# Patient Record
Sex: MALE | Race: WHITE | Employment: OTHER | URBAN - NONMETROPOLITAN AREA
[De-identification: names, ages, dates, MRNs, and addresses within clinical notes are randomized per-mention and may not be internally consistent; named-entity substitution may affect disease eponyms.]

---

## 2019-01-08 ENCOUNTER — HOSPITAL ENCOUNTER (OUTPATIENT)
Dept: GENERAL RADIOLOGY | Age: 64
Discharge: HOME | End: 2019-01-08
Payer: MEDICAID

## 2019-01-08 ENCOUNTER — HOSPITAL ENCOUNTER (OUTPATIENT)
Dept: MRI IMAGING | Age: 64
Discharge: HOME | End: 2019-01-08
Payer: MEDICAID

## 2019-01-08 DIAGNOSIS — M54.6 PAIN IN THORACIC SPINE: ICD-10-CM

## 2019-01-08 DIAGNOSIS — M50.321 DEGENERATION OF INTERVERTEBRAL DISC AT C4-C5 LEVEL: ICD-10-CM

## 2019-01-08 DIAGNOSIS — M48.02 CERVICAL SPINAL STENOSIS: ICD-10-CM

## 2019-01-08 DIAGNOSIS — M50.30 DEGENERATION OF CERVICAL DISC WITHOUT MYELOPATHY: ICD-10-CM

## 2019-01-08 DIAGNOSIS — M47.812 ARTHROPATHY OF CERVICAL FACET JOINT: ICD-10-CM

## 2019-01-08 PROCEDURE — 72141 MRI NECK SPINE W/O DYE: CPT

## 2019-01-08 PROCEDURE — 72040 X-RAY EXAM NECK SPINE 2-3 VW: CPT

## 2019-01-08 PROCEDURE — 72072 X-RAY EXAM THORAC SPINE 3VWS: CPT

## 2020-06-16 ENCOUNTER — TRANSCRIBE ORDERS (OUTPATIENT)
Dept: ADMINISTRATIVE | Facility: HOSPITAL | Age: 65
End: 2020-06-16

## 2020-06-16 DIAGNOSIS — Z01.818 PREOP TESTING: Primary | ICD-10-CM

## 2020-06-20 ENCOUNTER — LAB (OUTPATIENT)
Dept: LAB | Facility: HOSPITAL | Age: 65
End: 2020-06-20

## 2020-06-20 PROCEDURE — U0003 INFECTIOUS AGENT DETECTION BY NUCLEIC ACID (DNA OR RNA); SEVERE ACUTE RESPIRATORY SYNDROME CORONAVIRUS 2 (SARS-COV-2) (CORONAVIRUS DISEASE [COVID-19]), AMPLIFIED PROBE TECHNIQUE, MAKING USE OF HIGH THROUGHPUT TECHNOLOGIES AS DESCRIBED BY CMS-2020-01-R: HCPCS | Performed by: PAIN MEDICINE

## 2020-06-21 LAB
COVID LABCORP PRIORITY: NORMAL
SARS-COV-2 RNA RESP QL NAA+PROBE: NOT DETECTED

## 2020-09-28 ENCOUNTER — TRANSCRIBE ORDERS (OUTPATIENT)
Dept: ADMINISTRATIVE | Facility: HOSPITAL | Age: 65
End: 2020-09-28

## 2020-09-28 DIAGNOSIS — Z01.818 PREOP TESTING: Primary | ICD-10-CM

## 2020-10-05 ENCOUNTER — LAB (OUTPATIENT)
Dept: LAB | Facility: HOSPITAL | Age: 65
End: 2020-10-05

## 2020-10-05 ENCOUNTER — HOSPITAL ENCOUNTER (OUTPATIENT)
Dept: GENERAL RADIOLOGY | Age: 65
Discharge: HOME | End: 2020-10-05
Payer: MEDICAID

## 2020-10-05 PROCEDURE — C9803 HOPD COVID-19 SPEC COLLECT: HCPCS | Performed by: PAIN MEDICINE

## 2020-10-05 PROCEDURE — U0003 INFECTIOUS AGENT DETECTION BY NUCLEIC ACID (DNA OR RNA); SEVERE ACUTE RESPIRATORY SYNDROME CORONAVIRUS 2 (SARS-COV-2) (CORONAVIRUS DISEASE [COVID-19]), AMPLIFIED PROBE TECHNIQUE, MAKING USE OF HIGH THROUGHPUT TECHNOLOGIES AS DESCRIBED BY CMS-2020-01-R: HCPCS | Performed by: PAIN MEDICINE

## 2020-10-05 PROCEDURE — 72100 X-RAY EXAM L-S SPINE 2/3 VWS: CPT

## 2020-10-06 LAB
COVID LABCORP PRIORITY: NORMAL
SARS-COV-2 RNA RESP QL NAA+PROBE: NOT DETECTED

## 2021-01-06 ENCOUNTER — TRANSCRIBE ORDERS (OUTPATIENT)
Dept: LAB | Facility: HOSPITAL | Age: 66
End: 2021-01-06

## 2021-01-06 DIAGNOSIS — Z01.818 PREOP TESTING: Primary | ICD-10-CM

## 2021-01-07 ENCOUNTER — LAB (OUTPATIENT)
Dept: LAB | Facility: HOSPITAL | Age: 66
End: 2021-01-07

## 2021-01-07 PROCEDURE — C9803 HOPD COVID-19 SPEC COLLECT: HCPCS | Performed by: PAIN MEDICINE

## 2021-01-07 PROCEDURE — U0004 COV-19 TEST NON-CDC HGH THRU: HCPCS | Performed by: PAIN MEDICINE

## 2021-01-08 LAB — SARS-COV-2 ORF1AB RESP QL NAA+PROBE: NOT DETECTED

## 2021-02-03 ENCOUNTER — TRANSCRIBE ORDERS (OUTPATIENT)
Dept: LAB | Facility: HOSPITAL | Age: 66
End: 2021-02-03

## 2021-02-03 DIAGNOSIS — Z11.52 ENCOUNTER FOR SCREENING FOR COVID-19: Primary | ICD-10-CM

## 2021-02-04 ENCOUNTER — LAB (OUTPATIENT)
Dept: LAB | Facility: HOSPITAL | Age: 66
End: 2021-02-04

## 2021-02-04 LAB — SARS-COV-2 ORF1AB RESP QL NAA+PROBE: NOT DETECTED

## 2021-02-04 PROCEDURE — U0004 COV-19 TEST NON-CDC HGH THRU: HCPCS | Performed by: NURSE PRACTITIONER

## 2021-02-04 PROCEDURE — C9803 HOPD COVID-19 SPEC COLLECT: HCPCS | Performed by: NURSE PRACTITIONER

## 2021-04-09 ENCOUNTER — TRANSCRIBE ORDERS (OUTPATIENT)
Dept: ADMINISTRATIVE | Facility: HOSPITAL | Age: 66
End: 2021-04-09

## 2021-04-09 DIAGNOSIS — Z01.818 PREOP TESTING: Primary | ICD-10-CM

## 2021-04-12 ENCOUNTER — LAB (OUTPATIENT)
Dept: LAB | Facility: HOSPITAL | Age: 66
End: 2021-04-12

## 2021-04-12 LAB — SARS-COV-2 ORF1AB RESP QL NAA+PROBE: NOT DETECTED

## 2021-04-12 PROCEDURE — U0004 COV-19 TEST NON-CDC HGH THRU: HCPCS | Performed by: PAIN MEDICINE

## 2021-04-12 PROCEDURE — C9803 HOPD COVID-19 SPEC COLLECT: HCPCS | Performed by: PAIN MEDICINE

## 2021-04-14 ENCOUNTER — TRANSCRIBE ORDERS (OUTPATIENT)
Dept: ADMINISTRATIVE | Facility: HOSPITAL | Age: 66
End: 2021-04-14

## 2021-04-14 DIAGNOSIS — M47.12 CERVICAL SPONDYLOSIS WITH MYELOPATHY: Primary | ICD-10-CM

## 2021-04-23 ENCOUNTER — HOSPITAL ENCOUNTER (OUTPATIENT)
Dept: MRI IMAGING | Facility: HOSPITAL | Age: 66
Discharge: HOME | End: 2021-04-23
Admitting: PHYSICAL MEDICINE & REHABILITATION

## 2021-04-23 DIAGNOSIS — M47.12 CERVICAL SPONDYLOSIS WITH MYELOPATHY: ICD-10-CM

## 2021-04-23 PROCEDURE — 72141 MRI NECK SPINE W/O DYE: CPT

## 2021-05-12 ENCOUNTER — TRANSCRIBE ORDERS (OUTPATIENT)
Dept: LAB | Facility: HOSPITAL | Age: 66
End: 2021-05-12

## 2021-05-12 DIAGNOSIS — Z01.818 PRE-OP TESTING: Primary | ICD-10-CM

## 2021-05-14 ENCOUNTER — LAB (OUTPATIENT)
Dept: LAB | Facility: HOSPITAL | Age: 66
End: 2021-05-14

## 2021-05-14 LAB — SARS-COV-2 ORF1AB RESP QL NAA+PROBE: NOT DETECTED

## 2021-05-14 PROCEDURE — U0004 COV-19 TEST NON-CDC HGH THRU: HCPCS | Performed by: PAIN MEDICINE

## 2021-05-14 PROCEDURE — C9803 HOPD COVID-19 SPEC COLLECT: HCPCS | Performed by: PAIN MEDICINE

## 2021-07-19 ENCOUNTER — LAB (OUTPATIENT)
Dept: LAB | Facility: HOSPITAL | Age: 66
End: 2021-07-19

## 2021-07-19 ENCOUNTER — TRANSCRIBE ORDERS (OUTPATIENT)
Dept: LAB | Facility: HOSPITAL | Age: 66
End: 2021-07-19

## 2021-07-19 DIAGNOSIS — Z01.818 PREOPERATIVE TESTING: Primary | ICD-10-CM

## 2021-07-19 LAB — SARS-COV-2 ORF1AB RESP QL NAA+PROBE: NOT DETECTED

## 2021-07-19 PROCEDURE — C9803 HOPD COVID-19 SPEC COLLECT: HCPCS | Performed by: PAIN MEDICINE

## 2021-07-19 PROCEDURE — U0004 COV-19 TEST NON-CDC HGH THRU: HCPCS | Performed by: PAIN MEDICINE

## 2021-08-18 ENCOUNTER — TRANSCRIBE ORDERS (OUTPATIENT)
Dept: LAB | Facility: HOSPITAL | Age: 66
End: 2021-08-18

## 2021-08-18 DIAGNOSIS — Z01.818 PREOPERATIVE TESTING: Primary | ICD-10-CM

## 2021-08-20 ENCOUNTER — LAB (OUTPATIENT)
Dept: LAB | Facility: HOSPITAL | Age: 66
End: 2021-08-20

## 2021-08-20 LAB — SARS-COV-2 ORF1AB RESP QL NAA+PROBE: NOT DETECTED

## 2021-08-20 PROCEDURE — U0004 COV-19 TEST NON-CDC HGH THRU: HCPCS | Performed by: PAIN MEDICINE

## 2021-08-20 PROCEDURE — C9803 HOPD COVID-19 SPEC COLLECT: HCPCS | Performed by: PAIN MEDICINE

## 2021-10-22 ENCOUNTER — TRANSCRIBE ORDERS (OUTPATIENT)
Dept: LAB | Facility: HOSPITAL | Age: 66
End: 2021-10-22

## 2021-10-22 DIAGNOSIS — Z01.818 PREOP TESTING: Primary | ICD-10-CM

## 2021-10-23 ENCOUNTER — LAB (OUTPATIENT)
Dept: LAB | Facility: HOSPITAL | Age: 66
End: 2021-10-23

## 2021-10-23 LAB — SARS-COV-2 ORF1AB RESP QL NAA+PROBE: NOT DETECTED

## 2021-10-23 PROCEDURE — U0004 COV-19 TEST NON-CDC HGH THRU: HCPCS | Performed by: PAIN MEDICINE

## 2021-10-23 PROCEDURE — C9803 HOPD COVID-19 SPEC COLLECT: HCPCS | Performed by: PAIN MEDICINE

## 2021-11-09 ENCOUNTER — HOSPITAL ENCOUNTER (OUTPATIENT)
Dept: GENERAL RADIOLOGY | Age: 66
Discharge: HOME | End: 2021-11-09
Payer: MEDICAID

## 2021-11-09 ENCOUNTER — HOSPITAL ENCOUNTER (OUTPATIENT)
Dept: MRI IMAGING | Age: 66
Discharge: HOME | End: 2021-11-09
Payer: MEDICAID

## 2021-11-09 ENCOUNTER — APPOINTMENT (OUTPATIENT)
Dept: CT IMAGING | Age: 66
End: 2021-11-09
Payer: MEDICAID

## 2021-11-09 ENCOUNTER — HOSPITAL ENCOUNTER (EMERGENCY)
Age: 66
Discharge: ANOTHER HOSPITAL | End: 2021-11-09
Attending: EMERGENCY MEDICINE
Payer: MEDICAID

## 2021-11-09 ENCOUNTER — HOSPITAL ENCOUNTER (INPATIENT)
Facility: HOSPITAL | Age: 66
LOS: 2 days | Discharge: HOME | End: 2021-11-11
Attending: SURGERY | Admitting: SURGERY

## 2021-11-09 ENCOUNTER — OFFICE VISIT (OUTPATIENT)
Dept: VASCULAR SURGERY | Facility: CLINIC | Age: 66
End: 2021-11-09

## 2021-11-09 VITALS
HEART RATE: 69 BPM | BODY MASS INDEX: 37.44 KG/M2 | OXYGEN SATURATION: 92 % | WEIGHT: 247 LBS | DIASTOLIC BLOOD PRESSURE: 78 MMHG | HEIGHT: 68 IN | SYSTOLIC BLOOD PRESSURE: 120 MMHG

## 2021-11-09 VITALS
OXYGEN SATURATION: 97 % | WEIGHT: 247 LBS | HEART RATE: 74 BPM | HEIGHT: 78 IN | DIASTOLIC BLOOD PRESSURE: 90 MMHG | RESPIRATION RATE: 20 BRPM | TEMPERATURE: 98.6 F | SYSTOLIC BLOOD PRESSURE: 124 MMHG | BODY MASS INDEX: 28.58 KG/M2

## 2021-11-09 DIAGNOSIS — M54.50 CHRONIC LOW BACK PAIN WITHOUT SCIATICA, UNSPECIFIED BACK PAIN LATERALITY: ICD-10-CM

## 2021-11-09 DIAGNOSIS — Z72.0 TOBACCO USE: ICD-10-CM

## 2021-11-09 DIAGNOSIS — I71.40 ABDOMINAL AORTIC ANEURYSM (AAA) GREATER THAN 5.5 CM IN DIAMETER IN MALE (HCC): Primary | ICD-10-CM

## 2021-11-09 DIAGNOSIS — Z72.0 TOBACCO ABUSE: ICD-10-CM

## 2021-11-09 DIAGNOSIS — E78.5 HYPERLIPIDEMIA, UNSPECIFIED HYPERLIPIDEMIA TYPE: ICD-10-CM

## 2021-11-09 DIAGNOSIS — I10 PRIMARY HYPERTENSION: ICD-10-CM

## 2021-11-09 DIAGNOSIS — I71.40 ABDOMINAL AORTIC ANEURYSM (AAA) WITHOUT RUPTURE: Primary | ICD-10-CM

## 2021-11-09 DIAGNOSIS — M51.36 OTHER INTERVERTEBRAL DISC DEGENERATION, LUMBAR REGION: ICD-10-CM

## 2021-11-09 DIAGNOSIS — I71.40 ABDOMINAL AORTIC ANEURYSM (AAA) WITHOUT RUPTURE (HCC): Primary | ICD-10-CM

## 2021-11-09 DIAGNOSIS — I10 ESSENTIAL HYPERTENSION: ICD-10-CM

## 2021-11-09 DIAGNOSIS — G89.29 CHRONIC LOW BACK PAIN WITHOUT SCIATICA, UNSPECIFIED BACK PAIN LATERALITY: ICD-10-CM

## 2021-11-09 DIAGNOSIS — K50.90 CROHN'S DISEASE WITHOUT COMPLICATION, UNSPECIFIED GASTROINTESTINAL TRACT LOCATION (HCC): ICD-10-CM

## 2021-11-09 LAB
ABO GROUP BLD: NORMAL
ABO/RH: NORMAL
ALBUMIN SERPL-MCNC: 4 G/DL (ref 3.5–5.2)
ALP BLD-CCNC: 82 U/L (ref 40–130)
ALT SERPL-CCNC: 19 U/L (ref 5–41)
ANION GAP SERPL CALCULATED.3IONS-SCNC: 12 MMOL/L (ref 7–19)
ANTIBODY SCREEN: NORMAL
APTT PPP: 27.7 SECONDS (ref 24.1–35)
AST SERPL-CCNC: 14 U/L (ref 5–40)
BASOPHILS ABSOLUTE: 0.1 K/UL (ref 0–0.2)
BASOPHILS RELATIVE PERCENT: 0.9 % (ref 0–1)
BILIRUB SERPL-MCNC: 0.4 MG/DL (ref 0.2–1.2)
BLD GP AB SCN SERPL QL: NEGATIVE
BUN BLDV-MCNC: 16 MG/DL (ref 8–23)
CALCIUM SERPL-MCNC: 9.4 MG/DL (ref 8.8–10.2)
CHLORIDE BLD-SCNC: 106 MMOL/L (ref 98–111)
CO2: 22 MMOL/L (ref 22–29)
CREAT SERPL-MCNC: 0.8 MG/DL (ref 0.5–1.2)
EOSINOPHILS ABSOLUTE: 0.1 K/UL (ref 0–0.6)
EOSINOPHILS RELATIVE PERCENT: 1.4 % (ref 0–5)
GFR AFRICAN AMERICAN: >59
GFR NON-AFRICAN AMERICAN: >60
GLUCOSE BLD-MCNC: 133 MG/DL (ref 74–109)
HCT VFR BLD CALC: 45.5 % (ref 42–52)
HEMOGLOBIN: 14.7 G/DL (ref 14–18)
IMMATURE GRANULOCYTES #: 0.1 K/UL
INR BLD: 1 (ref 0.88–1.18)
LIPASE: 19 U/L (ref 13–60)
LYMPHOCYTES ABSOLUTE: 2.4 K/UL (ref 1.1–4.5)
LYMPHOCYTES RELATIVE PERCENT: 26.7 % (ref 20–40)
MCH RBC QN AUTO: 32 PG (ref 27–31)
MCHC RBC AUTO-ENTMCNC: 32.3 G/DL (ref 33–37)
MCV RBC AUTO: 99.1 FL (ref 80–94)
MONOCYTES ABSOLUTE: 1.1 K/UL (ref 0–0.9)
MONOCYTES RELATIVE PERCENT: 12.4 % (ref 0–10)
NEUTROPHILS ABSOLUTE: 5.3 K/UL (ref 1.5–7.5)
NEUTROPHILS RELATIVE PERCENT: 58 % (ref 50–65)
PDW BLD-RTO: 13.2 % (ref 11.5–14.5)
PLATELET # BLD: 211 K/UL (ref 130–400)
PMV BLD AUTO: 9.5 FL (ref 9.4–12.4)
POTASSIUM SERPL-SCNC: 4.1 MMOL/L (ref 3.5–5)
PROTHROMBIN TIME: 13.4 SEC (ref 12–14.6)
RBC # BLD: 4.59 M/UL (ref 4.7–6.1)
RH BLD: POSITIVE
SARS-COV-2 RNA PNL SPEC NAA+PROBE: NOT DETECTED
SARS-COV-2, NAAT: NOT DETECTED
SODIUM BLD-SCNC: 140 MMOL/L (ref 136–145)
T&S EXPIRATION DATE: NORMAL
TOTAL PROTEIN: 7 G/DL (ref 6.6–8.7)
WBC # BLD: 9.1 K/UL (ref 4.8–10.8)

## 2021-11-09 PROCEDURE — 86850 RBC ANTIBODY SCREEN: CPT

## 2021-11-09 PROCEDURE — 83690 ASSAY OF LIPASE: CPT

## 2021-11-09 PROCEDURE — 96374 THER/PROPH/DIAG INJ IV PUSH: CPT

## 2021-11-09 PROCEDURE — 87635 SARS-COV-2 COVID-19 AMP PRB: CPT | Performed by: SURGERY

## 2021-11-09 PROCEDURE — 87635 SARS-COV-2 COVID-19 AMP PRB: CPT

## 2021-11-09 PROCEDURE — 85730 THROMBOPLASTIN TIME PARTIAL: CPT | Performed by: SURGERY

## 2021-11-09 PROCEDURE — 85025 COMPLETE CBC W/AUTO DIFF WBC: CPT

## 2021-11-09 PROCEDURE — 6360000002 HC RX W HCPCS: Performed by: EMERGENCY MEDICINE

## 2021-11-09 PROCEDURE — 86901 BLOOD TYPING SEROLOGIC RH(D): CPT | Performed by: NURSE PRACTITIONER

## 2021-11-09 PROCEDURE — 74174 CTA ABD&PLVS W/CONTRAST: CPT

## 2021-11-09 PROCEDURE — 99284 EMERGENCY DEPT VISIT MOD MDM: CPT

## 2021-11-09 PROCEDURE — 86850 RBC ANTIBODY SCREEN: CPT | Performed by: NURSE PRACTITIONER

## 2021-11-09 PROCEDURE — 72100 X-RAY EXAM L-S SPINE 2/3 VWS: CPT

## 2021-11-09 PROCEDURE — 99204 OFFICE O/P NEW MOD 45 MIN: CPT | Performed by: SURGERY

## 2021-11-09 PROCEDURE — 85610 PROTHROMBIN TIME: CPT

## 2021-11-09 PROCEDURE — 93005 ELECTROCARDIOGRAM TRACING: CPT | Performed by: EMERGENCY MEDICINE

## 2021-11-09 PROCEDURE — 2500000003 HC RX 250 WO HCPCS: Performed by: EMERGENCY MEDICINE

## 2021-11-09 PROCEDURE — 6360000004 HC RX CONTRAST MEDICATION: Performed by: EMERGENCY MEDICINE

## 2021-11-09 PROCEDURE — 96375 TX/PRO/DX INJ NEW DRUG ADDON: CPT

## 2021-11-09 PROCEDURE — 86901 BLOOD TYPING SEROLOGIC RH(D): CPT

## 2021-11-09 PROCEDURE — 99285 EMERGENCY DEPT VISIT HI MDM: CPT

## 2021-11-09 PROCEDURE — 80053 COMPREHEN METABOLIC PANEL: CPT

## 2021-11-09 PROCEDURE — 72148 MRI LUMBAR SPINE W/O DYE: CPT

## 2021-11-09 PROCEDURE — 86900 BLOOD TYPING SEROLOGIC ABO: CPT | Performed by: NURSE PRACTITIONER

## 2021-11-09 PROCEDURE — 71275 CT ANGIOGRAPHY CHEST: CPT

## 2021-11-09 PROCEDURE — 86900 BLOOD TYPING SEROLOGIC ABO: CPT

## 2021-11-09 PROCEDURE — 36415 COLL VENOUS BLD VENIPUNCTURE: CPT

## 2021-11-09 RX ORDER — ATORVASTATIN CALCIUM 20 MG/1
20 TABLET, FILM COATED ORAL NIGHTLY
COMMUNITY

## 2021-11-09 RX ORDER — ONDANSETRON 2 MG/ML
4 INJECTION INTRAMUSCULAR; INTRAVENOUS ONCE
Status: COMPLETED | OUTPATIENT
Start: 2021-11-09 | End: 2021-11-09

## 2021-11-09 RX ORDER — ATORVASTATIN CALCIUM 20 MG/1
TABLET, FILM COATED ORAL
COMMUNITY

## 2021-11-09 RX ORDER — TRAZODONE HYDROCHLORIDE 50 MG/1
50 TABLET ORAL NIGHTLY
Status: DISCONTINUED | OUTPATIENT
Start: 2021-11-09 | End: 2021-11-10 | Stop reason: HOSPADM

## 2021-11-09 RX ORDER — MESALAMINE 0.38 G/1
0.38 CAPSULE, EXTENDED RELEASE ORAL EVERY MORNING
Status: DISCONTINUED | OUTPATIENT
Start: 2021-11-10 | End: 2021-11-10 | Stop reason: HOSPADM

## 2021-11-09 RX ORDER — LABETALOL HYDROCHLORIDE 5 MG/ML
10 INJECTION, SOLUTION INTRAVENOUS ONCE
Status: COMPLETED | OUTPATIENT
Start: 2021-11-09 | End: 2021-11-09

## 2021-11-09 RX ORDER — ACETAMINOPHEN 325 MG/1
650 TABLET ORAL EVERY 4 HOURS PRN
Status: DISCONTINUED | OUTPATIENT
Start: 2021-11-09 | End: 2021-11-10 | Stop reason: HOSPADM

## 2021-11-09 RX ORDER — ONDANSETRON 4 MG/1
4 TABLET, FILM COATED ORAL EVERY 6 HOURS PRN
Status: DISCONTINUED | OUTPATIENT
Start: 2021-11-09 | End: 2021-11-10 | Stop reason: HOSPADM

## 2021-11-09 RX ORDER — LISINOPRIL 20 MG/1
40 TABLET ORAL NIGHTLY
Status: DISCONTINUED | OUTPATIENT
Start: 2021-11-09 | End: 2021-11-10 | Stop reason: HOSPADM

## 2021-11-09 RX ORDER — ALLOPURINOL 300 MG/1
300 TABLET ORAL NIGHTLY
COMMUNITY

## 2021-11-09 RX ORDER — FAMOTIDINE 40 MG/1
80 TABLET, FILM COATED ORAL NIGHTLY
COMMUNITY
End: 2021-12-09

## 2021-11-09 RX ORDER — METOPROLOL SUCCINATE 100 MG/1
1 TABLET, EXTENDED RELEASE ORAL DAILY
COMMUNITY
Start: 2021-11-02

## 2021-11-09 RX ORDER — METOPROLOL SUCCINATE 100 MG/1
100 TABLET, EXTENDED RELEASE ORAL NIGHTLY
Status: DISCONTINUED | OUTPATIENT
Start: 2021-11-09 | End: 2021-11-10 | Stop reason: HOSPADM

## 2021-11-09 RX ORDER — ALLOPURINOL 300 MG/1
TABLET ORAL
COMMUNITY

## 2021-11-09 RX ORDER — FAMOTIDINE 20 MG/1
80 TABLET, FILM COATED ORAL NIGHTLY
Status: DISCONTINUED | OUTPATIENT
Start: 2021-11-09 | End: 2021-11-10 | Stop reason: HOSPADM

## 2021-11-09 RX ORDER — ALLOPURINOL 300 MG/1
300 TABLET ORAL NIGHTLY
Status: DISCONTINUED | OUTPATIENT
Start: 2021-11-09 | End: 2021-11-10 | Stop reason: HOSPADM

## 2021-11-09 RX ORDER — ATORVASTATIN CALCIUM 10 MG/1
20 TABLET, FILM COATED ORAL NIGHTLY
Status: DISCONTINUED | OUTPATIENT
Start: 2021-11-09 | End: 2021-11-10 | Stop reason: HOSPADM

## 2021-11-09 RX ORDER — LISINOPRIL 40 MG/1
TABLET ORAL
COMMUNITY

## 2021-11-09 RX ORDER — PANTOPRAZOLE SODIUM 40 MG/1
40 TABLET, DELAYED RELEASE ORAL EVERY MORNING
Status: DISCONTINUED | OUTPATIENT
Start: 2021-11-10 | End: 2021-11-10 | Stop reason: HOSPADM

## 2021-11-09 RX ORDER — MESALAMINE 0.38 G/1
1.5 CAPSULE, EXTENDED RELEASE ORAL DAILY
COMMUNITY

## 2021-11-09 RX ORDER — ONDANSETRON 2 MG/ML
4 INJECTION INTRAMUSCULAR; INTRAVENOUS EVERY 6 HOURS PRN
Status: DISCONTINUED | OUTPATIENT
Start: 2021-11-09 | End: 2021-11-10 | Stop reason: HOSPADM

## 2021-11-09 RX ORDER — OMEPRAZOLE 40 MG/1
CAPSULE, DELAYED RELEASE ORAL
COMMUNITY

## 2021-11-09 RX ORDER — HYDROCODONE BITARTRATE AND ACETAMINOPHEN 7.5; 325 MG/1; MG/1
1 TABLET ORAL 3 TIMES DAILY PRN
COMMUNITY
Start: 2021-10-15 | End: 2021-12-09

## 2021-11-09 RX ORDER — TRAZODONE HYDROCHLORIDE 50 MG/1
TABLET ORAL
COMMUNITY

## 2021-11-09 RX ORDER — FENTANYL CITRATE 50 UG/ML
50 INJECTION, SOLUTION INTRAMUSCULAR; INTRAVENOUS ONCE
Status: COMPLETED | OUTPATIENT
Start: 2021-11-09 | End: 2021-11-09

## 2021-11-09 RX ORDER — MESALAMINE 0.38 G/1
1.5 CAPSULE, EXTENDED RELEASE ORAL DAILY
Status: ON HOLD | COMMUNITY
End: 2021-11-09

## 2021-11-09 RX ORDER — TRAZODONE HYDROCHLORIDE 50 MG/1
50 TABLET ORAL NIGHTLY
COMMUNITY

## 2021-11-09 RX ORDER — MESALAMINE 0.38 G/1
1.5 CAPSULE, EXTENDED RELEASE ORAL EVERY MORNING
COMMUNITY
End: 2023-02-02

## 2021-11-09 RX ORDER — BUDESONIDE AND FORMOTEROL FUMARATE DIHYDRATE 160; 4.5 UG/1; UG/1
2 AEROSOL RESPIRATORY (INHALATION)
COMMUNITY
Start: 2021-11-02

## 2021-11-09 RX ORDER — METOPROLOL SUCCINATE 100 MG/1
TABLET, EXTENDED RELEASE ORAL
COMMUNITY
Start: 2021-11-02

## 2021-11-09 RX ORDER — OMEPRAZOLE 40 MG/1
40 CAPSULE, DELAYED RELEASE ORAL EVERY MORNING
COMMUNITY

## 2021-11-09 RX ORDER — NICOTINE 21 MG/24HR
1 PATCH, TRANSDERMAL 24 HOURS TRANSDERMAL
Status: DISCONTINUED | OUTPATIENT
Start: 2021-11-09 | End: 2021-11-10 | Stop reason: HOSPADM

## 2021-11-09 RX ORDER — HYDROCODONE BITARTRATE AND ACETAMINOPHEN 7.5; 325 MG/1; MG/1
TABLET ORAL
COMMUNITY
Start: 2021-10-15

## 2021-11-09 RX ORDER — LISINOPRIL 40 MG/1
40 TABLET ORAL DAILY
COMMUNITY

## 2021-11-09 RX ORDER — PANTOPRAZOLE SODIUM 40 MG/1
40 TABLET, DELAYED RELEASE ORAL DAILY
Status: ON HOLD | COMMUNITY
Start: 2021-08-31 | End: 2021-11-09

## 2021-11-09 RX ORDER — SODIUM CHLORIDE 0.9 % (FLUSH) 0.9 %
10 SYRINGE (ML) INJECTION AS NEEDED
Status: DISCONTINUED | OUTPATIENT
Start: 2021-11-09 | End: 2021-11-10 | Stop reason: HOSPADM

## 2021-11-09 RX ORDER — SODIUM CHLORIDE 0.9 % (FLUSH) 0.9 %
10 SYRINGE (ML) INJECTION EVERY 12 HOURS SCHEDULED
Status: DISCONTINUED | OUTPATIENT
Start: 2021-11-09 | End: 2021-11-10 | Stop reason: HOSPADM

## 2021-11-09 RX ORDER — HYDROCODONE BITARTRATE AND ACETAMINOPHEN 7.5; 325 MG/1; MG/1
1 TABLET ORAL EVERY 6 HOURS PRN
Status: DISCONTINUED | OUTPATIENT
Start: 2021-11-09 | End: 2021-11-10 | Stop reason: HOSPADM

## 2021-11-09 RX ORDER — SODIUM CHLORIDE 9 MG/ML
50 INJECTION, SOLUTION INTRAVENOUS CONTINUOUS
Status: DISCONTINUED | OUTPATIENT
Start: 2021-11-09 | End: 2021-11-10 | Stop reason: HOSPADM

## 2021-11-09 RX ADMIN — LISINOPRIL 40 MG: 20 TABLET ORAL at 20:35

## 2021-11-09 RX ADMIN — Medication 10 MG: at 10:00

## 2021-11-09 RX ADMIN — FENTANYL CITRATE 50 MCG: 0.05 INJECTION, SOLUTION INTRAMUSCULAR; INTRAVENOUS at 11:03

## 2021-11-09 RX ADMIN — ATORVASTATIN CALCIUM 20 MG: 10 TABLET, FILM COATED ORAL at 20:35

## 2021-11-09 RX ADMIN — SODIUM CHLORIDE 50 ML/HR: 9 INJECTION, SOLUTION INTRAVENOUS at 15:00

## 2021-11-09 RX ADMIN — FAMOTIDINE 80 MG: 20 TABLET, FILM COATED ORAL at 20:35

## 2021-11-09 RX ADMIN — ALLOPURINOL 300 MG: 300 TABLET ORAL at 20:35

## 2021-11-09 RX ADMIN — ONDANSETRON 4 MG: 2 INJECTION INTRAMUSCULAR; INTRAVENOUS at 11:03

## 2021-11-09 RX ADMIN — SODIUM CHLORIDE, PRESERVATIVE FREE 10 ML: 5 INJECTION INTRAVENOUS at 20:22

## 2021-11-09 RX ADMIN — SODIUM CHLORIDE, PRESERVATIVE FREE 10 ML: 5 INJECTION INTRAVENOUS at 15:00

## 2021-11-09 RX ADMIN — HYDROCODONE BITARTRATE AND ACETAMINOPHEN 1 TABLET: 7.5; 325 TABLET ORAL at 18:38

## 2021-11-09 RX ADMIN — METOPROLOL SUCCINATE 100 MG: 100 TABLET, EXTENDED RELEASE ORAL at 20:35

## 2021-11-09 RX ADMIN — NICOTINE 1 PATCH: 21 PATCH, EXTENDED RELEASE TRANSDERMAL at 15:00

## 2021-11-09 RX ADMIN — IOPAMIDOL 90 ML: 755 INJECTION, SOLUTION INTRAVENOUS at 10:03

## 2021-11-09 RX ADMIN — TRAZODONE HYDROCHLORIDE 50 MG: 50 TABLET ORAL at 20:35

## 2021-11-09 ASSESSMENT — ENCOUNTER SYMPTOMS
BACK PAIN: 1
SHORTNESS OF BREATH: 0
BLOOD IN STOOL: 1
COUGH: 0
ABDOMINAL PAIN: 1

## 2021-11-09 ASSESSMENT — PAIN SCALES - GENERAL: PAINLEVEL_OUTOF10: 5

## 2021-11-09 NOTE — ED NOTES
Naya called back @ [phone]     Vascular Surg.  Will call back      Jason Shay  11/09/21 Zenon Quintero  11/09/21 5562

## 2021-11-09 NOTE — ED PROVIDER NOTES
[facility]  eMERGENCY dEPARTMENT eNCOUnter      Pt Name: Antonio Martinez  MRN: [id number]  [place] 1955  Date of evaluation: 11/9/2021  Provider: Jessica Bella MD    CHIEF COMPLAINT       Chief Complaint   Patient presents with    Back Pain     Sent here from MRI, was getting MRI of back. Possible AAA on MRI, sent here for further evaluation          HISTORY OF PRESENT ILLNESS   (Location/Symptom, Timing/Onset,Context/Setting, Quality, Duration, Modifying Factors, Severity)  Note limiting factors. Antonio Martinez is a 61 y.o. male who presents to the emergency department with back pain. The patient has chronic back pain worsening over the last weeks to months. He also has Crohn's disease he states he has diarrhea at times. And bloody stools now. The patient was sent for an outpatient MRI by Dr. Charleen Lima of pain management as he is had chronic back issues. He was sent over from the MRI machine to the ER after they found a large AAA. The patient arrived he was awake alert he stated that he really could not tell me if his pain was any worse or better today. He has a history of hypertension he does smoke cigarettes. Patient has no fevers or cough. He said no syncope. He has no weakness in his lower extremities. The patient only has pain in his belly when I push on it. Otherwise he states he has been at his usual baseline. The history is provided by the patient, medical records and a relative. NursingNotes were reviewed. REVIEW OF SYSTEMS    (2-9 systems for level 4, 10 or more for level 5)     Review of Systems   Constitutional: Negative for fever. Respiratory: Negative for cough and shortness of breath. Cardiovascular: Negative for chest pain. Gastrointestinal: Positive for abdominal pain and blood in stool. Musculoskeletal: Positive for back pain. Skin: Negative for wound. Neurological: Negative for seizures and syncope.    Psychiatric/Behavioral: Negative for confusion. A complete review of systems was performed and is negative except as noted above in the HPI. PAST MEDICAL HISTORY     Past Medical History:   Diagnosis Date    Arthritis     Chest pain     COPD (chronic obstructive pulmonary disease) ([address]     Dizziness     Gout     Hyperlipidemia     Hypertension          SURGICAL HISTORY       Past Surgical History:   Procedure Laterality Date    APPENDECTOMY      DOPPLER ECHOCARDIOGRAPHY  12/18/10, [facility]    2-D Echo   [facility]    TESTICLE REMOVAL      WRIST SURGERY           CURRENT MEDICATIONS       Discharge Medication List as of 11/9/2021 11:30 AM      CONTINUE these medications which have NOT CHANGED    Details   mesalamine (APRISO) 0.375 g extended release capsule Take 1.5 g by mouth dailyHistorical Med      omeprazole (PRILOSEC) 40 MG delayed release capsule omeprazole 40 mg cpdrHistorical Med      allopurinol (ZYLOPRIM) 300 MG tablet allopurinol 300 mg tabsHistorical Med      atorvastatin (LIPITOR) 20 MG tablet atorvastatin calcium 20 mg tabsHistorical Med      lisinopril (PRINIVIL;ZESTRIL) 40 MG tablet lisinopril 40 mg tabsHistorical Med      metoprolol succinate (TOPROL XL) 100 MG extended release tablet TAKE 1 TABLET BY MOUTH ONCE DAILYHistorical Med      traZODone (DESYREL) 50 MG tablet trazodone 50 mg tablet   TAKE (1) TABLET BY MOUTH EVERY DAY AT BEDTIMEHistorical Med      ipratropium (ATROVENT HFA) 17 MCG/ACT inhaler Inhale 2 puffs into the lungs every 6 hours. aspirin 325 MG EC tablet Take 325 mg by mouth 2 times daily. Historical Med      HYDROcodone-acetaminophen (NORCO) 7.5-325 MG per tablet TAKE 1 TABLET BY MOUTH THREE TIMES DAILY AS NEEDED FOR PAINHistorical Med             ALLERGIES     Midrin [apap-isometheptene-dichloral] and Phenylephrine    FAMILY HISTORY       Family History   Problem Relation Age of Onset    Heart Disease Father     High Blood Pressure Father     High Cholesterol Father    Maura Hunter High Blood Pressure Sister     High Cholesterol Sister           SOCIAL HISTORY       Social History     Socioeconomic History    Marital status:      Spouse name: None    Number of children: None    Years of education: None    Highest education level: None   Occupational History    None   Tobacco Use    Smoking status: Current Every Day Smoker     Packs/day: 1.00    Smokeless tobacco: Never Used   Substance and Sexual Activity    Alcohol use: No    Drug use: None    Sexual activity: None   Other Topics Concern    None   Social History Narrative    None     Social Determinants of Health     Financial Resource Strain:     Difficulty of Paying Living Expenses: Not on file   Food Insecurity:     Worried About Running Out of Food in the Last Year: Not on file    [place] of Food in the Last Year: Not on file   Transportation Needs:     Lack of Transportation (Medical): Not on file    Lack of Transportation (Non-Medical):  Not on file   Physical Activity:     Days of Exercise per Week: Not on file    Minutes of Exercise per Session: Not on file   Stress:     Feeling of Stress : Not on file   Social Connections:     Frequency of Communication with Friends and Family: Not on file    Frequency of Social Gatherings with Friends and Family: Not on file    Attends Quaker Services: Not on file    Active Member of [address] [place] [address] or Organizations: Not on file    Attends Club or Organization Meetings: Not on file    Marital Status: Not on file   Intimate Partner Violence:     Fear of Current or Ex-Partner: Not on file    Emotionally Abused: Not on file    Physically Abused: Not on file    Sexually Abused: Not on file   Housing Stability:     Unable to Pay for Housing in the Last Year: Not on file    Number of [place] in the Last Year: Not on file    Unstable Housing in the Last Year: Not on file       SCREENINGS    [place] Coma Scale  Eye Opening: Spontaneous  Best Verbal Response: Oriented  Best Motor Response: Obeys commands  [place] Coma Scale Score: 15        PHYSICAL EXAM    (up to 7 for level 4, 8 or more for level 5)     ED Triage Vitals [11/09/21 0945]   BP Temp Temp Source Pulse Resp SpO2 Height Weight   (!) 157/101 98.6 °F (37 °C) Oral 75 25 96 % 6' 8\" (2.032 m) 247 lb (112 kg)       Physical Exam  Vitals and nursing note reviewed. Constitutional:       General: He is not in acute distress. Appearance: Normal appearance. He is not ill-appearing, toxic-appearing or diaphoretic. HENT:      Head: Normocephalic and atraumatic. Eyes:      Extraocular Movements: Extraocular movements intact. Pupils: Pupils are equal, round, and reactive to light. Cardiovascular:      Rate and Rhythm: Normal rate and regular rhythm. Pulmonary:      Effort: Pulmonary effort is normal.      Breath sounds: Normal breath sounds. Abdominal:      General: Abdomen is flat. Palpations: Abdomen is soft. Comments: He does have pain on palpation when I palpate otherwise there is no pain he has no complaints of abdominal pain. Musculoskeletal:         General: No tenderness. Normal range of motion. Cervical back: Normal range of motion and neck supple. Comments: Strong pedal pulses normal intact sensation movement of the feet ankles. Skin:     General: Skin is warm and dry. Capillary Refill: Capillary refill takes less than 2 seconds. Neurological:      General: No focal deficit present. Mental Status: He is alert and oriented to person, place, and time. GCS: GCS eye subscore is 4. GCS verbal subscore is 5. GCS motor subscore is 6. Sensory: No sensory deficit. Motor: No weakness.       Gait: Gait normal.   Psychiatric:         Mood and Affect: Mood normal.         Behavior: Behavior normal.         DIAGNOSTIC RESULTS     EKG: All EKG's are interpreted by the Emergency Department Physician who either signs or Co-signs this chart in the absence of a cardiologist.    EKG sinus 74 no obvious acute ischemic changes. QTc 460 ms. RADIOLOGY:   Non-plain film images such as CT, Ultrasound and MRI are read by the radiologist. Plainradiographic images are visualized and preliminarily interpreted by the emergency physician with the below findings:        Interpretation per the Radiologist below, if available at the time of this note:    CTA CHEST [address]   Final Result   1. No evidence of thoracic aorta aneurysm or dissection. 2.  Irregular nodule in the left lower lobe, may reflect scarring. Follow-up CT chest may BE obtained in 3 months to assess stability. .   Signed by Dr Emma Acevedo   Final Result   Impression:   1. Large infrarenal abdominal aortic aneurysm extending to just above   the level the bifurcation. Aneurysm is partially thrombosed. No   evidence of dissection or active hemorrhage. 2. Atherosclerotic disease. 3. Hepatic steatosis. 4. Large hiatal hernia. Signed by Dr Jhon Harris            ED BEDSIDE ULTRASOUND:   Performed by ED Physician - none    LABS:  Labs Reviewed   COMPREHENSIVE METABOLIC PANEL - Abnormal; Notable for the following components:       Result Value    Glucose 133 (*)     All other components within normal limits   CBC WITH AUTO DIFFERENTIAL - Abnormal; Notable for the following components:    RBC 4.59 (*)     MCV 99.1 (*)     MCH 32.0 (*)     MCHC 32.3 (*)     Monocytes % 12.4 (*)     Monocytes Absolute 1.10 (*)     All other components within normal limits   COVID-19, RAPID   LIPASE   PROTIME-INR   TYPE AND SCREEN       All other labs were within normal range or not returned as of this dictation.     EMERGENCY DEPARTMENT COURSE and DIFFERENTIALDIAGNOSIS/MDM:   Vitals:    Vitals:    11/09/21 0945 11/09/21 1034 11/09/21 1045   BP: (!) 157/101 128/87 (!) 124/90   Pulse: 75  74   Resp: 25  20   Temp: 98.6 °F (37 °C)  98.6 °F (37 °C)   TempSrc: Oral     SpO2: 96%  97% Weight: 247 lb (112 kg)     Height: 6' 8\" (2.032 m)         MDM  Number of Diagnoses or Management Options  Abdominal aortic aneurysm (AAA) without rupture [facility]): new and requires workup  Chronic low back pain without sciatica, unspecified back pain laterality  Crohn's disease without complication, unspecified gastrointestinal tract location [facility])  Essential hypertension  Tobacco abuse  Diagnosis management comments: Patient arrives with AAA seen incidentally on a lumbar MRI. I immediately sent him for CTA chest abdomen pelvis. He was found to have a nondissecting nonruptured AAA that is rather large 7 x 7 with 9 cm in length. Does not affect the renals or the iliacs. The patient's creatinine is normal.  His labs are otherwise reassuring. Patient does smoke. He does have hypertension on arrival he was given pain medication and 1 dose of labetalol. This improved his blood pressure. We do not have vascular surgery on call. I consulted through [facility] transfer center to transfer the patient to them given they have vascular surgery. Case discussed with Dr Ba Jenkins. Patient is awake and in no distress. I discussed that he has belly pain as well as back pain. The patient is chronic issues. We discussed transfer. Dr. Cory Warren stated that they could see the patient immediately in clinic this is probably the fastest way to have him evaluated he was otherwise stable. The patient was good with the plan. He was discharged with CD to go be seen in clinic as the timing was to be determined on surgery and he did not have any acute complaints this was all incidental finding today on MRI. Unclear if it was all related to his back pain in his belly pain versus he also has Crohn's as well as chronic back issues for years. There were no other CT scans found in our system or [facility] he is never had a CT scan of his belly he tells me even though he has Crohn's.     Patient was discharged to [facility] vascular clinic seen immediately by their attending surgeon Dr. Esme Dugan. Calling the patient later this afternoon has been admitted to the hospital for surgery tomorrow. Again after discussion with their vascular surgeon it was recommended that he be discharged to be seen in clinic at that time immediately given the whole scenario as above. Amount and/or Complexity of Data Reviewed  Clinical lab tests: ordered and reviewed  Tests in the radiology section of CPT®: ordered and reviewed  Obtain history from someone other than the patient: yes  Discuss the patient with other providers: yes  Independent visualization of images, tracings, or specimens: yes    Risk of Complications, Morbidity, and/or Mortality  Presenting problems: high    Patient Progress  Patient progress: stable        CONSULTS:  None    PROCEDURES:  Unless otherwise notedbelow, none     Procedures    FINAL IMPRESSION     1. Abdominal aortic aneurysm (AAA) without rupture [address]    2. Crohn's disease without complication, unspecified gastrointestinal tract location ([facility]    3. Chronic low back pain without sciatica, unspecified back pain laterality    4. Essential hypertension    5.  Tobacco abuse          DISPOSITION/PLAN   DISPOSITION Decision To Discharge 11/09/2021 11:26:21 AM      PATIENT REFERRED TO:  Miguel Blevins DO  [address] [facility] [address] [place] [address]    Go to   Sia Diaz MD  [address] [place] [id number] [phone]    Schedule an appointment as soon as possible for a visit in 1 week  follow up with your doctor for complete review of you CTs for any incidental findings and or BP recheck      DISCHARGE MEDICATIONS:  Discharge Medication List as of 11/9/2021 11:30 AM             (Please note that portions of this note were completed with a voice recognition program.  Efforts were made to edit the dictations butoccasionally words are mis-transcribed.)    Juancarlos Schmidt MD (electronically signed)  AttendingEmergency Physician         Marito Gonzalez MD  11/09/21 5904

## 2021-11-09 NOTE — ED NOTES
Bed: 01-A  Expected date:   Expected time:   Means of arrival:   Comments:  WILIAN Lance RN  11/09/21 9454

## 2021-11-10 ENCOUNTER — PATIENT ROUNDING (BHMG ONLY) (OUTPATIENT)
Dept: VASCULAR SURGERY | Facility: CLINIC | Age: 66
End: 2021-11-10

## 2021-11-10 ENCOUNTER — TELEPHONE (OUTPATIENT)
Dept: VASCULAR SURGERY | Facility: CLINIC | Age: 66
End: 2021-11-10

## 2021-11-10 ENCOUNTER — ANESTHESIA (OUTPATIENT)
Dept: PERIOP | Facility: HOSPITAL | Age: 66
End: 2021-11-10

## 2021-11-10 ENCOUNTER — APPOINTMENT (OUTPATIENT)
Dept: INTERVENTIONAL RADIOLOGY/VASCULAR | Facility: HOSPITAL | Age: 66
End: 2021-11-10

## 2021-11-10 ENCOUNTER — ANESTHESIA EVENT (OUTPATIENT)
Dept: PERIOP | Facility: HOSPITAL | Age: 66
End: 2021-11-10

## 2021-11-10 LAB
ANION GAP SERPL CALCULATED.3IONS-SCNC: 12 MMOL/L (ref 5–15)
BASOPHILS # BLD AUTO: 0.07 10*3/MM3 (ref 0–0.2)
BASOPHILS NFR BLD AUTO: 1 % (ref 0–1.5)
BUN SERPL-MCNC: 13 MG/DL (ref 8–23)
BUN/CREAT SERPL: 16.5 (ref 7–25)
CALCIUM SPEC-SCNC: 8.6 MG/DL (ref 8.6–10.5)
CHLORIDE SERPL-SCNC: 107 MMOL/L (ref 98–107)
CO2 SERPL-SCNC: 24 MMOL/L (ref 22–29)
CREAT SERPL-MCNC: 0.79 MG/DL (ref 0.76–1.27)
DEPRECATED RDW RBC AUTO: 47.4 FL (ref 37–54)
EKG P AXIS: 61 DEGREES
EKG P-R INTERVAL: 180 MS
EKG Q-T INTERVAL: 406 MS
EKG QRS DURATION: 78 MS
EKG QTC CALCULATION (BAZETT): 430 MS
EKG T AXIS: 53 DEGREES
EOSINOPHIL # BLD AUTO: 0.21 10*3/MM3 (ref 0–0.4)
EOSINOPHIL NFR BLD AUTO: 2.9 % (ref 0.3–6.2)
ERYTHROCYTE [DISTWIDTH] IN BLOOD BY AUTOMATED COUNT: 13.2 % (ref 12.3–15.4)
GFR SERPL CREATININE-BSD FRML MDRD: 99 ML/MIN/1.73
GLUCOSE SERPL-MCNC: 100 MG/DL (ref 65–99)
HCT VFR BLD AUTO: 39.2 % (ref 37.5–51)
HGB BLD-MCNC: 13.2 G/DL (ref 13–17.7)
IMM GRANULOCYTES # BLD AUTO: 0.03 10*3/MM3 (ref 0–0.05)
IMM GRANULOCYTES NFR BLD AUTO: 0.4 % (ref 0–0.5)
LYMPHOCYTES # BLD AUTO: 2.82 10*3/MM3 (ref 0.7–3.1)
LYMPHOCYTES NFR BLD AUTO: 39.5 % (ref 19.6–45.3)
MCH RBC QN AUTO: 33.1 PG (ref 26.6–33)
MCHC RBC AUTO-ENTMCNC: 33.7 G/DL (ref 31.5–35.7)
MCV RBC AUTO: 98.2 FL (ref 79–97)
MONOCYTES # BLD AUTO: 0.88 10*3/MM3 (ref 0.1–0.9)
MONOCYTES NFR BLD AUTO: 12.3 % (ref 5–12)
NEUTROPHILS NFR BLD AUTO: 3.13 10*3/MM3 (ref 1.7–7)
NEUTROPHILS NFR BLD AUTO: 43.9 % (ref 42.7–76)
NRBC BLD AUTO-RTO: 0 /100 WBC (ref 0–0.2)
PLATELET # BLD AUTO: 191 10*3/MM3 (ref 140–450)
PMV BLD AUTO: 10 FL (ref 6–12)
POTASSIUM SERPL-SCNC: 4.1 MMOL/L (ref 3.5–5.2)
RBC # BLD AUTO: 3.99 10*6/MM3 (ref 4.14–5.8)
SODIUM SERPL-SCNC: 143 MMOL/L (ref 136–145)
WBC # BLD AUTO: 7.14 10*3/MM3 (ref 3.4–10.8)

## 2021-11-10 PROCEDURE — 25010000002 HEPARIN (PORCINE) PER 1000 UNITS: Performed by: NURSE ANESTHETIST, CERTIFIED REGISTERED

## 2021-11-10 PROCEDURE — C1769 GUIDE WIRE: HCPCS | Performed by: SURGERY

## 2021-11-10 PROCEDURE — C1889 IMPLANT/INSERT DEVICE, NOC: HCPCS | Performed by: SURGERY

## 2021-11-10 PROCEDURE — 25010000002 PROPOFOL 10 MG/ML EMULSION: Performed by: NURSE ANESTHETIST, CERTIFIED REGISTERED

## 2021-11-10 PROCEDURE — 25010000002 FENTANYL CITRATE (PF) 100 MCG/2ML SOLUTION: Performed by: NURSE ANESTHETIST, CERTIFIED REGISTERED

## 2021-11-10 PROCEDURE — 94640 AIRWAY INHALATION TREATMENT: CPT

## 2021-11-10 PROCEDURE — C1894 INTRO/SHEATH, NON-LASER: HCPCS | Performed by: SURGERY

## 2021-11-10 PROCEDURE — 25010000002 IOPAMIDOL 61 % SOLUTION: Performed by: SURGERY

## 2021-11-10 PROCEDURE — 85025 COMPLETE CBC W/AUTO DIFF WBC: CPT | Performed by: SURGERY

## 2021-11-10 PROCEDURE — 04V03DZ RESTRICTION OF ABDOMINAL AORTA WITH INTRALUMINAL DEVICE, PERCUTANEOUS APPROACH: ICD-10-PCS | Performed by: SURGERY

## 2021-11-10 PROCEDURE — 34812 OPN FEM ART EXPOS: CPT | Performed by: SURGERY

## 2021-11-10 PROCEDURE — C1725 CATH, TRANSLUMIN NON-LASER: HCPCS | Performed by: SURGERY

## 2021-11-10 PROCEDURE — 25010000002 ALBUMIN HUMAN 5% PER 50 ML: Performed by: SURGERY

## 2021-11-10 PROCEDURE — 25010000002 CEFAZOLIN PER 500 MG: Performed by: NURSE PRACTITIONER

## 2021-11-10 PROCEDURE — 25010000002 ONDANSETRON PER 1 MG: Performed by: ANESTHESIOLOGY

## 2021-11-10 PROCEDURE — B41C1ZZ FLUOROSCOPY OF PELVIC ARTERIES USING LOW OSMOLAR CONTRAST: ICD-10-PCS | Performed by: SURGERY

## 2021-11-10 PROCEDURE — 34705 EVAC RPR A-BIILIAC NDGFT: CPT | Performed by: SURGERY

## 2021-11-10 PROCEDURE — 76000 FLUOROSCOPY <1 HR PHYS/QHP: CPT

## 2021-11-10 PROCEDURE — B4101ZZ FLUOROSCOPY OF ABDOMINAL AORTA USING LOW OSMOLAR CONTRAST: ICD-10-PCS | Performed by: SURGERY

## 2021-11-10 PROCEDURE — 80048 BASIC METABOLIC PNL TOTAL CA: CPT | Performed by: SURGERY

## 2021-11-10 PROCEDURE — C1760 CLOSURE DEV, VASC: HCPCS | Performed by: SURGERY

## 2021-11-10 PROCEDURE — 25010000002 CEFAZOLIN PER 500 MG: Performed by: SURGERY

## 2021-11-10 PROCEDURE — 93010 ELECTROCARDIOGRAM REPORT: CPT | Performed by: INTERNAL MEDICINE

## 2021-11-10 PROCEDURE — 34713 PERQ ACCESS & CLSR FEM ART: CPT | Performed by: SURGERY

## 2021-11-10 PROCEDURE — 25010000002 HEPARIN (PORCINE) PER 1000 UNITS: Performed by: SURGERY

## 2021-11-10 PROCEDURE — C1887 CATHETER, GUIDING: HCPCS | Performed by: SURGERY

## 2021-11-10 PROCEDURE — 99223 1ST HOSP IP/OBS HIGH 75: CPT | Performed by: SURGERY

## 2021-11-10 PROCEDURE — P9041 ALBUMIN (HUMAN),5%, 50ML: HCPCS | Performed by: SURGERY

## 2021-11-10 PROCEDURE — 94799 UNLISTED PULMONARY SVC/PX: CPT

## 2021-11-10 DEVICE — IMPLANTABLE DEVICE: Type: IMPLANTABLE DEVICE | Site: AORTA | Status: FUNCTIONAL

## 2021-11-10 DEVICE — STENTGR AAA ENDURANT2 BIFUR 25X14X103MM: Type: IMPLANTABLE DEVICE | Site: AORTA | Status: FUNCTIONAL

## 2021-11-10 DEVICE — STENTGR AAA ENDURANT2 LW 14F 16X16X124MM: Type: IMPLANTABLE DEVICE | Site: AORTA | Status: FUNCTIONAL

## 2021-11-10 RX ORDER — METOPROLOL SUCCINATE 100 MG/1
100 TABLET, EXTENDED RELEASE ORAL DAILY
Status: DISCONTINUED | OUTPATIENT
Start: 2021-11-10 | End: 2021-11-11 | Stop reason: HOSPADM

## 2021-11-10 RX ORDER — ALBUTEROL SULFATE 2.5 MG/3ML
2.5 SOLUTION RESPIRATORY (INHALATION) EVERY 4 HOURS PRN
Status: DISCONTINUED | OUTPATIENT
Start: 2021-11-10 | End: 2021-11-11 | Stop reason: HOSPADM

## 2021-11-10 RX ORDER — OXYCODONE AND ACETAMINOPHEN 10; 325 MG/1; MG/1
1 TABLET ORAL ONCE AS NEEDED
Status: COMPLETED | OUTPATIENT
Start: 2021-11-10 | End: 2021-11-10

## 2021-11-10 RX ORDER — BUPIVACAINE HYDROCHLORIDE 5 MG/ML
INJECTION, SOLUTION PERINEURAL AS NEEDED
Status: DISCONTINUED | OUTPATIENT
Start: 2021-11-10 | End: 2021-11-10 | Stop reason: HOSPADM

## 2021-11-10 RX ORDER — HYDROCODONE BITARTRATE AND ACETAMINOPHEN 7.5; 325 MG/1; MG/1
1 TABLET ORAL 3 TIMES DAILY PRN
Status: DISCONTINUED | OUTPATIENT
Start: 2021-11-10 | End: 2021-11-11 | Stop reason: HOSPADM

## 2021-11-10 RX ORDER — ACETAMINOPHEN 325 MG/1
650 TABLET ORAL EVERY 4 HOURS PRN
Status: DISCONTINUED | OUTPATIENT
Start: 2021-11-10 | End: 2021-11-11 | Stop reason: HOSPADM

## 2021-11-10 RX ORDER — FENTANYL CITRATE 50 UG/ML
INJECTION, SOLUTION INTRAMUSCULAR; INTRAVENOUS AS NEEDED
Status: DISCONTINUED | OUTPATIENT
Start: 2021-11-10 | End: 2021-11-10 | Stop reason: SURG

## 2021-11-10 RX ORDER — FLUMAZENIL 0.1 MG/ML
0.2 INJECTION INTRAVENOUS AS NEEDED
Status: DISCONTINUED | OUTPATIENT
Start: 2021-11-10 | End: 2021-11-10 | Stop reason: HOSPADM

## 2021-11-10 RX ORDER — NALOXONE HCL 0.4 MG/ML
0.04 VIAL (ML) INJECTION AS NEEDED
Status: DISCONTINUED | OUTPATIENT
Start: 2021-11-10 | End: 2021-11-10 | Stop reason: HOSPADM

## 2021-11-10 RX ORDER — HYDROMORPHONE HYDROCHLORIDE 1 MG/ML
0.2 INJECTION, SOLUTION INTRAMUSCULAR; INTRAVENOUS; SUBCUTANEOUS
Status: DISCONTINUED | OUTPATIENT
Start: 2021-11-10 | End: 2021-11-10 | Stop reason: HOSPADM

## 2021-11-10 RX ORDER — ONDANSETRON 2 MG/ML
4 INJECTION INTRAMUSCULAR; INTRAVENOUS EVERY 6 HOURS PRN
Status: DISCONTINUED | OUTPATIENT
Start: 2021-11-10 | End: 2021-11-11 | Stop reason: HOSPADM

## 2021-11-10 RX ORDER — SODIUM CHLORIDE 0.9 % (FLUSH) 0.9 %
3-10 SYRINGE (ML) INJECTION AS NEEDED
Status: DISCONTINUED | OUTPATIENT
Start: 2021-11-10 | End: 2021-11-10 | Stop reason: HOSPADM

## 2021-11-10 RX ORDER — LIDOCAINE HYDROCHLORIDE 20 MG/ML
INJECTION, SOLUTION EPIDURAL; INFILTRATION; INTRACAUDAL; PERINEURAL AS NEEDED
Status: DISCONTINUED | OUTPATIENT
Start: 2021-11-10 | End: 2021-11-10 | Stop reason: SURG

## 2021-11-10 RX ORDER — IBUPROFEN 600 MG/1
600 TABLET ORAL ONCE AS NEEDED
Status: DISCONTINUED | OUTPATIENT
Start: 2021-11-10 | End: 2021-11-10 | Stop reason: HOSPADM

## 2021-11-10 RX ORDER — ONDANSETRON 4 MG/1
4 TABLET, FILM COATED ORAL EVERY 6 HOURS PRN
Status: DISCONTINUED | OUTPATIENT
Start: 2021-11-10 | End: 2021-11-11 | Stop reason: HOSPADM

## 2021-11-10 RX ORDER — BUDESONIDE AND FORMOTEROL FUMARATE DIHYDRATE 160; 4.5 UG/1; UG/1
2 AEROSOL RESPIRATORY (INHALATION)
Status: DISCONTINUED | OUTPATIENT
Start: 2021-11-10 | End: 2021-11-11 | Stop reason: HOSPADM

## 2021-11-10 RX ORDER — SODIUM CHLORIDE 9 MG/ML
75 INJECTION, SOLUTION INTRAVENOUS CONTINUOUS
Status: DISCONTINUED | OUTPATIENT
Start: 2021-11-10 | End: 2021-11-11 | Stop reason: HOSPADM

## 2021-11-10 RX ORDER — MESALAMINE 0.38 G/1
0.38 CAPSULE, EXTENDED RELEASE ORAL DAILY
Status: DISCONTINUED | OUTPATIENT
Start: 2021-11-11 | End: 2021-11-11 | Stop reason: HOSPADM

## 2021-11-10 RX ORDER — SODIUM CHLORIDE 0.9 % (FLUSH) 0.9 %
3 SYRINGE (ML) INJECTION EVERY 12 HOURS SCHEDULED
Status: DISCONTINUED | OUTPATIENT
Start: 2021-11-10 | End: 2021-11-10 | Stop reason: HOSPADM

## 2021-11-10 RX ORDER — ASPIRIN 81 MG/1
81 TABLET ORAL DAILY
Status: DISCONTINUED | OUTPATIENT
Start: 2021-11-10 | End: 2021-11-11 | Stop reason: HOSPADM

## 2021-11-10 RX ORDER — LIDOCAINE HYDROCHLORIDE 40 MG/ML
SOLUTION TOPICAL AS NEEDED
Status: DISCONTINUED | OUTPATIENT
Start: 2021-11-10 | End: 2021-11-10 | Stop reason: SURG

## 2021-11-10 RX ORDER — LABETALOL HYDROCHLORIDE 5 MG/ML
5 INJECTION, SOLUTION INTRAVENOUS
Status: DISCONTINUED | OUTPATIENT
Start: 2021-11-10 | End: 2021-11-10 | Stop reason: HOSPADM

## 2021-11-10 RX ORDER — HEPARIN SODIUM 1000 [USP'U]/ML
INJECTION, SOLUTION INTRAVENOUS; SUBCUTANEOUS AS NEEDED
Status: DISCONTINUED | OUTPATIENT
Start: 2021-11-10 | End: 2021-11-10 | Stop reason: SURG

## 2021-11-10 RX ORDER — LIDOCAINE HYDROCHLORIDE 10 MG/ML
0.5 INJECTION, SOLUTION EPIDURAL; INFILTRATION; INTRACAUDAL; PERINEURAL ONCE AS NEEDED
Status: DISCONTINUED | OUTPATIENT
Start: 2021-11-10 | End: 2021-11-10 | Stop reason: HOSPADM

## 2021-11-10 RX ORDER — SODIUM CHLORIDE 0.9 % (FLUSH) 0.9 %
10 SYRINGE (ML) INJECTION EVERY 12 HOURS SCHEDULED
Status: DISCONTINUED | OUTPATIENT
Start: 2021-11-10 | End: 2021-11-11 | Stop reason: HOSPADM

## 2021-11-10 RX ORDER — NALOXONE HCL 0.4 MG/ML
0.4 VIAL (ML) INJECTION
Status: DISCONTINUED | OUTPATIENT
Start: 2021-11-10 | End: 2021-11-11 | Stop reason: HOSPADM

## 2021-11-10 RX ORDER — LISINOPRIL 20 MG/1
40 TABLET ORAL DAILY
Status: DISCONTINUED | OUTPATIENT
Start: 2021-11-10 | End: 2021-11-11 | Stop reason: HOSPADM

## 2021-11-10 RX ORDER — PANTOPRAZOLE SODIUM 40 MG/1
40 TABLET, DELAYED RELEASE ORAL
Status: DISCONTINUED | OUTPATIENT
Start: 2021-11-11 | End: 2021-11-11 | Stop reason: HOSPADM

## 2021-11-10 RX ORDER — FAMOTIDINE 20 MG/1
40 TABLET, FILM COATED ORAL NIGHTLY
Status: DISCONTINUED | OUTPATIENT
Start: 2021-11-10 | End: 2021-11-11 | Stop reason: HOSPADM

## 2021-11-10 RX ORDER — TRAZODONE HYDROCHLORIDE 50 MG/1
50 TABLET ORAL NIGHTLY
Status: DISCONTINUED | OUTPATIENT
Start: 2021-11-10 | End: 2021-11-11 | Stop reason: HOSPADM

## 2021-11-10 RX ORDER — BUPIVACAINE HCL/0.9 % NACL/PF 0.1 %
2 PLASTIC BAG, INJECTION (ML) EPIDURAL EVERY 8 HOURS SCHEDULED
Status: COMPLETED | OUTPATIENT
Start: 2021-11-10 | End: 2021-11-11

## 2021-11-10 RX ORDER — ATORVASTATIN CALCIUM 10 MG/1
20 TABLET, FILM COATED ORAL NIGHTLY
Status: DISCONTINUED | OUTPATIENT
Start: 2021-11-10 | End: 2021-11-11 | Stop reason: HOSPADM

## 2021-11-10 RX ORDER — SODIUM CHLORIDE 0.9 % (FLUSH) 0.9 %
10 SYRINGE (ML) INJECTION AS NEEDED
Status: DISCONTINUED | OUTPATIENT
Start: 2021-11-10 | End: 2021-11-11 | Stop reason: HOSPADM

## 2021-11-10 RX ORDER — PROPOFOL 10 MG/ML
VIAL (ML) INTRAVENOUS AS NEEDED
Status: DISCONTINUED | OUTPATIENT
Start: 2021-11-10 | End: 2021-11-10 | Stop reason: SURG

## 2021-11-10 RX ORDER — ALBUMIN, HUMAN INJ 5% 5 %
12.5 SOLUTION INTRAVENOUS ONCE
Status: COMPLETED | OUTPATIENT
Start: 2021-11-10 | End: 2021-11-10

## 2021-11-10 RX ORDER — ACETAMINOPHEN 500 MG
1000 TABLET ORAL ONCE
Status: COMPLETED | OUTPATIENT
Start: 2021-11-10 | End: 2021-11-10

## 2021-11-10 RX ORDER — FENTANYL CITRATE 50 UG/ML
25 INJECTION, SOLUTION INTRAMUSCULAR; INTRAVENOUS
Status: DISCONTINUED | OUTPATIENT
Start: 2021-11-10 | End: 2021-11-10 | Stop reason: HOSPADM

## 2021-11-10 RX ORDER — SODIUM CHLORIDE, SODIUM LACTATE, POTASSIUM CHLORIDE, CALCIUM CHLORIDE 600; 310; 30; 20 MG/100ML; MG/100ML; MG/100ML; MG/100ML
100 INJECTION, SOLUTION INTRAVENOUS CONTINUOUS
Status: DISCONTINUED | OUTPATIENT
Start: 2021-11-10 | End: 2021-11-10 | Stop reason: HOSPADM

## 2021-11-10 RX ORDER — ALLOPURINOL 300 MG/1
300 TABLET ORAL NIGHTLY
Status: DISCONTINUED | OUTPATIENT
Start: 2021-11-10 | End: 2021-11-11 | Stop reason: HOSPADM

## 2021-11-10 RX ORDER — SODIUM CHLORIDE, SODIUM LACTATE, POTASSIUM CHLORIDE, CALCIUM CHLORIDE 600; 310; 30; 20 MG/100ML; MG/100ML; MG/100ML; MG/100ML
30 INJECTION, SOLUTION INTRAVENOUS CONTINUOUS
Status: DISCONTINUED | OUTPATIENT
Start: 2021-11-10 | End: 2021-11-10 | Stop reason: HOSPADM

## 2021-11-10 RX ORDER — ROCURONIUM BROMIDE 10 MG/ML
INJECTION, SOLUTION INTRAVENOUS AS NEEDED
Status: DISCONTINUED | OUTPATIENT
Start: 2021-11-10 | End: 2021-11-10 | Stop reason: SURG

## 2021-11-10 RX ORDER — BUPIVACAINE HCL/0.9 % NACL/PF 0.1 %
2 PLASTIC BAG, INJECTION (ML) EPIDURAL ONCE
Status: COMPLETED | OUTPATIENT
Start: 2021-11-10 | End: 2021-11-10

## 2021-11-10 RX ORDER — ONDANSETRON 2 MG/ML
4 INJECTION INTRAMUSCULAR; INTRAVENOUS AS NEEDED
Status: DISCONTINUED | OUTPATIENT
Start: 2021-11-10 | End: 2021-11-10 | Stop reason: HOSPADM

## 2021-11-10 RX ADMIN — ALLOPURINOL 300 MG: 300 TABLET ORAL at 20:17

## 2021-11-10 RX ADMIN — ROCURONIUM BROMIDE 50 MG: 50 INJECTION INTRAVENOUS at 13:13

## 2021-11-10 RX ADMIN — SODIUM CHLORIDE, POTASSIUM CHLORIDE, SODIUM LACTATE AND CALCIUM CHLORIDE 30 ML/HR: 600; 310; 30; 20 INJECTION, SOLUTION INTRAVENOUS at 14:49

## 2021-11-10 RX ADMIN — OXYCODONE AND ACETAMINOPHEN 1 TABLET: 325; 10 TABLET ORAL at 15:20

## 2021-11-10 RX ADMIN — ATORVASTATIN CALCIUM 20 MG: 10 TABLET, FILM COATED ORAL at 20:17

## 2021-11-10 RX ADMIN — HYDROCODONE BITARTRATE AND ACETAMINOPHEN 1 TABLET: 7.5; 325 TABLET ORAL at 06:14

## 2021-11-10 RX ADMIN — SODIUM CHLORIDE, PRESERVATIVE FREE 10 ML: 5 INJECTION INTRAVENOUS at 20:17

## 2021-11-10 RX ADMIN — HEPARIN SODIUM 1000 UNITS: 1000 INJECTION, SOLUTION INTRAVENOUS; SUBCUTANEOUS at 13:37

## 2021-11-10 RX ADMIN — LISINOPRIL 40 MG: 20 TABLET ORAL at 17:39

## 2021-11-10 RX ADMIN — SODIUM CHLORIDE, POTASSIUM CHLORIDE, SODIUM LACTATE AND CALCIUM CHLORIDE 100 ML/HR: 600; 310; 30; 20 INJECTION, SOLUTION INTRAVENOUS at 11:12

## 2021-11-10 RX ADMIN — PROPOFOL 150 MG: 10 INJECTION, EMULSION INTRAVENOUS at 13:13

## 2021-11-10 RX ADMIN — ALBUMIN HUMAN 12.5 G: 0.05 INJECTION, SOLUTION INTRAVENOUS at 14:40

## 2021-11-10 RX ADMIN — CEFAZOLIN 2 G: 10 INJECTION, POWDER, FOR SOLUTION INTRAVENOUS at 21:35

## 2021-11-10 RX ADMIN — HYDROCODONE BITARTRATE AND ACETAMINOPHEN 1 TABLET: 7.5; 325 TABLET ORAL at 20:16

## 2021-11-10 RX ADMIN — Medication 2 G: at 13:18

## 2021-11-10 RX ADMIN — BUDESONIDE AND FORMOTEROL FUMARATE DIHYDRATE 2 PUFF: 160; 4.5 AEROSOL RESPIRATORY (INHALATION) at 20:51

## 2021-11-10 RX ADMIN — TRAZODONE HYDROCHLORIDE 50 MG: 50 TABLET ORAL at 20:17

## 2021-11-10 RX ADMIN — METOPROLOL SUCCINATE 100 MG: 100 TABLET, EXTENDED RELEASE ORAL at 17:39

## 2021-11-10 RX ADMIN — FAMOTIDINE 40 MG: 20 TABLET, FILM COATED ORAL at 20:17

## 2021-11-10 RX ADMIN — LIDOCAINE HYDROCHLORIDE 1 EACH: 40 SOLUTION TOPICAL at 13:13

## 2021-11-10 RX ADMIN — NICOTINE 1 PATCH: 21 PATCH, EXTENDED RELEASE TRANSDERMAL at 09:16

## 2021-11-10 RX ADMIN — SODIUM CHLORIDE, POTASSIUM CHLORIDE, SODIUM LACTATE AND CALCIUM CHLORIDE 30 ML/HR: 600; 310; 30; 20 INJECTION, SOLUTION INTRAVENOUS at 11:05

## 2021-11-10 RX ADMIN — LIDOCAINE HYDROCHLORIDE 50 MG: 20 INJECTION, SOLUTION EPIDURAL; INFILTRATION; INTRACAUDAL; PERINEURAL at 13:13

## 2021-11-10 RX ADMIN — SODIUM CHLORIDE 75 ML/HR: 9 INJECTION, SOLUTION INTRAVENOUS at 17:40

## 2021-11-10 RX ADMIN — HEPARIN SODIUM 4000 UNITS: 1000 INJECTION, SOLUTION INTRAVENOUS; SUBCUTANEOUS at 13:41

## 2021-11-10 RX ADMIN — ASPIRIN 81 MG: 81 TABLET, COATED ORAL at 17:39

## 2021-11-10 RX ADMIN — FENTANYL CITRATE 100 MCG: 50 INJECTION, SOLUTION INTRAMUSCULAR; INTRAVENOUS at 13:13

## 2021-11-10 RX ADMIN — ACETAMINOPHEN 1000 MG: 500 TABLET, FILM COATED ORAL at 11:15

## 2021-11-10 RX ADMIN — ONDANSETRON 4 MG: 2 INJECTION INTRAMUSCULAR; INTRAVENOUS at 15:20

## 2021-11-11 VITALS
DIASTOLIC BLOOD PRESSURE: 78 MMHG | WEIGHT: 247.75 LBS | HEIGHT: 68 IN | HEART RATE: 82 BPM | TEMPERATURE: 98.3 F | SYSTOLIC BLOOD PRESSURE: 147 MMHG | BODY MASS INDEX: 37.55 KG/M2 | OXYGEN SATURATION: 93 % | RESPIRATION RATE: 16 BRPM

## 2021-11-11 PROCEDURE — 94799 UNLISTED PULMONARY SVC/PX: CPT

## 2021-11-11 PROCEDURE — 25010000002 CEFAZOLIN PER 500 MG: Performed by: SURGERY

## 2021-11-11 PROCEDURE — 99024 POSTOP FOLLOW-UP VISIT: CPT | Performed by: NURSE PRACTITIONER

## 2021-11-11 PROCEDURE — 99239 HOSP IP/OBS DSCHRG MGMT >30: CPT | Performed by: NURSE PRACTITIONER

## 2021-11-11 PROCEDURE — 25010000002 ONDANSETRON PER 1 MG: Performed by: SURGERY

## 2021-11-11 PROCEDURE — 25010000002 MORPHINE PER 10 MG: Performed by: SURGERY

## 2021-11-11 RX ORDER — ONDANSETRON 4 MG/1
4 TABLET, FILM COATED ORAL EVERY 6 HOURS PRN
Qty: 30 TABLET | Refills: 0 | Status: SHIPPED | OUTPATIENT
Start: 2021-11-11

## 2021-11-11 RX ORDER — ASPIRIN 81 MG/1
81 TABLET ORAL DAILY
Start: 2021-11-11 | End: 2021-12-13

## 2021-11-11 RX ADMIN — ONDANSETRON 4 MG: 2 INJECTION INTRAMUSCULAR; INTRAVENOUS at 00:26

## 2021-11-11 RX ADMIN — METOPROLOL SUCCINATE 100 MG: 100 TABLET, EXTENDED RELEASE ORAL at 09:29

## 2021-11-11 RX ADMIN — MESALAMINE 0.38 G: 375 CAPSULE, EXTENDED RELEASE ORAL at 09:30

## 2021-11-11 RX ADMIN — SODIUM CHLORIDE 75 ML/HR: 9 INJECTION, SOLUTION INTRAVENOUS at 06:13

## 2021-11-11 RX ADMIN — CEFAZOLIN 2 G: 10 INJECTION, POWDER, FOR SOLUTION INTRAVENOUS at 06:12

## 2021-11-11 RX ADMIN — HYDROCODONE BITARTRATE AND ACETAMINOPHEN 1 TABLET: 7.5; 325 TABLET ORAL at 09:24

## 2021-11-11 RX ADMIN — LISINOPRIL 40 MG: 20 TABLET ORAL at 09:29

## 2021-11-11 RX ADMIN — ASPIRIN 81 MG: 81 TABLET, COATED ORAL at 09:29

## 2021-11-11 RX ADMIN — BUDESONIDE AND FORMOTEROL FUMARATE DIHYDRATE 2 PUFF: 160; 4.5 AEROSOL RESPIRATORY (INHALATION) at 06:58

## 2021-11-11 RX ADMIN — MORPHINE SULFATE 4 MG: 4 INJECTION, SOLUTION INTRAMUSCULAR; INTRAVENOUS at 00:26

## 2021-11-19 ENCOUNTER — TELEPHONE (OUTPATIENT)
Dept: VASCULAR SURGERY | Facility: CLINIC | Age: 66
End: 2021-11-19

## 2021-11-22 ENCOUNTER — HOSPITAL ENCOUNTER (EMERGENCY)
Facility: HOSPITAL | Age: 66
End: 2021-11-22

## 2021-11-22 ENCOUNTER — TRANSCRIBE ORDERS (OUTPATIENT)
Dept: LAB | Facility: HOSPITAL | Age: 66
End: 2021-11-22

## 2021-11-22 ENCOUNTER — OFFICE VISIT (OUTPATIENT)
Dept: VASCULAR SURGERY | Facility: CLINIC | Age: 66
End: 2021-11-22

## 2021-11-22 VITALS
OXYGEN SATURATION: 99 % | HEART RATE: 89 BPM | BODY MASS INDEX: 30.71 KG/M2 | SYSTOLIC BLOOD PRESSURE: 138 MMHG | HEIGHT: 75 IN | WEIGHT: 247 LBS | DIASTOLIC BLOOD PRESSURE: 88 MMHG

## 2021-11-22 DIAGNOSIS — I71.40 ABDOMINAL AORTIC ANEURYSM (AAA) WITHOUT RUPTURE (HCC): Primary | ICD-10-CM

## 2021-11-22 DIAGNOSIS — Z01.818 PREOP TESTING: Primary | ICD-10-CM

## 2021-11-22 DIAGNOSIS — Z72.0 TOBACCO USE: ICD-10-CM

## 2021-11-22 DIAGNOSIS — I10 PRIMARY HYPERTENSION: ICD-10-CM

## 2021-11-22 DIAGNOSIS — E78.5 HYPERLIPIDEMIA, UNSPECIFIED HYPERLIPIDEMIA TYPE: ICD-10-CM

## 2021-11-22 PROCEDURE — 99024 POSTOP FOLLOW-UP VISIT: CPT | Performed by: NURSE PRACTITIONER

## 2021-12-08 ENCOUNTER — TELEPHONE (OUTPATIENT)
Dept: VASCULAR SURGERY | Facility: CLINIC | Age: 66
End: 2021-12-08

## 2021-12-09 ENCOUNTER — OFFICE VISIT (OUTPATIENT)
Dept: VASCULAR SURGERY | Facility: CLINIC | Age: 66
End: 2021-12-09

## 2021-12-09 ENCOUNTER — TELEPHONE (OUTPATIENT)
Dept: VASCULAR SURGERY | Facility: CLINIC | Age: 66
End: 2021-12-09

## 2021-12-09 ENCOUNTER — HOSPITAL ENCOUNTER (OUTPATIENT)
Dept: CT IMAGING | Facility: HOSPITAL | Age: 66
Discharge: HOME | End: 2021-12-09
Admitting: NURSE PRACTITIONER

## 2021-12-09 VITALS
SYSTOLIC BLOOD PRESSURE: 150 MMHG | RESPIRATION RATE: 18 BRPM | BODY MASS INDEX: 29.22 KG/M2 | WEIGHT: 235 LBS | OXYGEN SATURATION: 96 % | HEART RATE: 84 BPM | HEIGHT: 75 IN | DIASTOLIC BLOOD PRESSURE: 100 MMHG

## 2021-12-09 DIAGNOSIS — Z01.818 PREOP TESTING: ICD-10-CM

## 2021-12-09 DIAGNOSIS — Z51.81 ENCOUNTER FOR MONITORING ANTIPLATELET THERAPY: ICD-10-CM

## 2021-12-09 DIAGNOSIS — T82.858A STENOSIS OF OTHER VASCULAR PROSTHETIC DEVICES, IMPLANTS AND GRAFTS, INITIAL ENCOUNTER (HCC): Primary | ICD-10-CM

## 2021-12-09 DIAGNOSIS — Z98.890 S/P AAA (ABDOMINAL AORTIC ANEURYSM) REPAIR: ICD-10-CM

## 2021-12-09 DIAGNOSIS — Z79.02 ENCOUNTER FOR MONITORING ANTIPLATELET THERAPY: ICD-10-CM

## 2021-12-09 DIAGNOSIS — Z86.79 S/P AAA (ABDOMINAL AORTIC ANEURYSM) REPAIR: ICD-10-CM

## 2021-12-09 DIAGNOSIS — I71.40 ABDOMINAL AORTIC ANEURYSM (AAA) WITHOUT RUPTURE (HCC): ICD-10-CM

## 2021-12-09 PROBLEM — J20.9 ACUTE BRONCHITIS: Status: ACTIVE | Noted: 2021-12-09

## 2021-12-09 PROBLEM — M19.90 ARTHRITIS: Status: ACTIVE | Noted: 2021-12-09

## 2021-12-09 PROBLEM — R07.9 CHEST PAIN: Status: ACTIVE | Noted: 2021-12-09

## 2021-12-09 PROBLEM — J44.9 COPD (CHRONIC OBSTRUCTIVE PULMONARY DISEASE): Status: ACTIVE | Noted: 2021-12-09

## 2021-12-09 PROBLEM — M10.9 GOUT: Status: ACTIVE | Noted: 2021-12-09

## 2021-12-09 PROBLEM — K22.70 BARRETT'S ESOPHAGUS: Status: ACTIVE | Noted: 2021-04-29

## 2021-12-09 PROBLEM — R42 DIZZINESS: Status: ACTIVE | Noted: 2021-12-09

## 2021-12-09 PROBLEM — IMO0002 FOREIGN BODY: Status: ACTIVE | Noted: 2021-12-09

## 2021-12-09 PROBLEM — E11.9 TYPE 2 DIABETES MELLITUS: Status: ACTIVE | Noted: 2021-12-09

## 2021-12-09 PROCEDURE — 0 IOPAMIDOL PER 1 ML: Performed by: NURSE PRACTITIONER

## 2021-12-09 PROCEDURE — 99024 POSTOP FOLLOW-UP VISIT: CPT | Performed by: SURGERY

## 2021-12-09 PROCEDURE — 74174 CTA ABD&PLVS W/CONTRAST: CPT

## 2021-12-09 PROCEDURE — 82565 ASSAY OF CREATININE: CPT

## 2021-12-09 RX ORDER — CLOPIDOGREL BISULFATE 75 MG/1
75 TABLET ORAL DAILY
Qty: 30 TABLET | Refills: 5 | Status: SHIPPED | OUTPATIENT
Start: 2021-12-09 | End: 2022-01-08

## 2021-12-09 RX ORDER — ALBUTEROL SULFATE 2.5 MG/3ML
2.5 SOLUTION RESPIRATORY (INHALATION) EVERY 4 HOURS PRN
COMMUNITY

## 2021-12-09 RX ADMIN — IOPAMIDOL 100 ML: 755 INJECTION, SOLUTION INTRAVENOUS at 07:54

## 2021-12-10 LAB — CREAT BLDA-MCNC: 0.9 MG/DL (ref 0.6–1.3)

## 2021-12-13 ENCOUNTER — LAB (OUTPATIENT)
Dept: LAB | Facility: HOSPITAL | Age: 66
End: 2021-12-13

## 2021-12-13 ENCOUNTER — PRE-ADMISSION TESTING (OUTPATIENT)
Dept: PREADMISSION TESTING | Facility: HOSPITAL | Age: 66
End: 2021-12-13

## 2021-12-13 VITALS
BODY MASS INDEX: 29.14 KG/M2 | WEIGHT: 234.35 LBS | HEART RATE: 82 BPM | DIASTOLIC BLOOD PRESSURE: 74 MMHG | OXYGEN SATURATION: 98 % | HEIGHT: 75 IN | RESPIRATION RATE: 18 BRPM | SYSTOLIC BLOOD PRESSURE: 124 MMHG

## 2021-12-13 DIAGNOSIS — T82.858A STENOSIS OF OTHER VASCULAR PROSTHETIC DEVICES, IMPLANTS AND GRAFTS, INITIAL ENCOUNTER (HCC): ICD-10-CM

## 2021-12-13 DIAGNOSIS — Z79.02 ENCOUNTER FOR MONITORING ANTIPLATELET THERAPY: ICD-10-CM

## 2021-12-13 DIAGNOSIS — Z98.890 S/P AAA (ABDOMINAL AORTIC ANEURYSM) REPAIR: ICD-10-CM

## 2021-12-13 DIAGNOSIS — Z86.79 S/P AAA (ABDOMINAL AORTIC ANEURYSM) REPAIR: ICD-10-CM

## 2021-12-13 DIAGNOSIS — Z01.818 PREOP TESTING: ICD-10-CM

## 2021-12-13 DIAGNOSIS — Z51.81 ENCOUNTER FOR MONITORING ANTIPLATELET THERAPY: ICD-10-CM

## 2021-12-13 LAB
ANION GAP SERPL CALCULATED.3IONS-SCNC: 11 MMOL/L (ref 5–15)
APTT PPP: 29.9 SECONDS (ref 24.1–35)
BASOPHILS # BLD AUTO: 0.04 10*3/MM3 (ref 0–0.2)
BASOPHILS NFR BLD AUTO: 0.6 % (ref 0–1.5)
BUN SERPL-MCNC: 13 MG/DL (ref 8–23)
BUN/CREAT SERPL: 15.5 (ref 7–25)
CALCIUM SPEC-SCNC: 9.7 MG/DL (ref 8.6–10.5)
CHLORIDE SERPL-SCNC: 103 MMOL/L (ref 98–107)
CO2 SERPL-SCNC: 24 MMOL/L (ref 22–29)
CREAT SERPL-MCNC: 0.84 MG/DL (ref 0.76–1.27)
DEPRECATED RDW RBC AUTO: 44.8 FL (ref 37–54)
EOSINOPHIL # BLD AUTO: 0.2 10*3/MM3 (ref 0–0.4)
EOSINOPHIL NFR BLD AUTO: 3.2 % (ref 0.3–6.2)
ERYTHROCYTE [DISTWIDTH] IN BLOOD BY AUTOMATED COUNT: 13.1 % (ref 12.3–15.4)
GFR SERPL CREATININE-BSD FRML MDRD: 92 ML/MIN/1.73
GLUCOSE SERPL-MCNC: 117 MG/DL (ref 65–99)
HCT VFR BLD AUTO: 40.8 % (ref 37.5–51)
HGB BLD-MCNC: 13.3 G/DL (ref 13–17.7)
IMM GRANULOCYTES # BLD AUTO: 0.03 10*3/MM3 (ref 0–0.05)
IMM GRANULOCYTES NFR BLD AUTO: 0.5 % (ref 0–0.5)
INR PPP: 1.01 (ref 0.91–1.09)
LYMPHOCYTES # BLD AUTO: 1.56 10*3/MM3 (ref 0.7–3.1)
LYMPHOCYTES NFR BLD AUTO: 25.3 % (ref 19.6–45.3)
MCH RBC QN AUTO: 30.6 PG (ref 26.6–33)
MCHC RBC AUTO-ENTMCNC: 32.6 G/DL (ref 31.5–35.7)
MCV RBC AUTO: 94 FL (ref 79–97)
MONOCYTES # BLD AUTO: 0.89 10*3/MM3 (ref 0.1–0.9)
MONOCYTES NFR BLD AUTO: 14.4 % (ref 5–12)
NEUTROPHILS NFR BLD AUTO: 3.44 10*3/MM3 (ref 1.7–7)
NEUTROPHILS NFR BLD AUTO: 56 % (ref 42.7–76)
NRBC BLD AUTO-RTO: 0 /100 WBC (ref 0–0.2)
PLATELET # BLD AUTO: 253 10*3/MM3 (ref 140–450)
PMV BLD AUTO: 9.5 FL (ref 6–12)
POTASSIUM SERPL-SCNC: 4.2 MMOL/L (ref 3.5–5.2)
PROTHROMBIN TIME: 12.9 SECONDS (ref 11.9–14.6)
RBC # BLD AUTO: 4.34 10*6/MM3 (ref 4.14–5.8)
SARS-COV-2 ORF1AB RESP QL NAA+PROBE: NOT DETECTED
SODIUM SERPL-SCNC: 138 MMOL/L (ref 136–145)
WBC NRBC COR # BLD: 6.16 10*3/MM3 (ref 3.4–10.8)

## 2021-12-13 PROCEDURE — 36415 COLL VENOUS BLD VENIPUNCTURE: CPT

## 2021-12-13 PROCEDURE — 85025 COMPLETE CBC W/AUTO DIFF WBC: CPT

## 2021-12-13 PROCEDURE — 85610 PROTHROMBIN TIME: CPT

## 2021-12-13 PROCEDURE — C9803 HOPD COVID-19 SPEC COLLECT: HCPCS | Performed by: PAIN MEDICINE

## 2021-12-13 PROCEDURE — 80048 BASIC METABOLIC PNL TOTAL CA: CPT

## 2021-12-13 PROCEDURE — U0004 COV-19 TEST NON-CDC HGH THRU: HCPCS | Performed by: PAIN MEDICINE

## 2021-12-13 PROCEDURE — 93010 ELECTROCARDIOGRAM REPORT: CPT | Performed by: INTERNAL MEDICINE

## 2021-12-13 PROCEDURE — 85730 THROMBOPLASTIN TIME PARTIAL: CPT

## 2021-12-13 PROCEDURE — 93005 ELECTROCARDIOGRAM TRACING: CPT

## 2021-12-13 RX ORDER — FAMOTIDINE 40 MG/1
40 TABLET, FILM COATED ORAL NIGHTLY
COMMUNITY

## 2021-12-13 RX ORDER — HYDROCODONE BITARTRATE AND ACETAMINOPHEN 7.5; 325 MG/1; MG/1
1 TABLET ORAL EVERY 8 HOURS PRN
COMMUNITY

## 2021-12-14 ENCOUNTER — TELEPHONE (OUTPATIENT)
Dept: VASCULAR SURGERY | Facility: CLINIC | Age: 66
End: 2021-12-14

## 2021-12-14 LAB
QT INTERVAL: 374 MS
QTC INTERVAL: 431 MS

## 2021-12-15 ENCOUNTER — ANESTHESIA EVENT (OUTPATIENT)
Dept: PERIOP | Facility: HOSPITAL | Age: 66
End: 2021-12-15

## 2021-12-15 ENCOUNTER — ANESTHESIA (OUTPATIENT)
Dept: PERIOP | Facility: HOSPITAL | Age: 66
End: 2021-12-15

## 2021-12-15 ENCOUNTER — APPOINTMENT (OUTPATIENT)
Dept: INTERVENTIONAL RADIOLOGY/VASCULAR | Facility: HOSPITAL | Age: 66
End: 2021-12-15

## 2021-12-15 ENCOUNTER — HOSPITAL ENCOUNTER (OUTPATIENT)
Facility: HOSPITAL | Age: 66
Setting detail: HOSPITAL OUTPATIENT SURGERY
Discharge: HOME | End: 2021-12-15
Attending: SURGERY | Admitting: SURGERY

## 2021-12-15 VITALS
OXYGEN SATURATION: 96 % | SYSTOLIC BLOOD PRESSURE: 129 MMHG | RESPIRATION RATE: 18 BRPM | DIASTOLIC BLOOD PRESSURE: 84 MMHG | HEART RATE: 75 BPM | TEMPERATURE: 97.9 F

## 2021-12-15 DIAGNOSIS — Z86.79 S/P AAA (ABDOMINAL AORTIC ANEURYSM) REPAIR: ICD-10-CM

## 2021-12-15 DIAGNOSIS — Z01.818 PREOP TESTING: ICD-10-CM

## 2021-12-15 DIAGNOSIS — Z98.890 S/P AAA (ABDOMINAL AORTIC ANEURYSM) REPAIR: ICD-10-CM

## 2021-12-15 DIAGNOSIS — T82.858A STENOSIS OF OTHER VASCULAR PROSTHETIC DEVICES, IMPLANTS AND GRAFTS, INITIAL ENCOUNTER (HCC): ICD-10-CM

## 2021-12-15 LAB
ABO GROUP BLD: NORMAL
BLD GP AB SCN SERPL QL: NEGATIVE
GLUCOSE BLDC GLUCOMTR-MCNC: 116 MG/DL (ref 70–130)
GLUCOSE BLDC GLUCOMTR-MCNC: 130 MG/DL (ref 70–130)
RH BLD: POSITIVE
T&S EXPIRATION DATE: NORMAL

## 2021-12-15 PROCEDURE — 82962 GLUCOSE BLOOD TEST: CPT

## 2021-12-15 PROCEDURE — 25010000002 PROPOFOL 10 MG/ML EMULSION: Performed by: NURSE ANESTHETIST, CERTIFIED REGISTERED

## 2021-12-15 PROCEDURE — 25010000002 CEFAZOLIN PER 500 MG

## 2021-12-15 PROCEDURE — C1769 GUIDE WIRE: HCPCS | Performed by: SURGERY

## 2021-12-15 PROCEDURE — 75630 X-RAY AORTA LEG ARTERIES: CPT

## 2021-12-15 PROCEDURE — C1894 INTRO/SHEATH, NON-LASER: HCPCS | Performed by: SURGERY

## 2021-12-15 PROCEDURE — C1887 CATHETER, GUIDING: HCPCS | Performed by: SURGERY

## 2021-12-15 PROCEDURE — 25010000002 MIDAZOLAM PER 1 MG: Performed by: ANESTHESIOLOGY

## 2021-12-15 PROCEDURE — 86900 BLOOD TYPING SEROLOGIC ABO: CPT | Performed by: SURGERY

## 2021-12-15 PROCEDURE — 25010000002 IOPAMIDOL 61 % SOLUTION: Performed by: SURGERY

## 2021-12-15 PROCEDURE — 86850 RBC ANTIBODY SCREEN: CPT | Performed by: SURGERY

## 2021-12-15 PROCEDURE — 25010000002 PROPOFOL 1000 MG/100ML EMULSION: Performed by: NURSE ANESTHETIST, CERTIFIED REGISTERED

## 2021-12-15 PROCEDURE — C1725 CATH, TRANSLUMIN NON-LASER: HCPCS | Performed by: SURGERY

## 2021-12-15 PROCEDURE — 25010000002 HEPARIN (PORCINE) PER 1000 UNITS: Performed by: SURGERY

## 2021-12-15 PROCEDURE — 86901 BLOOD TYPING SEROLOGIC RH(D): CPT | Performed by: SURGERY

## 2021-12-15 PROCEDURE — 75630 X-RAY AORTA LEG ARTERIES: CPT | Performed by: SURGERY

## 2021-12-15 PROCEDURE — 37220 PR REVASCULARIZATION ILIAC ARTERY ANGIOP 1ST VSL: CPT | Performed by: SURGERY

## 2021-12-15 PROCEDURE — 75625 CONTRAST EXAM ABDOMINL AORTA: CPT

## 2021-12-15 PROCEDURE — 76000 FLUOROSCOPY <1 HR PHYS/QHP: CPT

## 2021-12-15 PROCEDURE — 25010000002 FENTANYL CITRATE (PF) 100 MCG/2ML SOLUTION: Performed by: NURSE ANESTHETIST, CERTIFIED REGISTERED

## 2021-12-15 PROCEDURE — C1760 CLOSURE DEV, VASC: HCPCS | Performed by: SURGERY

## 2021-12-15 PROCEDURE — 25010000002 HEPARIN (PORCINE) PER 1000 UNITS: Performed by: NURSE ANESTHETIST, CERTIFIED REGISTERED

## 2021-12-15 RX ORDER — LIDOCAINE HYDROCHLORIDE 10 MG/ML
0.5 INJECTION, SOLUTION EPIDURAL; INFILTRATION; INTRACAUDAL; PERINEURAL ONCE AS NEEDED
Status: DISCONTINUED | OUTPATIENT
Start: 2021-12-15 | End: 2021-12-15 | Stop reason: HOSPADM

## 2021-12-15 RX ORDER — SODIUM CHLORIDE 0.9 % (FLUSH) 0.9 %
3-10 SYRINGE (ML) INJECTION AS NEEDED
Status: DISCONTINUED | OUTPATIENT
Start: 2021-12-15 | End: 2021-12-15 | Stop reason: HOSPADM

## 2021-12-15 RX ORDER — PROPOFOL 10 MG/ML
VIAL (ML) INTRAVENOUS AS NEEDED
Status: DISCONTINUED | OUTPATIENT
Start: 2021-12-15 | End: 2021-12-15 | Stop reason: SURG

## 2021-12-15 RX ORDER — OXYCODONE AND ACETAMINOPHEN 10; 325 MG/1; MG/1
1 TABLET ORAL ONCE AS NEEDED
Status: DISCONTINUED | OUTPATIENT
Start: 2021-12-15 | End: 2021-12-15 | Stop reason: HOSPADM

## 2021-12-15 RX ORDER — BUPIVACAINE HCL/0.9 % NACL/PF 0.1 %
2 PLASTIC BAG, INJECTION (ML) EPIDURAL ONCE
Status: COMPLETED | OUTPATIENT
Start: 2021-12-15 | End: 2021-12-15

## 2021-12-15 RX ORDER — ONDANSETRON 2 MG/ML
4 INJECTION INTRAMUSCULAR; INTRAVENOUS ONCE AS NEEDED
Status: DISCONTINUED | OUTPATIENT
Start: 2021-12-15 | End: 2021-12-15 | Stop reason: HOSPADM

## 2021-12-15 RX ORDER — HYDROCODONE BITARTRATE AND ACETAMINOPHEN 5; 325 MG/1; MG/1
1 TABLET ORAL ONCE AS NEEDED
Status: DISCONTINUED | OUTPATIENT
Start: 2021-12-15 | End: 2021-12-15 | Stop reason: HOSPADM

## 2021-12-15 RX ORDER — NALOXONE HCL 0.4 MG/ML
0.4 VIAL (ML) INJECTION AS NEEDED
Status: DISCONTINUED | OUTPATIENT
Start: 2021-12-15 | End: 2021-12-15 | Stop reason: HOSPADM

## 2021-12-15 RX ORDER — SODIUM CHLORIDE 0.9 % (FLUSH) 0.9 %
3 SYRINGE (ML) INJECTION EVERY 12 HOURS SCHEDULED
Status: DISCONTINUED | OUTPATIENT
Start: 2021-12-15 | End: 2021-12-15 | Stop reason: HOSPADM

## 2021-12-15 RX ORDER — SODIUM CHLORIDE 0.9 % (FLUSH) 0.9 %
3 SYRINGE (ML) INJECTION AS NEEDED
Status: DISCONTINUED | OUTPATIENT
Start: 2021-12-15 | End: 2021-12-15 | Stop reason: HOSPADM

## 2021-12-15 RX ORDER — OXYCODONE AND ACETAMINOPHEN 7.5; 325 MG/1; MG/1
2 TABLET ORAL EVERY 4 HOURS PRN
Status: DISCONTINUED | OUTPATIENT
Start: 2021-12-15 | End: 2021-12-15 | Stop reason: HOSPADM

## 2021-12-15 RX ORDER — BUPIVACAINE HYDROCHLORIDE 5 MG/ML
INJECTION, SOLUTION EPIDURAL; INTRACAUDAL AS NEEDED
Status: DISCONTINUED | OUTPATIENT
Start: 2021-12-15 | End: 2021-12-15 | Stop reason: HOSPADM

## 2021-12-15 RX ORDER — FLUMAZENIL 0.1 MG/ML
0.2 INJECTION INTRAVENOUS AS NEEDED
Status: DISCONTINUED | OUTPATIENT
Start: 2021-12-15 | End: 2021-12-15 | Stop reason: HOSPADM

## 2021-12-15 RX ORDER — SODIUM CHLORIDE, SODIUM LACTATE, POTASSIUM CHLORIDE, CALCIUM CHLORIDE 600; 310; 30; 20 MG/100ML; MG/100ML; MG/100ML; MG/100ML
1000 INJECTION, SOLUTION INTRAVENOUS CONTINUOUS
Status: DISCONTINUED | OUTPATIENT
Start: 2021-12-15 | End: 2021-12-15 | Stop reason: HOSPADM

## 2021-12-15 RX ORDER — FENTANYL CITRATE 50 UG/ML
25 INJECTION, SOLUTION INTRAMUSCULAR; INTRAVENOUS
Status: DISCONTINUED | OUTPATIENT
Start: 2021-12-15 | End: 2021-12-15 | Stop reason: HOSPADM

## 2021-12-15 RX ORDER — SODIUM CHLORIDE, SODIUM LACTATE, POTASSIUM CHLORIDE, CALCIUM CHLORIDE 600; 310; 30; 20 MG/100ML; MG/100ML; MG/100ML; MG/100ML
100 INJECTION, SOLUTION INTRAVENOUS CONTINUOUS
Status: DISCONTINUED | OUTPATIENT
Start: 2021-12-15 | End: 2021-12-15 | Stop reason: HOSPADM

## 2021-12-15 RX ORDER — LIDOCAINE HYDROCHLORIDE 20 MG/ML
INJECTION, SOLUTION EPIDURAL; INFILTRATION; INTRACAUDAL; PERINEURAL AS NEEDED
Status: DISCONTINUED | OUTPATIENT
Start: 2021-12-15 | End: 2021-12-15 | Stop reason: SURG

## 2021-12-15 RX ORDER — FENTANYL CITRATE 50 UG/ML
INJECTION, SOLUTION INTRAMUSCULAR; INTRAVENOUS AS NEEDED
Status: DISCONTINUED | OUTPATIENT
Start: 2021-12-15 | End: 2021-12-15 | Stop reason: SURG

## 2021-12-15 RX ORDER — HEPARIN SODIUM 1000 [USP'U]/ML
INJECTION, SOLUTION INTRAVENOUS; SUBCUTANEOUS AS NEEDED
Status: DISCONTINUED | OUTPATIENT
Start: 2021-12-15 | End: 2021-12-15 | Stop reason: SURG

## 2021-12-15 RX ORDER — LABETALOL HYDROCHLORIDE 5 MG/ML
5 INJECTION, SOLUTION INTRAVENOUS
Status: DISCONTINUED | OUTPATIENT
Start: 2021-12-15 | End: 2021-12-15 | Stop reason: HOSPADM

## 2021-12-15 RX ORDER — ACETAMINOPHEN 500 MG
1000 TABLET ORAL ONCE
Status: COMPLETED | OUTPATIENT
Start: 2021-12-15 | End: 2021-12-15

## 2021-12-15 RX ORDER — PROPOFOL 10 MG/ML
INJECTION, EMULSION INTRAVENOUS CONTINUOUS PRN
Status: DISCONTINUED | OUTPATIENT
Start: 2021-12-15 | End: 2021-12-15 | Stop reason: SURG

## 2021-12-15 RX ORDER — MIDAZOLAM HYDROCHLORIDE 1 MG/ML
1 INJECTION INTRAMUSCULAR; INTRAVENOUS
Status: COMPLETED | OUTPATIENT
Start: 2021-12-15 | End: 2021-12-15

## 2021-12-15 RX ADMIN — FENTANYL CITRATE 25 MCG: 50 INJECTION, SOLUTION INTRAMUSCULAR; INTRAVENOUS at 10:07

## 2021-12-15 RX ADMIN — ACETAMINOPHEN 1000 MG: 500 TABLET, FILM COATED ORAL at 09:21

## 2021-12-15 RX ADMIN — FENTANYL CITRATE 50 MCG: 50 INJECTION, SOLUTION INTRAMUSCULAR; INTRAVENOUS at 09:45

## 2021-12-15 RX ADMIN — PROPOFOL 150 MCG/KG/MIN: 10 INJECTION, EMULSION INTRAVENOUS at 09:46

## 2021-12-15 RX ADMIN — SODIUM CHLORIDE, POTASSIUM CHLORIDE, SODIUM LACTATE AND CALCIUM CHLORIDE 1000 ML: 600; 310; 30; 20 INJECTION, SOLUTION INTRAVENOUS at 08:34

## 2021-12-15 RX ADMIN — Medication 2 G: at 09:50

## 2021-12-15 RX ADMIN — LIDOCAINE HYDROCHLORIDE 80 MG: 20 INJECTION, SOLUTION EPIDURAL; INFILTRATION; INTRACAUDAL; PERINEURAL at 09:45

## 2021-12-15 RX ADMIN — HEPARIN SODIUM 3000 UNITS: 1000 INJECTION, SOLUTION INTRAVENOUS; SUBCUTANEOUS at 09:59

## 2021-12-15 RX ADMIN — HEPARIN SODIUM 1000 UNITS: 1000 INJECTION, SOLUTION INTRAVENOUS; SUBCUTANEOUS at 09:57

## 2021-12-15 RX ADMIN — MIDAZOLAM 1 MG: 1 INJECTION INTRAMUSCULAR; INTRAVENOUS at 09:23

## 2021-12-15 RX ADMIN — FENTANYL CITRATE 25 MCG: 50 INJECTION, SOLUTION INTRAMUSCULAR; INTRAVENOUS at 10:11

## 2021-12-15 RX ADMIN — MIDAZOLAM 1 MG: 1 INJECTION INTRAMUSCULAR; INTRAVENOUS at 09:33

## 2021-12-15 RX ADMIN — PROPOFOL 50 MG: 10 INJECTION, EMULSION INTRAVENOUS at 09:45

## 2022-01-06 ENCOUNTER — TELEPHONE (OUTPATIENT)
Dept: VASCULAR SURGERY | Facility: CLINIC | Age: 67
End: 2022-01-06

## 2022-01-07 ENCOUNTER — OFFICE VISIT (OUTPATIENT)
Dept: VASCULAR SURGERY | Facility: CLINIC | Age: 67
End: 2022-01-07

## 2022-01-07 VITALS
DIASTOLIC BLOOD PRESSURE: 78 MMHG | HEIGHT: 75 IN | WEIGHT: 240 LBS | BODY MASS INDEX: 29.84 KG/M2 | SYSTOLIC BLOOD PRESSURE: 124 MMHG

## 2022-01-07 DIAGNOSIS — Z86.79 S/P AAA (ABDOMINAL AORTIC ANEURYSM) REPAIR: ICD-10-CM

## 2022-01-07 DIAGNOSIS — I71.40 ABDOMINAL AORTIC ANEURYSM (AAA) WITHOUT RUPTURE: Primary | ICD-10-CM

## 2022-01-07 DIAGNOSIS — Z98.890 S/P AAA (ABDOMINAL AORTIC ANEURYSM) REPAIR: ICD-10-CM

## 2022-01-07 DIAGNOSIS — E78.5 HYPERLIPIDEMIA, UNSPECIFIED HYPERLIPIDEMIA TYPE: ICD-10-CM

## 2022-01-07 DIAGNOSIS — I10 PRIMARY HYPERTENSION: ICD-10-CM

## 2022-01-07 DIAGNOSIS — Z72.0 TOBACCO USE: ICD-10-CM

## 2022-01-07 PROCEDURE — 99024 POSTOP FOLLOW-UP VISIT: CPT | Performed by: NURSE PRACTITIONER

## 2022-01-26 ENCOUNTER — TRANSCRIBE ORDERS (OUTPATIENT)
Dept: LAB | Facility: HOSPITAL | Age: 67
End: 2022-01-26

## 2022-01-26 DIAGNOSIS — Z01.818 PREOP TESTING: Primary | ICD-10-CM

## 2022-01-28 ENCOUNTER — LAB (OUTPATIENT)
Dept: LAB | Facility: HOSPITAL | Age: 67
End: 2022-01-28

## 2022-01-28 LAB — SARS-COV-2 ORF1AB RESP QL NAA+PROBE: NOT DETECTED

## 2022-01-28 PROCEDURE — C9803 HOPD COVID-19 SPEC COLLECT: HCPCS | Performed by: PAIN MEDICINE

## 2022-01-28 PROCEDURE — U0004 COV-19 TEST NON-CDC HGH THRU: HCPCS | Performed by: PAIN MEDICINE

## 2022-02-22 ENCOUNTER — TRANSCRIBE ORDERS (OUTPATIENT)
Dept: LAB | Facility: HOSPITAL | Age: 67
End: 2022-02-22

## 2022-02-22 DIAGNOSIS — Z01.818 PREOP TESTING: Primary | ICD-10-CM

## 2022-02-25 ENCOUNTER — LAB (OUTPATIENT)
Dept: LAB | Facility: HOSPITAL | Age: 67
End: 2022-02-25

## 2022-02-25 LAB — SARS-COV-2 ORF1AB RESP QL NAA+PROBE: NOT DETECTED

## 2022-02-25 PROCEDURE — C9803 HOPD COVID-19 SPEC COLLECT: HCPCS | Performed by: PAIN MEDICINE

## 2022-02-25 PROCEDURE — U0004 COV-19 TEST NON-CDC HGH THRU: HCPCS | Performed by: PAIN MEDICINE

## 2022-03-14 RX ORDER — CLOPIDOGREL BISULFATE 75 MG/1
75 TABLET ORAL DAILY
Qty: 90 TABLET | Refills: 3 | Status: SHIPPED | OUTPATIENT
Start: 2022-03-14 | End: 2022-06-12

## 2022-03-29 ENCOUNTER — DOCUMENTATION (OUTPATIENT)
Dept: CT IMAGING | Facility: HOSPITAL | Age: 67
End: 2022-03-29

## 2022-07-11 ENCOUNTER — TELEPHONE (OUTPATIENT)
Dept: VASCULAR SURGERY | Facility: CLINIC | Age: 67
End: 2022-07-11

## 2022-07-12 ENCOUNTER — HOSPITAL ENCOUNTER (OUTPATIENT)
Dept: CT IMAGING | Facility: HOSPITAL | Age: 67
Discharge: HOME | End: 2022-07-12
Admitting: NURSE PRACTITIONER

## 2022-07-12 ENCOUNTER — OFFICE VISIT (OUTPATIENT)
Dept: VASCULAR SURGERY | Facility: CLINIC | Age: 67
End: 2022-07-12

## 2022-07-12 VITALS
HEIGHT: 78 IN | HEART RATE: 74 BPM | SYSTOLIC BLOOD PRESSURE: 104 MMHG | BODY MASS INDEX: 26.68 KG/M2 | WEIGHT: 230.6 LBS | OXYGEN SATURATION: 96 % | DIASTOLIC BLOOD PRESSURE: 68 MMHG

## 2022-07-12 DIAGNOSIS — Z98.890 S/P AAA (ABDOMINAL AORTIC ANEURYSM) REPAIR: ICD-10-CM

## 2022-07-12 DIAGNOSIS — I65.23 BILATERAL CAROTID ARTERY STENOSIS: ICD-10-CM

## 2022-07-12 DIAGNOSIS — I10 PRIMARY HYPERTENSION: ICD-10-CM

## 2022-07-12 DIAGNOSIS — Z86.79 S/P AAA (ABDOMINAL AORTIC ANEURYSM) REPAIR: ICD-10-CM

## 2022-07-12 DIAGNOSIS — I71.40 ABDOMINAL AORTIC ANEURYSM (AAA) WITHOUT RUPTURE: Primary | ICD-10-CM

## 2022-07-12 DIAGNOSIS — I71.40 ABDOMINAL AORTIC ANEURYSM (AAA) WITHOUT RUPTURE: ICD-10-CM

## 2022-07-12 DIAGNOSIS — Z72.0 TOBACCO USE: ICD-10-CM

## 2022-07-12 LAB — CREAT BLDA-MCNC: 1 MG/DL (ref 0.6–1.3)

## 2022-07-12 PROCEDURE — 74174 CTA ABD&PLVS W/CONTRAST: CPT

## 2022-07-12 PROCEDURE — 99214 OFFICE O/P EST MOD 30 MIN: CPT | Performed by: SURGERY

## 2022-07-12 PROCEDURE — 0 IOPAMIDOL PER 1 ML: Performed by: NURSE PRACTITIONER

## 2022-07-12 PROCEDURE — 82565 ASSAY OF CREATININE: CPT

## 2022-07-12 RX ORDER — CLOPIDOGREL BISULFATE 75 MG/1
75 TABLET ORAL DAILY
COMMUNITY
End: 2023-02-28 | Stop reason: ALTCHOICE

## 2022-07-12 RX ADMIN — IOPAMIDOL 100 ML: 755 INJECTION, SOLUTION INTRAVENOUS at 10:08

## 2023-01-16 ENCOUNTER — TELEPHONE (OUTPATIENT)
Dept: VASCULAR SURGERY | Facility: CLINIC | Age: 68
End: 2023-01-16
Payer: MEDICARE

## 2023-01-17 ENCOUNTER — APPOINTMENT (OUTPATIENT)
Dept: CT IMAGING | Facility: HOSPITAL | Age: 68
End: 2023-01-17
Payer: MEDICARE

## 2023-01-17 ENCOUNTER — HOSPITAL ENCOUNTER (OUTPATIENT)
Dept: ULTRASOUND IMAGING | Facility: HOSPITAL | Age: 68
Discharge: HOME | End: 2023-01-17
Admitting: SURGERY
Payer: MEDICARE

## 2023-01-17 ENCOUNTER — OFFICE VISIT (OUTPATIENT)
Dept: VASCULAR SURGERY | Facility: CLINIC | Age: 68
End: 2023-01-17
Payer: MEDICARE

## 2023-01-17 VITALS
HEART RATE: 65 BPM | HEIGHT: 78 IN | DIASTOLIC BLOOD PRESSURE: 78 MMHG | OXYGEN SATURATION: 97 % | SYSTOLIC BLOOD PRESSURE: 124 MMHG | BODY MASS INDEX: 28 KG/M2 | WEIGHT: 242 LBS

## 2023-01-17 DIAGNOSIS — I71.40 ABDOMINAL AORTIC ANEURYSM (AAA) GREATER THAN 5.5 CM IN DIAMETER IN MALE: ICD-10-CM

## 2023-01-17 DIAGNOSIS — I65.23 BILATERAL CAROTID ARTERY STENOSIS: ICD-10-CM

## 2023-01-17 DIAGNOSIS — I10 PRIMARY HYPERTENSION: ICD-10-CM

## 2023-01-17 DIAGNOSIS — I65.23 BILATERAL CAROTID ARTERY STENOSIS: Primary | ICD-10-CM

## 2023-01-17 DIAGNOSIS — E78.5 HYPERLIPIDEMIA, UNSPECIFIED HYPERLIPIDEMIA TYPE: ICD-10-CM

## 2023-01-17 PROBLEM — Z01.818 PREOP TESTING: Status: RESOLVED | Noted: 2021-12-09 | Resolved: 2023-01-17

## 2023-01-17 PROCEDURE — 93880 EXTRACRANIAL BILAT STUDY: CPT

## 2023-01-17 PROCEDURE — 93880 EXTRACRANIAL BILAT STUDY: CPT | Performed by: SURGERY

## 2023-01-17 PROCEDURE — 99214 OFFICE O/P EST MOD 30 MIN: CPT | Performed by: NURSE PRACTITIONER

## 2023-01-25 ENCOUNTER — TELEPHONE (OUTPATIENT)
Dept: VASCULAR SURGERY | Facility: CLINIC | Age: 68
End: 2023-01-25
Payer: MEDICARE

## 2023-01-26 ENCOUNTER — OFFICE VISIT (OUTPATIENT)
Dept: VASCULAR SURGERY | Facility: CLINIC | Age: 68
End: 2023-01-26
Payer: MEDICARE

## 2023-01-26 ENCOUNTER — HOSPITAL ENCOUNTER (OUTPATIENT)
Dept: CT IMAGING | Facility: HOSPITAL | Age: 68
Discharge: HOME | End: 2023-01-26
Admitting: SURGERY
Payer: MEDICARE

## 2023-01-26 VITALS
SYSTOLIC BLOOD PRESSURE: 122 MMHG | HEART RATE: 66 BPM | WEIGHT: 240 LBS | HEIGHT: 78 IN | BODY MASS INDEX: 27.77 KG/M2 | OXYGEN SATURATION: 97 % | DIASTOLIC BLOOD PRESSURE: 84 MMHG

## 2023-01-26 DIAGNOSIS — I71.40 ABDOMINAL AORTIC ANEURYSM (AAA) GREATER THAN 5.5 CM IN DIAMETER IN MALE: Primary | ICD-10-CM

## 2023-01-26 DIAGNOSIS — I71.40 ABDOMINAL AORTIC ANEURYSM (AAA) WITHOUT RUPTURE: ICD-10-CM

## 2023-01-26 DIAGNOSIS — I10 PRIMARY HYPERTENSION: ICD-10-CM

## 2023-01-26 DIAGNOSIS — I65.23 BILATERAL CAROTID ARTERY STENOSIS: ICD-10-CM

## 2023-01-26 DIAGNOSIS — Z72.0 TOBACCO USE: ICD-10-CM

## 2023-01-26 DIAGNOSIS — E78.5 HYPERLIPIDEMIA, UNSPECIFIED HYPERLIPIDEMIA TYPE: ICD-10-CM

## 2023-01-26 LAB — CREAT BLDA-MCNC: 1.1 MG/DL (ref 0.6–1.3)

## 2023-01-26 PROCEDURE — 99214 OFFICE O/P EST MOD 30 MIN: CPT | Performed by: NURSE PRACTITIONER

## 2023-01-26 PROCEDURE — 82565 ASSAY OF CREATININE: CPT

## 2023-01-26 PROCEDURE — 0 IOPAMIDOL PER 1 ML: Performed by: SURGERY

## 2023-01-26 PROCEDURE — 74174 CTA ABD&PLVS W/CONTRAST: CPT

## 2023-01-26 RX ADMIN — IOPAMIDOL 100 ML: 755 INJECTION, SOLUTION INTRAVENOUS at 11:29

## 2023-02-02 ENCOUNTER — OFFICE VISIT (OUTPATIENT)
Dept: CARDIOLOGY | Facility: CLINIC | Age: 68
End: 2023-02-02
Payer: MEDICARE

## 2023-02-02 VITALS
BODY MASS INDEX: 28.46 KG/M2 | DIASTOLIC BLOOD PRESSURE: 82 MMHG | HEART RATE: 67 BPM | WEIGHT: 246 LBS | HEIGHT: 78 IN | OXYGEN SATURATION: 99 % | SYSTOLIC BLOOD PRESSURE: 132 MMHG

## 2023-02-02 DIAGNOSIS — Z98.890 S/P AAA (ABDOMINAL AORTIC ANEURYSM) REPAIR: ICD-10-CM

## 2023-02-02 DIAGNOSIS — Z86.79 S/P AAA (ABDOMINAL AORTIC ANEURYSM) REPAIR: ICD-10-CM

## 2023-02-02 DIAGNOSIS — Z72.0 TOBACCO USE: ICD-10-CM

## 2023-02-02 DIAGNOSIS — I49.9 CARDIAC ARRHYTHMIA, UNSPECIFIED CARDIAC ARRHYTHMIA TYPE: Primary | ICD-10-CM

## 2023-02-02 DIAGNOSIS — E78.5 HYPERLIPIDEMIA, UNSPECIFIED HYPERLIPIDEMIA TYPE: ICD-10-CM

## 2023-02-02 DIAGNOSIS — R06.09 DOE (DYSPNEA ON EXERTION): ICD-10-CM

## 2023-02-02 DIAGNOSIS — I10 PRIMARY HYPERTENSION: ICD-10-CM

## 2023-02-02 PROCEDURE — 99204 OFFICE O/P NEW MOD 45 MIN: CPT | Performed by: INTERNAL MEDICINE

## 2023-02-02 PROCEDURE — 93000 ELECTROCARDIOGRAM COMPLETE: CPT | Performed by: INTERNAL MEDICINE

## 2023-02-21 ENCOUNTER — HOSPITAL ENCOUNTER (OUTPATIENT)
Dept: CARDIOLOGY | Facility: HOSPITAL | Age: 68
Discharge: HOME | End: 2023-02-21
Payer: MEDICARE

## 2023-02-21 VITALS
WEIGHT: 246.91 LBS | HEART RATE: 64 BPM | HEIGHT: 78 IN | DIASTOLIC BLOOD PRESSURE: 81 MMHG | BODY MASS INDEX: 28.57 KG/M2 | SYSTOLIC BLOOD PRESSURE: 141 MMHG

## 2023-02-21 DIAGNOSIS — R06.09 DOE (DYSPNEA ON EXERTION): ICD-10-CM

## 2023-02-21 PROCEDURE — 78452 HT MUSCLE IMAGE SPECT MULT: CPT | Performed by: INTERNAL MEDICINE

## 2023-02-21 PROCEDURE — A9502 TC99M TETROFOSMIN: HCPCS | Performed by: INTERNAL MEDICINE

## 2023-02-21 PROCEDURE — 78452 HT MUSCLE IMAGE SPECT MULT: CPT

## 2023-02-21 PROCEDURE — 93018 CV STRESS TEST I&R ONLY: CPT | Performed by: INTERNAL MEDICINE

## 2023-02-21 PROCEDURE — 93017 CV STRESS TEST TRACING ONLY: CPT

## 2023-02-21 PROCEDURE — 0 TECHNETIUM TETROFOSMIN KIT: Performed by: INTERNAL MEDICINE

## 2023-02-21 PROCEDURE — 25010000002 REGADENOSON 0.4 MG/5ML SOLUTION: Performed by: INTERNAL MEDICINE

## 2023-02-21 RX ADMIN — TETROFOSMIN 1 DOSE: 1.38 INJECTION, POWDER, LYOPHILIZED, FOR SOLUTION INTRAVENOUS at 10:30

## 2023-02-21 RX ADMIN — REGADENOSON 0.4 MG: 0.08 INJECTION, SOLUTION INTRAVENOUS at 10:27

## 2023-02-21 RX ADMIN — TETROFOSMIN 1 DOSE: 1.38 INJECTION, POWDER, LYOPHILIZED, FOR SOLUTION INTRAVENOUS at 09:33

## 2023-02-22 LAB
BH CV NUCLEAR PRIOR STUDY: 3
BH CV REST NUCLEAR ISOTOPE DOSE: 11.1 MCI
BH CV STRESS BP STAGE 1: NORMAL
BH CV STRESS COMMENTS STAGE 1: NORMAL
BH CV STRESS DOSE REGADENOSON STAGE 1: 0.4
BH CV STRESS DURATION MIN STAGE 1: 0
BH CV STRESS DURATION SEC STAGE 1: 10
BH CV STRESS HR STAGE 1: 82
BH CV STRESS NUCLEAR ISOTOPE DOSE: 34.2 MCI
BH CV STRESS PROTOCOL 1: NORMAL
BH CV STRESS RECOVERY BP: NORMAL MMHG
BH CV STRESS RECOVERY HR: 61 BPM
BH CV STRESS STAGE 1: 1
MAXIMAL PREDICTED HEART RATE: 155 BPM
PERCENT MAX PREDICTED HR: 52.9 %
STRESS BASELINE BP: NORMAL MMHG
STRESS BASELINE HR: 66 BPM
STRESS PERCENT HR: 62 %
STRESS POST EXERCISE DUR MIN: 0 MIN
STRESS POST EXERCISE DUR SEC: 10 SEC
STRESS POST PEAK BP: NORMAL MMHG
STRESS POST PEAK HR: 82 BPM
STRESS TARGET HR: 132 BPM

## 2023-02-24 ENCOUNTER — HOSPITAL ENCOUNTER (OUTPATIENT)
Dept: CARDIOLOGY | Facility: HOSPITAL | Age: 68
Discharge: HOME | End: 2023-02-24
Admitting: INTERNAL MEDICINE
Payer: MEDICARE

## 2023-02-24 VITALS
BODY MASS INDEX: 28.46 KG/M2 | HEIGHT: 78 IN | SYSTOLIC BLOOD PRESSURE: 157 MMHG | WEIGHT: 246 LBS | DIASTOLIC BLOOD PRESSURE: 89 MMHG

## 2023-02-24 DIAGNOSIS — R06.09 DOE (DYSPNEA ON EXERTION): ICD-10-CM

## 2023-02-24 LAB
BH CV ECHO MEAS - AO MAX PG: 6.4 MMHG
BH CV ECHO MEAS - AO MEAN PG: 4 MMHG
BH CV ECHO MEAS - AO ROOT DIAM: 3.7 CM
BH CV ECHO MEAS - AO V2 MAX: 126 CM/SEC
BH CV ECHO MEAS - AO V2 VTI: 29.4 CM
BH CV ECHO MEAS - AVA(I,D): 4.8 CM2
BH CV ECHO MEAS - EDV(CUBED): 136.6 ML
BH CV ECHO MEAS - EDV(MOD-SP2): 122 ML
BH CV ECHO MEAS - EDV(MOD-SP4): 105 ML
BH CV ECHO MEAS - EF(MOD-BP): 56.7 %
BH CV ECHO MEAS - EF(MOD-SP2): 60.7 %
BH CV ECHO MEAS - EF(MOD-SP4): 54.8 %
BH CV ECHO MEAS - ESV(CUBED): 30.1 ML
BH CV ECHO MEAS - ESV(MOD-SP2): 48 ML
BH CV ECHO MEAS - ESV(MOD-SP4): 47.5 ML
BH CV ECHO MEAS - FS: 39.6 %
BH CV ECHO MEAS - IVS/LVPW: 1.02 CM
BH CV ECHO MEAS - IVSD: 1.13 CM
BH CV ECHO MEAS - LA DIMENSION: 3.6 CM
BH CV ECHO MEAS - LAT PEAK E' VEL: 10.9 CM/SEC
BH CV ECHO MEAS - LV DIASTOLIC VOL/BSA (35-75): 41.8 CM2
BH CV ECHO MEAS - LV MASS(C)D: 222.7 GRAMS
BH CV ECHO MEAS - LV MAX PG: 5.8 MMHG
BH CV ECHO MEAS - LV MEAN PG: 3 MMHG
BH CV ECHO MEAS - LV SYSTOLIC VOL/BSA (12-30): 18.9 CM2
BH CV ECHO MEAS - LV V1 MAX: 120 CM/SEC
BH CV ECHO MEAS - LV V1 VTI: 28.9 CM
BH CV ECHO MEAS - LVIDD: 5.2 CM
BH CV ECHO MEAS - LVIDS: 3.1 CM
BH CV ECHO MEAS - LVOT AREA: 4.9 CM2
BH CV ECHO MEAS - LVOT DIAM: 2.5 CM
BH CV ECHO MEAS - LVPWD: 1.11 CM
BH CV ECHO MEAS - MED PEAK E' VEL: 8.5 CM/SEC
BH CV ECHO MEAS - MV A MAX VEL: 41.7 CM/SEC
BH CV ECHO MEAS - MV DEC TIME: 0.31 MSEC
BH CV ECHO MEAS - MV E MAX VEL: 64 CM/SEC
BH CV ECHO MEAS - MV E/A: 1.53
BH CV ECHO MEAS - PA V2 MAX: 54.3 CM/SEC
BH CV ECHO MEAS - PI END-D VEL: 110 CM/SEC
BH CV ECHO MEAS - RAP SYSTOLE: 5 MMHG
BH CV ECHO MEAS - RVSP: 32.2 MMHG
BH CV ECHO MEAS - SI(MOD-SP2): 29.5 ML/M2
BH CV ECHO MEAS - SI(MOD-SP4): 22.9 ML/M2
BH CV ECHO MEAS - SV(LVOT): 141.9 ML
BH CV ECHO MEAS - SV(MOD-SP2): 74 ML
BH CV ECHO MEAS - SV(MOD-SP4): 57.5 ML
BH CV ECHO MEAS - TR MAX PG: 27.2 MMHG
BH CV ECHO MEAS - TR MAX VEL: 261 CM/SEC
BH CV ECHO MEASUREMENTS AVERAGE E/E' RATIO: 6.6
LEFT ATRIUM VOLUME INDEX: 25.6 ML/M2
LEFT ATRIUM VOLUME: 64.2 ML
MAXIMAL PREDICTED HEART RATE: 155 BPM
STRESS TARGET HR: 132 BPM

## 2023-02-24 PROCEDURE — 93306 TTE W/DOPPLER COMPLETE: CPT | Performed by: INTERNAL MEDICINE

## 2023-02-24 PROCEDURE — 93306 TTE W/DOPPLER COMPLETE: CPT

## 2023-03-24 ENCOUNTER — OFFICE VISIT (OUTPATIENT)
Dept: CARDIOLOGY | Facility: CLINIC | Age: 68
End: 2023-03-24
Payer: MEDICARE

## 2023-03-24 VITALS
OXYGEN SATURATION: 99 % | BODY MASS INDEX: 27.88 KG/M2 | WEIGHT: 241 LBS | SYSTOLIC BLOOD PRESSURE: 120 MMHG | HEART RATE: 73 BPM | DIASTOLIC BLOOD PRESSURE: 83 MMHG | HEIGHT: 78 IN

## 2023-03-24 DIAGNOSIS — Z72.0 TOBACCO USE: ICD-10-CM

## 2023-03-24 DIAGNOSIS — I48.0 PAF (PAROXYSMAL ATRIAL FIBRILLATION): Primary | ICD-10-CM

## 2023-03-24 DIAGNOSIS — I10 PRIMARY HYPERTENSION: ICD-10-CM

## 2023-03-24 PROCEDURE — 99214 OFFICE O/P EST MOD 30 MIN: CPT | Performed by: INTERNAL MEDICINE

## 2023-03-24 PROCEDURE — 1159F MED LIST DOCD IN RCRD: CPT | Performed by: INTERNAL MEDICINE

## 2023-03-24 PROCEDURE — 3079F DIAST BP 80-89 MM HG: CPT | Performed by: INTERNAL MEDICINE

## 2023-03-24 PROCEDURE — 1160F RVW MEDS BY RX/DR IN RCRD: CPT | Performed by: INTERNAL MEDICINE

## 2023-03-24 PROCEDURE — 3074F SYST BP LT 130 MM HG: CPT | Performed by: INTERNAL MEDICINE

## 2023-03-24 PROCEDURE — 93000 ELECTROCARDIOGRAM COMPLETE: CPT | Performed by: INTERNAL MEDICINE

## 2023-03-27 ENCOUNTER — PREP FOR SURGERY (OUTPATIENT)
Dept: OTHER | Facility: HOSPITAL | Age: 68
End: 2023-03-27
Payer: MEDICARE

## 2023-03-27 ENCOUNTER — TELEPHONE (OUTPATIENT)
Dept: CARDIOLOGY | Facility: CLINIC | Age: 68
End: 2023-03-27
Payer: MEDICARE

## 2023-03-27 DIAGNOSIS — I48.0 PAF (PAROXYSMAL ATRIAL FIBRILLATION): Primary | ICD-10-CM

## 2023-03-27 RX ORDER — SODIUM CHLORIDE 0.9 % (FLUSH) 0.9 %
10 SYRINGE (ML) INJECTION EVERY 12 HOURS SCHEDULED
OUTPATIENT
Start: 2023-03-27

## 2023-03-27 RX ORDER — SODIUM CHLORIDE 0.9 % (FLUSH) 0.9 %
10 SYRINGE (ML) INJECTION AS NEEDED
OUTPATIENT
Start: 2023-03-27

## 2023-03-27 RX ORDER — SODIUM CHLORIDE 9 MG/ML
20 INJECTION, SOLUTION INTRAVENOUS CONTINUOUS
OUTPATIENT
Start: 2023-03-27

## 2023-04-11 ENCOUNTER — ANESTHESIA EVENT (OUTPATIENT)
Dept: CARDIOLOGY | Facility: HOSPITAL | Age: 68
End: 2023-04-11
Payer: MEDICARE

## 2023-04-12 ENCOUNTER — HOSPITAL ENCOUNTER (OUTPATIENT)
Dept: CARDIOLOGY | Facility: HOSPITAL | Age: 68
Discharge: HOME | End: 2023-04-12
Payer: MEDICARE

## 2023-04-12 ENCOUNTER — ANESTHESIA (OUTPATIENT)
Dept: CARDIOLOGY | Facility: HOSPITAL | Age: 68
End: 2023-04-12
Payer: MEDICARE

## 2023-04-12 VITALS
HEART RATE: 55 BPM | OXYGEN SATURATION: 97 % | TEMPERATURE: 97.4 F | RESPIRATION RATE: 18 BRPM | HEIGHT: 78 IN | WEIGHT: 238 LBS | SYSTOLIC BLOOD PRESSURE: 136 MMHG | DIASTOLIC BLOOD PRESSURE: 83 MMHG | BODY MASS INDEX: 27.54 KG/M2

## 2023-04-12 DIAGNOSIS — I48.0 PAF (PAROXYSMAL ATRIAL FIBRILLATION): ICD-10-CM

## 2023-04-12 PROCEDURE — 25010000002 PROPOFOL 1000 MG/100ML EMULSION: Performed by: NURSE ANESTHETIST, CERTIFIED REGISTERED

## 2023-04-12 PROCEDURE — 93312 ECHO TRANSESOPHAGEAL: CPT | Performed by: INTERNAL MEDICINE

## 2023-04-12 PROCEDURE — 93312 ECHO TRANSESOPHAGEAL: CPT

## 2023-04-12 PROCEDURE — 93325 DOPPLER ECHO COLOR FLOW MAPG: CPT | Performed by: INTERNAL MEDICINE

## 2023-04-12 PROCEDURE — 93321 DOPPLER ECHO F-UP/LMTD STD: CPT

## 2023-04-12 PROCEDURE — 93325 DOPPLER ECHO COLOR FLOW MAPG: CPT

## 2023-04-12 PROCEDURE — 93321 DOPPLER ECHO F-UP/LMTD STD: CPT | Performed by: INTERNAL MEDICINE

## 2023-04-12 RX ORDER — LIDOCAINE HYDROCHLORIDE 20 MG/ML
INJECTION, SOLUTION EPIDURAL; INFILTRATION; INTRACAUDAL; PERINEURAL AS NEEDED
Status: DISCONTINUED | OUTPATIENT
Start: 2023-04-12 | End: 2023-04-12 | Stop reason: SURG

## 2023-04-12 RX ORDER — SODIUM CHLORIDE 0.9 % (FLUSH) 0.9 %
10 SYRINGE (ML) INJECTION AS NEEDED
Status: DISCONTINUED | OUTPATIENT
Start: 2023-04-12 | End: 2023-04-13 | Stop reason: HOSPADM

## 2023-04-12 RX ORDER — PROPOFOL 10 MG/ML
INJECTION, EMULSION INTRAVENOUS AS NEEDED
Status: DISCONTINUED | OUTPATIENT
Start: 2023-04-12 | End: 2023-04-12 | Stop reason: SURG

## 2023-04-12 RX ORDER — SODIUM CHLORIDE 0.9 % (FLUSH) 0.9 %
10 SYRINGE (ML) INJECTION EVERY 12 HOURS SCHEDULED
Status: DISCONTINUED | OUTPATIENT
Start: 2023-04-12 | End: 2023-04-13 | Stop reason: HOSPADM

## 2023-04-12 RX ORDER — SODIUM CHLORIDE 9 MG/ML
20 INJECTION, SOLUTION INTRAVENOUS CONTINUOUS
Status: DISCONTINUED | OUTPATIENT
Start: 2023-04-12 | End: 2023-04-13 | Stop reason: HOSPADM

## 2023-04-12 RX ADMIN — LIDOCAINE HYDROCHLORIDE 100 MG: 20 INJECTION, SOLUTION EPIDURAL; INFILTRATION; INTRACAUDAL; PERINEURAL at 10:38

## 2023-04-12 RX ADMIN — PROPOFOL 300 MG: 10 INJECTION, EMULSION INTRAVENOUS at 10:38

## 2023-04-13 ENCOUNTER — TELEPHONE (OUTPATIENT)
Dept: CARDIOLOGY | Facility: CLINIC | Age: 68
End: 2023-04-13
Payer: MEDICARE

## 2023-04-13 LAB
BH CV ECHO MEAS - LAA 0 DEGREE LENGTH: 20.7 CM
BH CV ECHO MEAS - LAA 0 DEGREE WIDTH: 17.5 CM
BH CV ECHO MEAS - LAA 135 DEGREE LENGTH: 15.1 CM
BH CV ECHO MEAS - LAA 135 DEGREE WIDTH: 15.4 CM
BH CV ECHO MEAS - LAA 45 DEGREE LENGTH: 11.3 CM
BH CV ECHO MEAS - LAA 45 DEGREE WIDTH: 16.6 CM
BH CV ECHO MEAS - LAA 90 DEGREE LENGTH: 16.4 CM
BH CV ECHO MEAS - LAA 90 DEGREE WIDTH: 19.3 CM
MAXIMAL PREDICTED HEART RATE: 155 BPM
STRESS TARGET HR: 132 BPM

## 2023-04-17 ENCOUNTER — OFFICE VISIT (OUTPATIENT)
Dept: CARDIOLOGY | Facility: CLINIC | Age: 68
End: 2023-04-17
Payer: MEDICARE

## 2023-04-17 VITALS
WEIGHT: 240 LBS | DIASTOLIC BLOOD PRESSURE: 80 MMHG | HEIGHT: 78 IN | OXYGEN SATURATION: 98 % | SYSTOLIC BLOOD PRESSURE: 128 MMHG | BODY MASS INDEX: 27.77 KG/M2 | HEART RATE: 57 BPM

## 2023-04-17 DIAGNOSIS — I48.0 PAF (PAROXYSMAL ATRIAL FIBRILLATION): Primary | ICD-10-CM

## 2023-04-17 DIAGNOSIS — Z87.898 HISTORY OF EPISTAXIS: ICD-10-CM

## 2023-04-17 DIAGNOSIS — I10 PRIMARY HYPERTENSION: ICD-10-CM

## 2023-04-17 PROCEDURE — 99214 OFFICE O/P EST MOD 30 MIN: CPT | Performed by: INTERNAL MEDICINE

## 2023-04-17 PROCEDURE — 3079F DIAST BP 80-89 MM HG: CPT | Performed by: INTERNAL MEDICINE

## 2023-04-17 PROCEDURE — 3074F SYST BP LT 130 MM HG: CPT | Performed by: INTERNAL MEDICINE

## 2023-04-17 PROCEDURE — 1159F MED LIST DOCD IN RCRD: CPT | Performed by: INTERNAL MEDICINE

## 2023-04-17 PROCEDURE — 1160F RVW MEDS BY RX/DR IN RCRD: CPT | Performed by: INTERNAL MEDICINE

## 2023-04-26 ENCOUNTER — TELEPHONE (OUTPATIENT)
Dept: CARDIOLOGY | Facility: CLINIC | Age: 68
End: 2023-04-26
Payer: MEDICARE

## 2023-04-27 ENCOUNTER — TELEPHONE (OUTPATIENT)
Dept: CARDIOLOGY | Facility: CLINIC | Age: 68
End: 2023-04-27
Payer: MEDICARE

## 2023-04-27 ENCOUNTER — PREP FOR SURGERY (OUTPATIENT)
Dept: OTHER | Facility: HOSPITAL | Age: 68
End: 2023-04-27
Payer: MEDICARE

## 2023-04-27 DIAGNOSIS — I48.0 PAF (PAROXYSMAL ATRIAL FIBRILLATION): Primary | ICD-10-CM

## 2023-04-27 RX ORDER — ASPIRIN 81 MG/1
324 TABLET, CHEWABLE ORAL ONCE
OUTPATIENT
Start: 2023-04-27 | End: 2023-04-27

## 2023-04-27 RX ORDER — SODIUM CHLORIDE 0.9 % (FLUSH) 0.9 %
10 SYRINGE (ML) INJECTION EVERY 12 HOURS SCHEDULED
OUTPATIENT
Start: 2023-04-27

## 2023-04-27 RX ORDER — SODIUM CHLORIDE, SODIUM LACTATE, POTASSIUM CHLORIDE, CALCIUM CHLORIDE 600; 310; 30; 20 MG/100ML; MG/100ML; MG/100ML; MG/100ML
1 INJECTION, SOLUTION INTRAVENOUS CONTINUOUS
OUTPATIENT
Start: 2023-04-27

## 2023-04-27 RX ORDER — SODIUM CHLORIDE 0.9 % (FLUSH) 0.9 %
10 SYRINGE (ML) INJECTION AS NEEDED
OUTPATIENT
Start: 2023-04-27

## 2023-05-08 ENCOUNTER — HOSPITAL ENCOUNTER (OUTPATIENT)
Dept: GENERAL RADIOLOGY | Facility: HOSPITAL | Age: 68
Discharge: HOME | DRG: 274 | End: 2023-05-08
Payer: MEDICARE

## 2023-05-08 ENCOUNTER — HOSPITAL ENCOUNTER (OUTPATIENT)
Dept: CARDIOLOGY | Facility: HOSPITAL | Age: 68
Setting detail: HOSPITAL OUTPATIENT SURGERY
Discharge: HOME | DRG: 274 | End: 2023-05-08
Payer: MEDICARE

## 2023-05-08 DIAGNOSIS — I48.0 PAF (PAROXYSMAL ATRIAL FIBRILLATION): ICD-10-CM

## 2023-05-08 LAB
ABO GROUP BLD: NORMAL
ALBUMIN SERPL-MCNC: 4.4 G/DL (ref 3.5–5.2)
ALBUMIN/GLOB SERPL: 1.3 G/DL
ALP SERPL-CCNC: 93 U/L (ref 39–117)
ALT SERPL W P-5'-P-CCNC: 28 U/L (ref 1–41)
ANION GAP SERPL CALCULATED.3IONS-SCNC: 14 MMOL/L (ref 5–15)
AST SERPL-CCNC: 50 U/L (ref 1–40)
BASOPHILS # BLD AUTO: 0.06 10*3/MM3 (ref 0–0.2)
BASOPHILS NFR BLD AUTO: 0.6 % (ref 0–1.5)
BILIRUB SERPL-MCNC: 0.9 MG/DL (ref 0–1.2)
BLD GP AB SCN SERPL QL: NEGATIVE
BUN SERPL-MCNC: 13 MG/DL (ref 8–23)
BUN/CREAT SERPL: 10.1 (ref 7–25)
CALCIUM SPEC-SCNC: 9.7 MG/DL (ref 8.6–10.5)
CHLORIDE SERPL-SCNC: 101 MMOL/L (ref 98–107)
CO2 SERPL-SCNC: 21 MMOL/L (ref 22–29)
CREAT SERPL-MCNC: 1.29 MG/DL (ref 0.76–1.27)
DEPRECATED RDW RBC AUTO: 49.1 FL (ref 37–54)
EGFRCR SERPLBLD CKD-EPI 2021: 61.5 ML/MIN/1.73
EOSINOPHIL # BLD AUTO: 0.24 10*3/MM3 (ref 0–0.4)
EOSINOPHIL NFR BLD AUTO: 2.2 % (ref 0.3–6.2)
ERYTHROCYTE [DISTWIDTH] IN BLOOD BY AUTOMATED COUNT: 13.2 % (ref 12.3–15.4)
GLOBULIN UR ELPH-MCNC: 3.4 GM/DL
GLUCOSE SERPL-MCNC: 121 MG/DL (ref 65–99)
HCT VFR BLD AUTO: 49.8 % (ref 37.5–51)
HGB BLD-MCNC: 16.1 G/DL (ref 13–17.7)
IMM GRANULOCYTES # BLD AUTO: 0.06 10*3/MM3 (ref 0–0.05)
IMM GRANULOCYTES NFR BLD AUTO: 0.6 % (ref 0–0.5)
INR PPP: 1 (ref 0.91–1.09)
LYMPHOCYTES # BLD AUTO: 2.36 10*3/MM3 (ref 0.7–3.1)
LYMPHOCYTES NFR BLD AUTO: 22 % (ref 19.6–45.3)
MCH RBC QN AUTO: 32.1 PG (ref 26.6–33)
MCHC RBC AUTO-ENTMCNC: 32.3 G/DL (ref 31.5–35.7)
MCV RBC AUTO: 99.4 FL (ref 79–97)
MONOCYTES # BLD AUTO: 1.04 10*3/MM3 (ref 0.1–0.9)
MONOCYTES NFR BLD AUTO: 9.7 % (ref 5–12)
NEUTROPHILS NFR BLD AUTO: 6.95 10*3/MM3 (ref 1.7–7)
NEUTROPHILS NFR BLD AUTO: 64.9 % (ref 42.7–76)
NRBC BLD AUTO-RTO: 0 /100 WBC (ref 0–0.2)
PLATELET # BLD AUTO: 253 10*3/MM3 (ref 140–450)
PMV BLD AUTO: 9.9 FL (ref 6–12)
POTASSIUM SERPL-SCNC: 4.2 MMOL/L (ref 3.5–5.2)
PROT SERPL-MCNC: 7.8 G/DL (ref 6–8.5)
PROTHROMBIN TIME: 13.3 SECONDS (ref 11.8–14.8)
RBC # BLD AUTO: 5.01 10*6/MM3 (ref 4.14–5.8)
RH BLD: POSITIVE
SODIUM SERPL-SCNC: 136 MMOL/L (ref 136–145)
T&S EXPIRATION DATE: NORMAL
WBC NRBC COR # BLD: 10.71 10*3/MM3 (ref 3.4–10.8)

## 2023-05-08 PROCEDURE — 80053 COMPREHEN METABOLIC PANEL: CPT | Performed by: INTERNAL MEDICINE

## 2023-05-08 PROCEDURE — 86901 BLOOD TYPING SEROLOGIC RH(D): CPT | Performed by: INTERNAL MEDICINE

## 2023-05-08 PROCEDURE — 93005 ELECTROCARDIOGRAM TRACING: CPT | Performed by: INTERNAL MEDICINE

## 2023-05-08 PROCEDURE — 85025 COMPLETE CBC W/AUTO DIFF WBC: CPT | Performed by: INTERNAL MEDICINE

## 2023-05-08 PROCEDURE — 86900 BLOOD TYPING SEROLOGIC ABO: CPT | Performed by: INTERNAL MEDICINE

## 2023-05-08 PROCEDURE — 85610 PROTHROMBIN TIME: CPT | Performed by: INTERNAL MEDICINE

## 2023-05-08 PROCEDURE — 71046 X-RAY EXAM CHEST 2 VIEWS: CPT

## 2023-05-08 PROCEDURE — 86850 RBC ANTIBODY SCREEN: CPT | Performed by: INTERNAL MEDICINE

## 2023-05-09 ENCOUNTER — ANESTHESIA EVENT (OUTPATIENT)
Dept: CARDIOLOGY | Facility: HOSPITAL | Age: 68
DRG: 274 | End: 2023-05-09
Payer: MEDICARE

## 2023-05-09 ENCOUNTER — APPOINTMENT (OUTPATIENT)
Dept: CARDIOLOGY | Facility: HOSPITAL | Age: 68
DRG: 274 | End: 2023-05-09
Payer: MEDICARE

## 2023-05-09 ENCOUNTER — HOSPITAL ENCOUNTER (INPATIENT)
Facility: HOSPITAL | Age: 68
LOS: 1 days | Discharge: HOME | DRG: 274 | End: 2023-05-09
Attending: INTERNAL MEDICINE | Admitting: INTERNAL MEDICINE
Payer: MEDICARE

## 2023-05-09 ENCOUNTER — ANESTHESIA (OUTPATIENT)
Dept: CARDIOLOGY | Facility: HOSPITAL | Age: 68
DRG: 274 | End: 2023-05-09
Payer: MEDICARE

## 2023-05-09 VITALS
RESPIRATION RATE: 17 BRPM | BODY MASS INDEX: 27.07 KG/M2 | WEIGHT: 234 LBS | DIASTOLIC BLOOD PRESSURE: 60 MMHG | OXYGEN SATURATION: 99 % | TEMPERATURE: 97.8 F | HEART RATE: 60 BPM | SYSTOLIC BLOOD PRESSURE: 94 MMHG | HEIGHT: 78 IN

## 2023-05-09 DIAGNOSIS — I48.0 PAROXYSMAL A-FIB: ICD-10-CM

## 2023-05-09 DIAGNOSIS — Z95.818 PRESENCE OF WATCHMAN LEFT ATRIAL APPENDAGE CLOSURE DEVICE: Primary | ICD-10-CM

## 2023-05-09 DIAGNOSIS — I48.0 PAF (PAROXYSMAL ATRIAL FIBRILLATION): ICD-10-CM

## 2023-05-09 PROBLEM — R04.0 EPISTAXIS: Status: ACTIVE | Noted: 2023-05-09

## 2023-05-09 PROBLEM — Z79.01 CHRONIC ANTICOAGULATION: Status: ACTIVE | Noted: 2023-05-09

## 2023-05-09 LAB
ANION GAP SERPL CALCULATED.3IONS-SCNC: 12 MMOL/L (ref 5–15)
BUN SERPL-MCNC: 18 MG/DL (ref 8–23)
BUN/CREAT SERPL: 13.8 (ref 7–25)
CALCIUM SPEC-SCNC: 9.1 MG/DL (ref 8.6–10.5)
CHLORIDE SERPL-SCNC: 102 MMOL/L (ref 98–107)
CO2 SERPL-SCNC: 22 MMOL/L (ref 22–29)
CREAT SERPL-MCNC: 1.3 MG/DL (ref 0.76–1.27)
DEPRECATED RDW RBC AUTO: 50.4 FL (ref 37–54)
EGFRCR SERPLBLD CKD-EPI 2021: 61 ML/MIN/1.73
ERYTHROCYTE [DISTWIDTH] IN BLOOD BY AUTOMATED COUNT: 13.3 % (ref 12.3–15.4)
GLUCOSE SERPL-MCNC: 148 MG/DL (ref 65–99)
HCT VFR BLD AUTO: 48.2 % (ref 37.5–51)
HGB BLD-MCNC: 15.5 G/DL (ref 13–17.7)
MAXIMAL PREDICTED HEART RATE: 155 BPM
MCH RBC QN AUTO: 32.6 PG (ref 26.6–33)
MCHC RBC AUTO-ENTMCNC: 32.2 G/DL (ref 31.5–35.7)
MCV RBC AUTO: 101.5 FL (ref 79–97)
PLATELET # BLD AUTO: 216 10*3/MM3 (ref 140–450)
PMV BLD AUTO: 10.4 FL (ref 6–12)
POTASSIUM SERPL-SCNC: 4.3 MMOL/L (ref 3.5–5.2)
RBC # BLD AUTO: 4.75 10*6/MM3 (ref 4.14–5.8)
SODIUM SERPL-SCNC: 136 MMOL/L (ref 136–145)
STRESS TARGET HR: 132 BPM
WBC NRBC COR # BLD: 7.12 10*3/MM3 (ref 3.4–10.8)

## 2023-05-09 PROCEDURE — C1889 IMPLANT/INSERT DEVICE, NOC: HCPCS | Performed by: INTERNAL MEDICINE

## 2023-05-09 PROCEDURE — 25010000002 CEFAZOLIN PER 500 MG: Performed by: INTERNAL MEDICINE

## 2023-05-09 PROCEDURE — 85027 COMPLETE CBC AUTOMATED: CPT | Performed by: INTERNAL MEDICINE

## 2023-05-09 PROCEDURE — 25510000001 IOPAMIDOL PER 1 ML: Performed by: INTERNAL MEDICINE

## 2023-05-09 PROCEDURE — 25010000002 HEPARIN (PORCINE) PER 1000 UNITS: Performed by: NURSE ANESTHETIST, CERTIFIED REGISTERED

## 2023-05-09 PROCEDURE — 25010000002 HEPARIN (PORCINE) 1000-0.9 UT/500ML-% SOLUTION: Performed by: INTERNAL MEDICINE

## 2023-05-09 PROCEDURE — C1894 INTRO/SHEATH, NON-LASER: HCPCS | Performed by: INTERNAL MEDICINE

## 2023-05-09 PROCEDURE — 25010000002 PROPOFOL 10 MG/ML EMULSION: Performed by: NURSE ANESTHETIST, CERTIFIED REGISTERED

## 2023-05-09 PROCEDURE — 25010000002 ONDANSETRON PER 1 MG: Performed by: NURSE ANESTHETIST, CERTIFIED REGISTERED

## 2023-05-09 PROCEDURE — 93355 ECHO TRANSESOPHAGEAL (TEE): CPT

## 2023-05-09 PROCEDURE — 85347 COAGULATION TIME ACTIVATED: CPT

## 2023-05-09 PROCEDURE — 02L73DK OCCLUSION OF LEFT ATRIAL APPENDAGE WITH INTRALUMINAL DEVICE, PERCUTANEOUS APPROACH: ICD-10-PCS | Performed by: INTERNAL MEDICINE

## 2023-05-09 PROCEDURE — B24BZZ4 ULTRASONOGRAPHY OF HEART WITH AORTA, TRANSESOPHAGEAL: ICD-10-PCS | Performed by: INTERNAL MEDICINE

## 2023-05-09 PROCEDURE — C1769 GUIDE WIRE: HCPCS | Performed by: INTERNAL MEDICINE

## 2023-05-09 PROCEDURE — 80048 BASIC METABOLIC PNL TOTAL CA: CPT | Performed by: INTERNAL MEDICINE

## 2023-05-09 PROCEDURE — 33340 PERQ CLSR TCAT L ATR APNDGE: CPT | Performed by: INTERNAL MEDICINE

## 2023-05-09 PROCEDURE — C1893 INTRO/SHEATH, FIXED,NON-PEEL: HCPCS | Performed by: INTERNAL MEDICINE

## 2023-05-09 DEVICE — LEFT ATRIAL APPENDAGE CLOSURE DEVICE WITH DELIVERY SYSTEM
Type: IMPLANTABLE DEVICE | Site: HEART | Status: FUNCTIONAL
Brand: WATCHMAN FLX™

## 2023-05-09 DEVICE — CAP WATCHMAN FLX PROC: Type: IMPLANTABLE DEVICE | Status: FUNCTIONAL

## 2023-05-09 DEVICE — CAP SYS WATCHMAN TRUSEAL ACC PROC: Type: IMPLANTABLE DEVICE | Status: FUNCTIONAL

## 2023-05-09 RX ORDER — ONDANSETRON 2 MG/ML
4 INJECTION INTRAMUSCULAR; INTRAVENOUS ONCE AS NEEDED
Status: DISCONTINUED | OUTPATIENT
Start: 2023-05-09 | End: 2023-05-09 | Stop reason: HOSPADM

## 2023-05-09 RX ORDER — SODIUM CHLORIDE 0.9 % (FLUSH) 0.9 %
3 SYRINGE (ML) INJECTION EVERY 12 HOURS SCHEDULED
Status: CANCELLED | OUTPATIENT
Start: 2023-05-09

## 2023-05-09 RX ORDER — FLUMAZENIL 0.1 MG/ML
0.2 INJECTION INTRAVENOUS AS NEEDED
Status: DISCONTINUED | OUTPATIENT
Start: 2023-05-09 | End: 2023-05-09 | Stop reason: HOSPADM

## 2023-05-09 RX ORDER — ASPIRIN 81 MG/1
TABLET, CHEWABLE ORAL
Status: DISPENSED
Start: 2023-05-09 | End: 2023-05-09

## 2023-05-09 RX ORDER — ONDANSETRON 2 MG/ML
INJECTION INTRAMUSCULAR; INTRAVENOUS AS NEEDED
Status: DISCONTINUED | OUTPATIENT
Start: 2023-05-09 | End: 2023-05-09 | Stop reason: SURG

## 2023-05-09 RX ORDER — SODIUM CHLORIDE 9 MG/ML
100 INJECTION, SOLUTION INTRAVENOUS CONTINUOUS
Status: DISCONTINUED | OUTPATIENT
Start: 2023-05-09 | End: 2023-05-09 | Stop reason: HOSPADM

## 2023-05-09 RX ORDER — ASPIRIN 81 MG/1
324 TABLET, CHEWABLE ORAL ONCE
Status: COMPLETED | OUTPATIENT
Start: 2023-05-09 | End: 2023-05-09

## 2023-05-09 RX ORDER — HEPARIN SODIUM 1000 [USP'U]/ML
INJECTION, SOLUTION INTRAVENOUS; SUBCUTANEOUS AS NEEDED
Status: DISCONTINUED | OUTPATIENT
Start: 2023-05-09 | End: 2023-05-09 | Stop reason: SURG

## 2023-05-09 RX ORDER — SODIUM CHLORIDE 0.9 % (FLUSH) 0.9 %
10 SYRINGE (ML) INJECTION AS NEEDED
Status: DISCONTINUED | OUTPATIENT
Start: 2023-05-09 | End: 2023-05-09 | Stop reason: HOSPADM

## 2023-05-09 RX ORDER — CLOPIDOGREL BISULFATE 75 MG/1
75 TABLET ORAL DAILY
Qty: 90 TABLET | Refills: 1 | Status: SHIPPED | OUTPATIENT
Start: 2023-05-09

## 2023-05-09 RX ORDER — SODIUM CHLORIDE 0.9 % (FLUSH) 0.9 %
10 SYRINGE (ML) INJECTION EVERY 12 HOURS SCHEDULED
Status: DISCONTINUED | OUTPATIENT
Start: 2023-05-09 | End: 2023-05-09 | Stop reason: HOSPADM

## 2023-05-09 RX ORDER — LIDOCAINE HYDROCHLORIDE 10 MG/ML
0.5 INJECTION, SOLUTION EPIDURAL; INFILTRATION; INTRACAUDAL; PERINEURAL ONCE AS NEEDED
Status: CANCELLED | OUTPATIENT
Start: 2023-05-09

## 2023-05-09 RX ORDER — BUPIVACAINE HCL/0.9 % NACL/PF 0.125 %
PLASTIC BAG, INJECTION (ML) EPIDURAL AS NEEDED
Status: DISCONTINUED | OUTPATIENT
Start: 2023-05-09 | End: 2023-05-09 | Stop reason: SURG

## 2023-05-09 RX ORDER — SUCCINYLCHOLINE/SOD CL,ISO/PF 200MG/10ML
SYRINGE (ML) INTRAVENOUS AS NEEDED
Status: DISCONTINUED | OUTPATIENT
Start: 2023-05-09 | End: 2023-05-09 | Stop reason: SURG

## 2023-05-09 RX ORDER — SODIUM CHLORIDE, SODIUM LACTATE, POTASSIUM CHLORIDE, CALCIUM CHLORIDE 600; 310; 30; 20 MG/100ML; MG/100ML; MG/100ML; MG/100ML
100 INJECTION, SOLUTION INTRAVENOUS CONTINUOUS
Status: CANCELLED | OUTPATIENT
Start: 2023-05-09

## 2023-05-09 RX ORDER — SODIUM CHLORIDE, SODIUM LACTATE, POTASSIUM CHLORIDE, CALCIUM CHLORIDE 600; 310; 30; 20 MG/100ML; MG/100ML; MG/100ML; MG/100ML
1 INJECTION, SOLUTION INTRAVENOUS CONTINUOUS
Status: DISCONTINUED | OUTPATIENT
Start: 2023-05-09 | End: 2023-05-09 | Stop reason: HOSPADM

## 2023-05-09 RX ORDER — OXYCODONE AND ACETAMINOPHEN 7.5; 325 MG/1; MG/1
2 TABLET ORAL EVERY 4 HOURS PRN
Status: DISCONTINUED | OUTPATIENT
Start: 2023-05-09 | End: 2023-05-09 | Stop reason: HOSPADM

## 2023-05-09 RX ORDER — ROCURONIUM BROMIDE 10 MG/ML
INJECTION, SOLUTION INTRAVENOUS AS NEEDED
Status: DISCONTINUED | OUTPATIENT
Start: 2023-05-09 | End: 2023-05-09 | Stop reason: SURG

## 2023-05-09 RX ORDER — OXYCODONE AND ACETAMINOPHEN 10; 325 MG/1; MG/1
1 TABLET ORAL ONCE AS NEEDED
Status: DISCONTINUED | OUTPATIENT
Start: 2023-05-09 | End: 2023-05-09 | Stop reason: HOSPADM

## 2023-05-09 RX ORDER — EPHEDRINE SULFATE 50 MG/ML
INJECTION, SOLUTION INTRAVENOUS AS NEEDED
Status: DISCONTINUED | OUTPATIENT
Start: 2023-05-09 | End: 2023-05-09 | Stop reason: SURG

## 2023-05-09 RX ORDER — NEOSTIGMINE METHYLSULFATE 5 MG/5 ML
SYRINGE (ML) INTRAVENOUS AS NEEDED
Status: DISCONTINUED | OUTPATIENT
Start: 2023-05-09 | End: 2023-05-09 | Stop reason: SURG

## 2023-05-09 RX ORDER — SODIUM CHLORIDE 0.9 % (FLUSH) 0.9 %
3-10 SYRINGE (ML) INJECTION AS NEEDED
Status: CANCELLED | OUTPATIENT
Start: 2023-05-09

## 2023-05-09 RX ORDER — SODIUM CHLORIDE 9 MG/ML
40 INJECTION, SOLUTION INTRAVENOUS AS NEEDED
Status: CANCELLED | OUTPATIENT
Start: 2023-05-09

## 2023-05-09 RX ORDER — PROPOFOL 10 MG/ML
VIAL (ML) INTRAVENOUS AS NEEDED
Status: DISCONTINUED | OUTPATIENT
Start: 2023-05-09 | End: 2023-05-09 | Stop reason: SURG

## 2023-05-09 RX ORDER — SODIUM CHLORIDE, SODIUM LACTATE, POTASSIUM CHLORIDE, CALCIUM CHLORIDE 600; 310; 30; 20 MG/100ML; MG/100ML; MG/100ML; MG/100ML
INJECTION, SOLUTION INTRAVENOUS CONTINUOUS PRN
Status: DISCONTINUED | OUTPATIENT
Start: 2023-05-09 | End: 2023-05-09 | Stop reason: SURG

## 2023-05-09 RX ORDER — LABETALOL HYDROCHLORIDE 5 MG/ML
5 INJECTION, SOLUTION INTRAVENOUS
Status: DISCONTINUED | OUTPATIENT
Start: 2023-05-09 | End: 2023-05-09 | Stop reason: HOSPADM

## 2023-05-09 RX ORDER — ACETAMINOPHEN 325 MG/1
650 TABLET ORAL EVERY 4 HOURS PRN
Status: DISCONTINUED | OUTPATIENT
Start: 2023-05-09 | End: 2023-05-09 | Stop reason: HOSPADM

## 2023-05-09 RX ORDER — ASPIRIN 81 MG/1
81 TABLET ORAL DAILY
Start: 2023-05-09

## 2023-05-09 RX ORDER — HEPARIN SODIUM 200 [USP'U]/100ML
INJECTION, SOLUTION INTRAVENOUS
Status: DISCONTINUED | OUTPATIENT
Start: 2023-05-09 | End: 2023-05-09 | Stop reason: HOSPADM

## 2023-05-09 RX ORDER — LIDOCAINE HYDROCHLORIDE 20 MG/ML
INJECTION, SOLUTION EPIDURAL; INFILTRATION; INTRACAUDAL; PERINEURAL AS NEEDED
Status: DISCONTINUED | OUTPATIENT
Start: 2023-05-09 | End: 2023-05-09 | Stop reason: SURG

## 2023-05-09 RX ORDER — NALOXONE HCL 0.4 MG/ML
0.4 VIAL (ML) INJECTION AS NEEDED
Status: DISCONTINUED | OUTPATIENT
Start: 2023-05-09 | End: 2023-05-09 | Stop reason: HOSPADM

## 2023-05-09 RX ADMIN — Medication 200 MCG: at 08:58

## 2023-05-09 RX ADMIN — Medication 200 MCG: at 09:02

## 2023-05-09 RX ADMIN — SODIUM CHLORIDE, POTASSIUM CHLORIDE, SODIUM LACTATE AND CALCIUM CHLORIDE: 600; 310; 30; 20 INJECTION, SOLUTION INTRAVENOUS at 08:02

## 2023-05-09 RX ADMIN — SODIUM CHLORIDE 500 ML: 9 INJECTION, SOLUTION INTRAVENOUS at 10:07

## 2023-05-09 RX ADMIN — GLYCOPYRROLATE 0.4 MG: 0.2 INJECTION INTRAMUSCULAR; INTRAVENOUS at 09:14

## 2023-05-09 RX ADMIN — PROPOFOL 30 MG: 10 INJECTION, EMULSION INTRAVENOUS at 08:42

## 2023-05-09 RX ADMIN — PROPOFOL 200 MG: 10 INJECTION, EMULSION INTRAVENOUS at 08:09

## 2023-05-09 RX ADMIN — ASPIRIN 324 MG: 81 TABLET, CHEWABLE ORAL at 07:31

## 2023-05-09 RX ADMIN — EPHEDRINE SULFATE 10 MG: 50 INJECTION INTRAVENOUS at 08:29

## 2023-05-09 RX ADMIN — Medication 100 MCG: at 08:53

## 2023-05-09 RX ADMIN — ROCURONIUM BROMIDE 20 MG: 10 SOLUTION INTRAVENOUS at 08:42

## 2023-05-09 RX ADMIN — Medication 100 MCG: at 08:11

## 2023-05-09 RX ADMIN — HEPARIN SODIUM 5000 UNITS: 1000 INJECTION, SOLUTION INTRAVENOUS; SUBCUTANEOUS at 08:33

## 2023-05-09 RX ADMIN — CEFAZOLIN 2 G: 2 INJECTION, POWDER, FOR SOLUTION INTRAMUSCULAR; INTRAVENOUS at 08:15

## 2023-05-09 RX ADMIN — EPHEDRINE SULFATE 10 MG: 50 INJECTION INTRAVENOUS at 08:51

## 2023-05-09 RX ADMIN — Medication 3 MG: at 09:14

## 2023-05-09 RX ADMIN — SODIUM CHLORIDE, POTASSIUM CHLORIDE, SODIUM LACTATE AND CALCIUM CHLORIDE: 600; 310; 30; 20 INJECTION, SOLUTION INTRAVENOUS at 08:07

## 2023-05-09 RX ADMIN — ROCURONIUM BROMIDE 10 MG: 10 SOLUTION INTRAVENOUS at 08:09

## 2023-05-09 RX ADMIN — ONDANSETRON 4 MG: 2 INJECTION INTRAMUSCULAR; INTRAVENOUS at 09:13

## 2023-05-09 RX ADMIN — Medication 120 MG: at 08:09

## 2023-05-09 RX ADMIN — PROPOFOL 50 MG: 10 INJECTION, EMULSION INTRAVENOUS at 08:21

## 2023-05-09 RX ADMIN — LIDOCAINE HYDROCHLORIDE 100 MG: 20 INJECTION, SOLUTION EPIDURAL; INFILTRATION; INTRACAUDAL; PERINEURAL at 08:09

## 2023-05-09 RX ADMIN — Medication 100 MCG: at 08:42

## 2023-05-09 RX ADMIN — EPHEDRINE SULFATE 10 MG: 50 INJECTION INTRAVENOUS at 08:58

## 2023-05-09 RX ADMIN — Medication 100 MCG: at 08:32

## 2023-05-09 RX ADMIN — EPHEDRINE SULFATE 10 MG: 50 INJECTION INTRAVENOUS at 08:33

## 2023-05-09 RX ADMIN — Medication 200 MCG: at 08:26

## 2023-05-09 RX ADMIN — EPHEDRINE SULFATE 10 MG: 50 INJECTION INTRAVENOUS at 08:42

## 2023-05-09 RX ADMIN — HEPARIN SODIUM 5000 UNITS: 1000 INJECTION, SOLUTION INTRAVENOUS; SUBCUTANEOUS at 08:47

## 2023-05-11 LAB — ACT BLD: 257 SECONDS (ref 82–152)

## 2023-05-14 LAB
QT INTERVAL: 390 MS
QTC INTERVAL: 438 MS

## 2023-06-15 ENCOUNTER — ANESTHESIA EVENT (OUTPATIENT)
Dept: CARDIOLOGY | Facility: HOSPITAL | Age: 68
End: 2023-06-15
Payer: MEDICARE

## 2023-06-16 ENCOUNTER — ANESTHESIA (OUTPATIENT)
Dept: CARDIOLOGY | Facility: HOSPITAL | Age: 68
End: 2023-06-16
Payer: MEDICARE

## 2023-06-16 ENCOUNTER — HOSPITAL ENCOUNTER (OUTPATIENT)
Dept: CARDIOLOGY | Facility: HOSPITAL | Age: 68
Discharge: HOME | End: 2023-06-16
Payer: MEDICARE

## 2023-06-16 ENCOUNTER — TELEPHONE (OUTPATIENT)
Dept: CARDIOLOGY | Facility: CLINIC | Age: 68
End: 2023-06-16

## 2023-06-16 VITALS
OXYGEN SATURATION: 93 % | WEIGHT: 235 LBS | DIASTOLIC BLOOD PRESSURE: 89 MMHG | SYSTOLIC BLOOD PRESSURE: 130 MMHG | HEART RATE: 59 BPM | TEMPERATURE: 98.5 F | BODY MASS INDEX: 27.19 KG/M2 | HEIGHT: 78 IN | RESPIRATION RATE: 18 BRPM

## 2023-06-16 DIAGNOSIS — I48.0 PAROXYSMAL A-FIB: ICD-10-CM

## 2023-06-16 DIAGNOSIS — Z95.818 PRESENCE OF WATCHMAN LEFT ATRIAL APPENDAGE CLOSURE DEVICE: ICD-10-CM

## 2023-06-16 PROBLEM — Z79.01 CHRONIC ANTICOAGULATION: Status: RESOLVED | Noted: 2023-05-09 | Resolved: 2023-06-16

## 2023-06-16 PROCEDURE — 25010000002 PROPOFOL 1000 MG/100ML EMULSION: Performed by: NURSE ANESTHETIST, CERTIFIED REGISTERED

## 2023-06-16 PROCEDURE — 93325 DOPPLER ECHO COLOR FLOW MAPG: CPT | Performed by: INTERNAL MEDICINE

## 2023-06-16 PROCEDURE — 93312 ECHO TRANSESOPHAGEAL: CPT

## 2023-06-16 PROCEDURE — 93325 DOPPLER ECHO COLOR FLOW MAPG: CPT

## 2023-06-16 PROCEDURE — 93321 DOPPLER ECHO F-UP/LMTD STD: CPT

## 2023-06-16 PROCEDURE — 93321 DOPPLER ECHO F-UP/LMTD STD: CPT | Performed by: INTERNAL MEDICINE

## 2023-06-16 PROCEDURE — 93312 ECHO TRANSESOPHAGEAL: CPT | Performed by: INTERNAL MEDICINE

## 2023-06-16 RX ORDER — PROPOFOL 10 MG/ML
INJECTION, EMULSION INTRAVENOUS CONTINUOUS PRN
Status: DISCONTINUED | OUTPATIENT
Start: 2023-06-16 | End: 2023-06-16 | Stop reason: SURG

## 2023-06-16 RX ORDER — SODIUM CHLORIDE 9 MG/ML
INJECTION, SOLUTION INTRAVENOUS CONTINUOUS PRN
Status: DISCONTINUED | OUTPATIENT
Start: 2023-06-16 | End: 2023-06-16 | Stop reason: SURG

## 2023-06-16 RX ADMIN — PROPOFOL 25 MCG/KG/MIN: 10 INJECTION, EMULSION INTRAVENOUS at 07:47

## 2023-06-16 RX ADMIN — SODIUM CHLORIDE: 9 INJECTION, SOLUTION INTRAVENOUS at 07:43

## 2023-08-08 ENCOUNTER — TELEPHONE (OUTPATIENT)
Dept: CT IMAGING | Facility: HOSPITAL | Age: 68
End: 2023-08-08
Payer: MEDICARE

## 2023-10-06 ENCOUNTER — OFFICE VISIT (OUTPATIENT)
Dept: CARDIOLOGY | Facility: CLINIC | Age: 68
End: 2023-10-06
Payer: MEDICARE

## 2023-10-06 VITALS
HEIGHT: 78 IN | DIASTOLIC BLOOD PRESSURE: 78 MMHG | HEART RATE: 67 BPM | BODY MASS INDEX: 27.37 KG/M2 | WEIGHT: 236.6 LBS | OXYGEN SATURATION: 98 % | SYSTOLIC BLOOD PRESSURE: 104 MMHG

## 2023-10-06 DIAGNOSIS — Z95.818 PRESENCE OF WATCHMAN LEFT ATRIAL APPENDAGE CLOSURE DEVICE: ICD-10-CM

## 2023-10-06 DIAGNOSIS — Z86.79 S/P AAA (ABDOMINAL AORTIC ANEURYSM) REPAIR: ICD-10-CM

## 2023-10-06 DIAGNOSIS — Z98.890 S/P AAA (ABDOMINAL AORTIC ANEURYSM) REPAIR: ICD-10-CM

## 2023-10-06 DIAGNOSIS — Z72.0 TOBACCO USE: ICD-10-CM

## 2023-10-06 DIAGNOSIS — I10 PRIMARY HYPERTENSION: ICD-10-CM

## 2023-10-06 DIAGNOSIS — I48.0 PAF (PAROXYSMAL ATRIAL FIBRILLATION): Primary | ICD-10-CM

## 2023-10-06 PROCEDURE — 99214 OFFICE O/P EST MOD 30 MIN: CPT | Performed by: NURSE PRACTITIONER

## 2023-10-06 PROCEDURE — 1159F MED LIST DOCD IN RCRD: CPT | Performed by: NURSE PRACTITIONER

## 2023-10-06 PROCEDURE — 3078F DIAST BP <80 MM HG: CPT | Performed by: NURSE PRACTITIONER

## 2023-10-06 PROCEDURE — 93000 ELECTROCARDIOGRAM COMPLETE: CPT | Performed by: NURSE PRACTITIONER

## 2023-10-06 PROCEDURE — 1160F RVW MEDS BY RX/DR IN RCRD: CPT | Performed by: NURSE PRACTITIONER

## 2023-10-06 PROCEDURE — 3074F SYST BP LT 130 MM HG: CPT | Performed by: NURSE PRACTITIONER

## 2023-12-06 ENCOUNTER — TELEPHONE (OUTPATIENT)
Dept: CARDIOLOGY | Facility: CLINIC | Age: 68
End: 2023-12-06
Payer: MEDICARE

## 2024-01-10 ENCOUNTER — TELEPHONE (OUTPATIENT)
Dept: VASCULAR SURGERY | Facility: CLINIC | Age: 69
End: 2024-01-10
Payer: MEDICARE

## 2024-01-11 ENCOUNTER — HOSPITAL ENCOUNTER (OUTPATIENT)
Dept: CT IMAGING | Facility: HOSPITAL | Age: 69
Discharge: HOME | End: 2024-01-11
Payer: MEDICARE

## 2024-01-11 ENCOUNTER — OFFICE VISIT (OUTPATIENT)
Dept: VASCULAR SURGERY | Facility: CLINIC | Age: 69
End: 2024-01-11
Payer: MEDICARE

## 2024-01-11 ENCOUNTER — HOSPITAL ENCOUNTER (OUTPATIENT)
Dept: ULTRASOUND IMAGING | Facility: HOSPITAL | Age: 69
Discharge: HOME | End: 2024-01-11
Payer: MEDICARE

## 2024-01-11 VITALS
DIASTOLIC BLOOD PRESSURE: 80 MMHG | SYSTOLIC BLOOD PRESSURE: 132 MMHG | WEIGHT: 246 LBS | BODY MASS INDEX: 28.46 KG/M2 | OXYGEN SATURATION: 98 % | HEIGHT: 78 IN | HEART RATE: 79 BPM

## 2024-01-11 DIAGNOSIS — I71.40 ABDOMINAL AORTIC ANEURYSM (AAA) GREATER THAN 5.5 CM IN DIAMETER IN MALE: Primary | ICD-10-CM

## 2024-01-11 DIAGNOSIS — I10 PRIMARY HYPERTENSION: ICD-10-CM

## 2024-01-11 DIAGNOSIS — I65.23 BILATERAL CAROTID ARTERY STENOSIS: ICD-10-CM

## 2024-01-11 DIAGNOSIS — E78.5 HYPERLIPIDEMIA, UNSPECIFIED HYPERLIPIDEMIA TYPE: ICD-10-CM

## 2024-01-11 DIAGNOSIS — I71.40 ABDOMINAL AORTIC ANEURYSM (AAA) GREATER THAN 5.5 CM IN DIAMETER IN MALE: ICD-10-CM

## 2024-01-11 DIAGNOSIS — Z72.0 TOBACCO USE: ICD-10-CM

## 2024-01-11 LAB — CREAT BLDA-MCNC: 0.8 MG/DL (ref 0.6–1.3)

## 2024-01-11 PROCEDURE — 74174 CTA ABD&PLVS W/CONTRAST: CPT

## 2024-01-11 PROCEDURE — 99214 OFFICE O/P EST MOD 30 MIN: CPT | Performed by: NURSE PRACTITIONER

## 2024-01-11 PROCEDURE — 3079F DIAST BP 80-89 MM HG: CPT | Performed by: NURSE PRACTITIONER

## 2024-01-11 PROCEDURE — 82565 ASSAY OF CREATININE: CPT

## 2024-01-11 PROCEDURE — 25510000001 IOPAMIDOL PER 1 ML: Performed by: NURSE PRACTITIONER

## 2024-01-11 PROCEDURE — 1159F MED LIST DOCD IN RCRD: CPT | Performed by: NURSE PRACTITIONER

## 2024-01-11 PROCEDURE — 1160F RVW MEDS BY RX/DR IN RCRD: CPT | Performed by: NURSE PRACTITIONER

## 2024-01-11 PROCEDURE — 93880 EXTRACRANIAL BILAT STUDY: CPT

## 2024-01-11 PROCEDURE — 3075F SYST BP GE 130 - 139MM HG: CPT | Performed by: NURSE PRACTITIONER

## 2024-01-11 RX ADMIN — IOPAMIDOL 100 ML: 755 INJECTION, SOLUTION INTRAVENOUS at 08:56

## 2024-02-20 ENCOUNTER — HOSPITAL ENCOUNTER (OUTPATIENT)
Dept: CARDIOLOGY | Facility: HOSPITAL | Age: 69
Discharge: HOME | End: 2024-02-20
Payer: MEDICARE

## 2024-02-20 ENCOUNTER — TRANSCRIBE ORDERS (OUTPATIENT)
Dept: ADMINISTRATIVE | Facility: HOSPITAL | Age: 69
End: 2024-02-20
Payer: MEDICARE

## 2024-02-20 ENCOUNTER — LAB (OUTPATIENT)
Dept: LAB | Facility: HOSPITAL | Age: 69
End: 2024-02-20
Payer: MEDICARE

## 2024-02-20 DIAGNOSIS — F11.90 OPIOID USE DISORDER: ICD-10-CM

## 2024-02-20 DIAGNOSIS — F11.90 CHRONIC, CONTINUOUS USE OF OPIOIDS: ICD-10-CM

## 2024-02-20 DIAGNOSIS — F11.90 CHRONIC, CONTINUOUS USE OF OPIOIDS: Primary | ICD-10-CM

## 2024-02-20 LAB
ALBUMIN SERPL-MCNC: 4.1 G/DL (ref 3.5–5.2)
ALBUMIN/GLOB SERPL: 1.3 G/DL
ALP SERPL-CCNC: 87 U/L (ref 39–117)
ALT SERPL W P-5'-P-CCNC: 25 U/L (ref 1–41)
ANION GAP SERPL CALCULATED.3IONS-SCNC: 13 MMOL/L (ref 5–15)
AST SERPL-CCNC: 27 U/L (ref 1–40)
BILIRUB SERPL-MCNC: 0.4 MG/DL (ref 0–1.2)
BUN SERPL-MCNC: 7 MG/DL (ref 8–23)
BUN/CREAT SERPL: 9 (ref 7–25)
CALCIUM SPEC-SCNC: 9.3 MG/DL (ref 8.6–10.5)
CHLORIDE SERPL-SCNC: 99 MMOL/L (ref 98–107)
CO2 SERPL-SCNC: 23 MMOL/L (ref 22–29)
CREAT SERPL-MCNC: 0.78 MG/DL (ref 0.76–1.27)
EGFRCR SERPLBLD CKD-EPI 2021: 98.4 ML/MIN/1.73
GLOBULIN UR ELPH-MCNC: 3.2 GM/DL
GLUCOSE SERPL-MCNC: 89 MG/DL (ref 65–99)
POTASSIUM SERPL-SCNC: 3.9 MMOL/L (ref 3.5–5.2)
PROT SERPL-MCNC: 7.3 G/DL (ref 6–8.5)
SODIUM SERPL-SCNC: 135 MMOL/L (ref 136–145)

## 2024-02-20 PROCEDURE — 93010 ELECTROCARDIOGRAM REPORT: CPT | Performed by: EMERGENCY MEDICINE

## 2024-02-20 PROCEDURE — 80053 COMPREHEN METABOLIC PANEL: CPT

## 2024-02-20 PROCEDURE — 93005 ELECTROCARDIOGRAM TRACING: CPT | Performed by: PAIN MEDICINE

## 2024-02-20 PROCEDURE — 36415 COLL VENOUS BLD VENIPUNCTURE: CPT

## 2024-02-21 LAB
QT INTERVAL: 432 MS
QTC INTERVAL: 432 MS

## 2024-05-08 ENCOUNTER — TELEPHONE (OUTPATIENT)
Dept: CARDIOLOGY | Facility: CLINIC | Age: 69
End: 2024-05-08
Payer: MEDICARE

## 2024-05-08 ENCOUNTER — PREP FOR SURGERY (OUTPATIENT)
Dept: OTHER | Facility: HOSPITAL | Age: 69
End: 2024-05-08
Payer: MEDICARE

## 2024-05-08 DIAGNOSIS — I48.0 PAF (PAROXYSMAL ATRIAL FIBRILLATION): Primary | ICD-10-CM

## 2024-05-08 RX ORDER — SODIUM CHLORIDE 0.9 % (FLUSH) 0.9 %
10 SYRINGE (ML) INJECTION AS NEEDED
OUTPATIENT
Start: 2024-05-08

## 2024-05-08 RX ORDER — SODIUM CHLORIDE 0.9 % (FLUSH) 0.9 %
10 SYRINGE (ML) INJECTION EVERY 12 HOURS SCHEDULED
OUTPATIENT
Start: 2024-05-08

## 2024-05-08 RX ORDER — SODIUM CHLORIDE 9 MG/ML
40 INJECTION, SOLUTION INTRAVENOUS AS NEEDED
OUTPATIENT
Start: 2024-05-08

## 2024-05-08 RX ORDER — SODIUM CHLORIDE 9 MG/ML
20 INJECTION, SOLUTION INTRAVENOUS CONTINUOUS
OUTPATIENT
Start: 2024-05-08

## 2024-05-15 ENCOUNTER — OFFICE VISIT (OUTPATIENT)
Dept: CARDIOLOGY | Facility: CLINIC | Age: 69
End: 2024-05-15
Payer: MEDICARE

## 2024-05-15 VITALS
HEIGHT: 78 IN | WEIGHT: 233 LBS | SYSTOLIC BLOOD PRESSURE: 110 MMHG | HEART RATE: 113 BPM | DIASTOLIC BLOOD PRESSURE: 78 MMHG | BODY MASS INDEX: 26.96 KG/M2

## 2024-05-15 DIAGNOSIS — Z95.818 PRESENCE OF WATCHMAN LEFT ATRIAL APPENDAGE CLOSURE DEVICE: ICD-10-CM

## 2024-05-15 DIAGNOSIS — Z98.890 S/P AAA (ABDOMINAL AORTIC ANEURYSM) REPAIR: ICD-10-CM

## 2024-05-15 DIAGNOSIS — I48.92 ATRIAL FLUTTER WITH RAPID VENTRICULAR RESPONSE: ICD-10-CM

## 2024-05-15 DIAGNOSIS — I10 PRIMARY HYPERTENSION: Primary | ICD-10-CM

## 2024-05-15 DIAGNOSIS — Z86.79 S/P AAA (ABDOMINAL AORTIC ANEURYSM) REPAIR: ICD-10-CM

## 2024-05-15 DIAGNOSIS — I48.0 PAF (PAROXYSMAL ATRIAL FIBRILLATION): ICD-10-CM

## 2024-05-15 DIAGNOSIS — Z72.0 TOBACCO USE: ICD-10-CM

## 2024-05-15 PROCEDURE — 3078F DIAST BP <80 MM HG: CPT | Performed by: NURSE PRACTITIONER

## 2024-05-15 PROCEDURE — 1160F RVW MEDS BY RX/DR IN RCRD: CPT | Performed by: NURSE PRACTITIONER

## 2024-05-15 PROCEDURE — 3074F SYST BP LT 130 MM HG: CPT | Performed by: NURSE PRACTITIONER

## 2024-05-15 PROCEDURE — 93000 ELECTROCARDIOGRAM COMPLETE: CPT | Performed by: NURSE PRACTITIONER

## 2024-05-15 PROCEDURE — 99214 OFFICE O/P EST MOD 30 MIN: CPT | Performed by: NURSE PRACTITIONER

## 2024-05-15 PROCEDURE — 1159F MED LIST DOCD IN RCRD: CPT | Performed by: NURSE PRACTITIONER

## 2024-05-22 ENCOUNTER — TELEPHONE (OUTPATIENT)
Dept: CARDIOLOGY | Facility: CLINIC | Age: 69
End: 2024-05-22

## 2024-06-04 ENCOUNTER — TELEPHONE (OUTPATIENT)
Dept: CARDIOLOGY | Facility: CLINIC | Age: 69
End: 2024-06-04
Payer: MEDICARE

## 2024-06-05 ENCOUNTER — ANESTHESIA EVENT (OUTPATIENT)
Dept: CARDIOLOGY | Facility: HOSPITAL | Age: 69
End: 2024-06-05
Payer: MEDICARE

## 2024-06-05 ENCOUNTER — ANESTHESIA (OUTPATIENT)
Dept: CARDIOLOGY | Facility: HOSPITAL | Age: 69
End: 2024-06-05
Payer: MEDICARE

## 2024-06-05 ENCOUNTER — HOSPITAL ENCOUNTER (OUTPATIENT)
Dept: CARDIOLOGY | Facility: HOSPITAL | Age: 69
Discharge: HOME | End: 2024-06-05
Payer: MEDICARE

## 2024-06-05 VITALS
OXYGEN SATURATION: 99 % | TEMPERATURE: 97.1 F | DIASTOLIC BLOOD PRESSURE: 87 MMHG | HEIGHT: 78 IN | SYSTOLIC BLOOD PRESSURE: 110 MMHG | RESPIRATION RATE: 23 BRPM | HEART RATE: 73 BPM | WEIGHT: 223 LBS | BODY MASS INDEX: 25.8 KG/M2

## 2024-06-05 VITALS — HEART RATE: 69 BPM | OXYGEN SATURATION: 95 % | DIASTOLIC BLOOD PRESSURE: 73 MMHG | SYSTOLIC BLOOD PRESSURE: 92 MMHG

## 2024-06-05 DIAGNOSIS — I48.0 PAF (PAROXYSMAL ATRIAL FIBRILLATION): ICD-10-CM

## 2024-06-05 PROCEDURE — 93325 DOPPLER ECHO COLOR FLOW MAPG: CPT

## 2024-06-05 PROCEDURE — 25010000002 PROPOFOL 10 MG/ML EMULSION: Performed by: NURSE ANESTHETIST, CERTIFIED REGISTERED

## 2024-06-05 RX ORDER — SODIUM CHLORIDE 0.9 % (FLUSH) 0.9 %
10 SYRINGE (ML) INJECTION AS NEEDED
Status: DISCONTINUED | OUTPATIENT
Start: 2024-06-05 | End: 2024-06-06 | Stop reason: HOSPADM

## 2024-06-05 RX ORDER — SODIUM CHLORIDE 9 MG/ML
40 INJECTION, SOLUTION INTRAVENOUS AS NEEDED
Status: DISCONTINUED | OUTPATIENT
Start: 2024-06-05 | End: 2024-06-06 | Stop reason: HOSPADM

## 2024-06-05 RX ORDER — SODIUM CHLORIDE 0.9 % (FLUSH) 0.9 %
10 SYRINGE (ML) INJECTION EVERY 12 HOURS SCHEDULED
Status: DISCONTINUED | OUTPATIENT
Start: 2024-06-05 | End: 2024-06-06 | Stop reason: HOSPADM

## 2024-06-05 RX ORDER — SODIUM CHLORIDE 9 MG/ML
20 INJECTION, SOLUTION INTRAVENOUS CONTINUOUS
Status: DISCONTINUED | OUTPATIENT
Start: 2024-06-05 | End: 2024-06-06 | Stop reason: HOSPADM

## 2024-06-05 RX ORDER — LIDOCAINE HYDROCHLORIDE 20 MG/ML
INJECTION, SOLUTION EPIDURAL; INFILTRATION; INTRACAUDAL; PERINEURAL AS NEEDED
Status: DISCONTINUED | OUTPATIENT
Start: 2024-06-05 | End: 2024-06-05 | Stop reason: SURG

## 2024-06-05 RX ORDER — PROPOFOL 10 MG/ML
VIAL (ML) INTRAVENOUS AS NEEDED
Status: DISCONTINUED | OUTPATIENT
Start: 2024-06-05 | End: 2024-06-05 | Stop reason: SURG

## 2024-06-05 RX ADMIN — LIDOCAINE HYDROCHLORIDE 150 MG: 20 INJECTION, SOLUTION EPIDURAL; INFILTRATION; INTRACAUDAL; PERINEURAL at 09:19

## 2024-06-05 RX ADMIN — PROPOFOL 120 MG: 10 INJECTION, EMULSION INTRAVENOUS at 09:19

## 2024-08-06 ENCOUNTER — PREP FOR SURGERY (OUTPATIENT)
Dept: OTHER | Facility: HOSPITAL | Age: 69
End: 2024-08-06
Payer: MEDICARE

## 2024-08-06 ENCOUNTER — OFFICE VISIT (OUTPATIENT)
Dept: CARDIOLOGY | Facility: CLINIC | Age: 69
End: 2024-08-06
Payer: MEDICARE

## 2024-08-06 VITALS
HEIGHT: 78 IN | OXYGEN SATURATION: 98 % | BODY MASS INDEX: 27.65 KG/M2 | DIASTOLIC BLOOD PRESSURE: 70 MMHG | WEIGHT: 239 LBS | HEART RATE: 60 BPM | SYSTOLIC BLOOD PRESSURE: 126 MMHG

## 2024-08-06 DIAGNOSIS — I48.0 PAF (PAROXYSMAL ATRIAL FIBRILLATION): Primary | ICD-10-CM

## 2024-08-06 PROCEDURE — 99204 OFFICE O/P NEW MOD 45 MIN: CPT | Performed by: STUDENT IN AN ORGANIZED HEALTH CARE EDUCATION/TRAINING PROGRAM

## 2024-08-06 PROCEDURE — 3078F DIAST BP <80 MM HG: CPT | Performed by: STUDENT IN AN ORGANIZED HEALTH CARE EDUCATION/TRAINING PROGRAM

## 2024-08-06 PROCEDURE — 1159F MED LIST DOCD IN RCRD: CPT | Performed by: STUDENT IN AN ORGANIZED HEALTH CARE EDUCATION/TRAINING PROGRAM

## 2024-08-06 PROCEDURE — 1160F RVW MEDS BY RX/DR IN RCRD: CPT | Performed by: STUDENT IN AN ORGANIZED HEALTH CARE EDUCATION/TRAINING PROGRAM

## 2024-08-06 PROCEDURE — 93000 ELECTROCARDIOGRAM COMPLETE: CPT | Performed by: STUDENT IN AN ORGANIZED HEALTH CARE EDUCATION/TRAINING PROGRAM

## 2024-08-06 PROCEDURE — 3074F SYST BP LT 130 MM HG: CPT | Performed by: STUDENT IN AN ORGANIZED HEALTH CARE EDUCATION/TRAINING PROGRAM

## 2024-08-06 RX ORDER — SODIUM CHLORIDE 0.9 % (FLUSH) 0.9 %
10 SYRINGE (ML) INJECTION EVERY 12 HOURS SCHEDULED
OUTPATIENT
Start: 2024-08-06

## 2024-08-06 RX ORDER — SODIUM CHLORIDE 0.9 % (FLUSH) 0.9 %
10 SYRINGE (ML) INJECTION AS NEEDED
OUTPATIENT
Start: 2024-08-06

## 2024-08-06 RX ORDER — SODIUM CHLORIDE 9 MG/ML
40 INJECTION, SOLUTION INTRAVENOUS AS NEEDED
OUTPATIENT
Start: 2024-08-06

## 2024-08-08 ENCOUNTER — HOSPITAL ENCOUNTER (EMERGENCY)
Facility: HOSPITAL | Age: 69
Discharge: HOME | End: 2024-08-09
Attending: EMERGENCY MEDICINE
Payer: MEDICARE

## 2024-08-08 DIAGNOSIS — S51.012A ELBOW LACERATION, LEFT, INITIAL ENCOUNTER: ICD-10-CM
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
## 2024-08-08 DIAGNOSIS — F10.10 ALCOHOL ABUSE: ICD-10-CM

## 2024-08-08 PROCEDURE — 99284 EMERGENCY DEPT VISIT MOD MDM: CPT

## 2024-08-09 ENCOUNTER — APPOINTMENT (OUTPATIENT)
Dept: CT IMAGING | Facility: HOSPITAL | Age: 69
End: 2024-08-09
Payer: MEDICARE

## 2024-08-09 ENCOUNTER — APPOINTMENT (OUTPATIENT)
Dept: GENERAL RADIOLOGY | Facility: HOSPITAL | Age: 69
End: 2024-08-09
Payer: MEDICARE

## 2024-08-09 VITALS
HEIGHT: 78 IN | WEIGHT: 243 LBS | BODY MASS INDEX: 28.11 KG/M2 | DIASTOLIC BLOOD PRESSURE: 86 MMHG | OXYGEN SATURATION: 96 % | RESPIRATION RATE: 18 BRPM | SYSTOLIC BLOOD PRESSURE: 124 MMHG | HEART RATE: 75 BPM | TEMPERATURE: 98.1 F

## 2024-08-09 PROCEDURE — 25010000002 LIDOCAINE 1 % SOLUTION: Performed by: EMERGENCY MEDICINE

## 2024-08-09 PROCEDURE — 25010000002 TETANUS-DIPHTH-ACELL PERTUSSIS 5-2.5-18.5 LF-MCG/0.5 SUSPENSION PREFILLED SYRINGE: Performed by: EMERGENCY MEDICINE

## 2024-08-09 PROCEDURE — 25810000003 SODIUM CHLORIDE 0.9 % SOLUTION: Performed by: EMERGENCY MEDICINE

## 2024-08-09 PROCEDURE — 90715 TDAP VACCINE 7 YRS/> IM: CPT | Performed by: EMERGENCY MEDICINE

## 2024-08-09 PROCEDURE — 70450 CT HEAD/BRAIN W/O DYE: CPT

## 2024-08-09 PROCEDURE — 73070 X-RAY EXAM OF ELBOW: CPT

## 2024-08-09 PROCEDURE — 90471 IMMUNIZATION ADMIN: CPT | Performed by: EMERGENCY MEDICINE

## 2024-08-09 RX ORDER — LIDOCAINE HYDROCHLORIDE 10 MG/ML
10 INJECTION, SOLUTION INFILTRATION; PERINEURAL ONCE
Status: COMPLETED | OUTPATIENT
Start: 2024-08-09 | End: 2024-08-09

## 2024-08-09 RX ORDER — ACETAMINOPHEN 500 MG
1000 TABLET ORAL ONCE
Status: COMPLETED | OUTPATIENT
Start: 2024-08-09 | End: 2024-08-09

## 2024-08-09 RX ADMIN — LIDOCAINE HYDROCHLORIDE 10 ML: 10 INJECTION, SOLUTION INFILTRATION; PERINEURAL at 03:00

## 2024-08-09 RX ADMIN — SODIUM CHLORIDE 1000 ML: 9 INJECTION, SOLUTION INTRAVENOUS at 01:51

## 2024-08-09 RX ADMIN — ACETAMINOPHEN 1000 MG: 500 TABLET, FILM COATED ORAL at 01:55

## 2024-08-09 RX ADMIN — TETANUS TOXOID, REDUCED DIPHTHERIA TOXOID AND ACELLULAR PERTUSSIS VACCINE, ADSORBED 0.5 ML: 5; 2.5; 8; 8; 2.5 SUSPENSION INTRAMUSCULAR at 01:51

## 2024-08-19 ENCOUNTER — HOSPITAL ENCOUNTER (EMERGENCY)
Facility: HOSPITAL | Age: 69
Discharge: HOME | End: 2024-08-19
Attending: EMERGENCY MEDICINE
Payer: MEDICARE

## 2024-08-19 ENCOUNTER — APPOINTMENT (OUTPATIENT)
Dept: GENERAL RADIOLOGY | Facility: HOSPITAL | Age: 69
End: 2024-08-19
Payer: MEDICARE

## 2024-08-19 VITALS
BODY MASS INDEX: 27.42 KG/M2 | WEIGHT: 237 LBS | SYSTOLIC BLOOD PRESSURE: 120 MMHG | RESPIRATION RATE: 16 BRPM | HEIGHT: 78 IN | TEMPERATURE: 98.1 F | OXYGEN SATURATION: 97 % | DIASTOLIC BLOOD PRESSURE: 94 MMHG | HEART RATE: 76 BPM

## 2024-08-19 DIAGNOSIS — S51.012A LACERATION OF LEFT ELBOW, INITIAL ENCOUNTER: ICD-10-CM

## 2024-08-19 DIAGNOSIS — M71.50 TRAUMATIC BURSITIS: Primary | ICD-10-CM

## 2024-08-19 LAB
ALBUMIN SERPL-MCNC: 4 G/DL (ref 3.5–5.2)
ALBUMIN/GLOB SERPL: 1.1 G/DL
ALP SERPL-CCNC: 95 U/L (ref 39–117)
ALT SERPL W P-5'-P-CCNC: 15 U/L (ref 1–41)
ANION GAP SERPL CALCULATED.3IONS-SCNC: 14 MMOL/L (ref 5–15)
AST SERPL-CCNC: 24 U/L (ref 1–40)
BASOPHILS # BLD AUTO: 0.04 10*3/MM3 (ref 0–0.2)
BASOPHILS NFR BLD AUTO: 0.6 % (ref 0–1.5)
BILIRUB SERPL-MCNC: 0.2 MG/DL (ref 0–1.2)
BUN SERPL-MCNC: 10 MG/DL (ref 8–23)
BUN/CREAT SERPL: 12.7 (ref 7–25)
CALCIUM SPEC-SCNC: 9.3 MG/DL (ref 8.6–10.5)
CHLORIDE SERPL-SCNC: 102 MMOL/L (ref 98–107)
CO2 SERPL-SCNC: 23 MMOL/L (ref 22–29)
CREAT SERPL-MCNC: 0.79 MG/DL (ref 0.76–1.27)
CRP SERPL-MCNC: 0.39 MG/DL (ref 0–0.5)
D-LACTATE SERPL-SCNC: 2 MMOL/L (ref 0.5–2)
DEPRECATED RDW RBC AUTO: 48.4 FL (ref 37–54)
EGFRCR SERPLBLD CKD-EPI 2021: 97.4 ML/MIN/1.73
EOSINOPHIL # BLD AUTO: 0.24 10*3/MM3 (ref 0–0.4)
EOSINOPHIL NFR BLD AUTO: 3.6 % (ref 0.3–6.2)
ERYTHROCYTE [DISTWIDTH] IN BLOOD BY AUTOMATED COUNT: 13.1 % (ref 12.3–15.4)
ERYTHROCYTE [SEDIMENTATION RATE] IN BLOOD: 70 MM/HR (ref 0–20)
GLOBULIN UR ELPH-MCNC: 3.5 GM/DL
GLUCOSE SERPL-MCNC: 117 MG/DL (ref 65–99)
HCT VFR BLD AUTO: 43.1 % (ref 37.5–51)
HGB BLD-MCNC: 14.5 G/DL (ref 13–17.7)
IMM GRANULOCYTES # BLD AUTO: 0.02 10*3/MM3 (ref 0–0.05)
IMM GRANULOCYTES NFR BLD AUTO: 0.3 % (ref 0–0.5)
LYMPHOCYTES # BLD AUTO: 2.37 10*3/MM3 (ref 0.7–3.1)
LYMPHOCYTES NFR BLD AUTO: 35.1 % (ref 19.6–45.3)
MCH RBC QN AUTO: 33.6 PG (ref 26.6–33)
MCHC RBC AUTO-ENTMCNC: 33.6 G/DL (ref 31.5–35.7)
MCV RBC AUTO: 99.8 FL (ref 79–97)
MONOCYTES # BLD AUTO: 0.95 10*3/MM3 (ref 0.1–0.9)
MONOCYTES NFR BLD AUTO: 14.1 % (ref 5–12)
NEUTROPHILS NFR BLD AUTO: 3.14 10*3/MM3 (ref 1.7–7)
NEUTROPHILS NFR BLD AUTO: 46.3 % (ref 42.7–76)
NRBC BLD AUTO-RTO: 0 /100 WBC (ref 0–0.2)
PLATELET # BLD AUTO: 332 10*3/MM3 (ref 140–450)
PMV BLD AUTO: 9.3 FL (ref 6–12)
POTASSIUM SERPL-SCNC: 4.6 MMOL/L (ref 3.5–5.2)
PROCALCITONIN SERPL-MCNC: 0.06 NG/ML (ref 0–0.25)
PROT SERPL-MCNC: 7.5 G/DL (ref 6–8.5)
RBC # BLD AUTO: 4.32 10*6/MM3 (ref 4.14–5.8)
SODIUM SERPL-SCNC: 139 MMOL/L (ref 136–145)
WBC NRBC COR # BLD AUTO: 6.76 10*3/MM3 (ref 3.4–10.8)

## 2024-08-19 PROCEDURE — 85025 COMPLETE CBC W/AUTO DIFF WBC: CPT | Performed by: EMERGENCY MEDICINE

## 2024-08-19 PROCEDURE — 85652 RBC SED RATE AUTOMATED: CPT | Performed by: EMERGENCY MEDICINE

## 2024-08-19 PROCEDURE — 86140 C-REACTIVE PROTEIN: CPT | Performed by: EMERGENCY MEDICINE

## 2024-08-19 PROCEDURE — 73080 X-RAY EXAM OF ELBOW: CPT

## 2024-08-19 PROCEDURE — 87040 BLOOD CULTURE FOR BACTERIA: CPT | Performed by: EMERGENCY MEDICINE

## 2024-08-19 PROCEDURE — 84145 PROCALCITONIN (PCT): CPT | Performed by: EMERGENCY MEDICINE

## 2024-08-19 PROCEDURE — 80053 COMPREHEN METABOLIC PANEL: CPT | Performed by: EMERGENCY MEDICINE

## 2024-08-19 PROCEDURE — 36415 COLL VENOUS BLD VENIPUNCTURE: CPT

## 2024-08-19 PROCEDURE — 83605 ASSAY OF LACTIC ACID: CPT | Performed by: EMERGENCY MEDICINE

## 2024-08-19 PROCEDURE — 99283 EMERGENCY DEPT VISIT LOW MDM: CPT

## 2024-08-19 RX ORDER — HYDROCODONE BITARTRATE AND ACETAMINOPHEN 5; 325 MG/1; MG/1
1 TABLET ORAL EVERY 8 HOURS PRN
Qty: 8 TABLET | Refills: 0 | Status: SHIPPED | OUTPATIENT
Start: 2024-08-19

## 2024-08-24 LAB
BACTERIA SPEC AEROBE CULT: NORMAL
BACTERIA SPEC AEROBE CULT: NORMAL

## 2024-09-25 ENCOUNTER — LAB (OUTPATIENT)
Dept: LAB | Facility: HOSPITAL | Age: 69
End: 2024-09-25
Payer: MEDICARE

## 2024-09-25 ENCOUNTER — OFFICE VISIT (OUTPATIENT)
Dept: FAMILY MEDICINE CLINIC | Facility: CLINIC | Age: 69
End: 2024-09-25
Payer: MEDICARE

## 2024-09-25 VITALS
DIASTOLIC BLOOD PRESSURE: 80 MMHG | SYSTOLIC BLOOD PRESSURE: 126 MMHG | OXYGEN SATURATION: 95 % | RESPIRATION RATE: 20 BRPM | TEMPERATURE: 96.4 F | BODY MASS INDEX: 27.31 KG/M2 | WEIGHT: 236 LBS | HEART RATE: 67 BPM | HEIGHT: 78 IN

## 2024-09-25 DIAGNOSIS — K22.719 BARRETT'S ESOPHAGUS WITH DYSPLASIA: ICD-10-CM

## 2024-09-25 DIAGNOSIS — E11.69 TYPE 2 DIABETES MELLITUS WITH OTHER SPECIFIED COMPLICATION, WITHOUT LONG-TERM CURRENT USE OF INSULIN: ICD-10-CM

## 2024-09-25 DIAGNOSIS — J44.9 CHRONIC OBSTRUCTIVE PULMONARY DISEASE, UNSPECIFIED COPD TYPE: ICD-10-CM

## 2024-09-25 DIAGNOSIS — Z98.890 S/P AAA (ABDOMINAL AORTIC ANEURYSM) REPAIR: ICD-10-CM

## 2024-09-25 DIAGNOSIS — E67.8 OTHER SPECIFIED HYPERALIMENTATION: ICD-10-CM

## 2024-09-25 DIAGNOSIS — Z86.79 S/P AAA (ABDOMINAL AORTIC ANEURYSM) REPAIR: ICD-10-CM

## 2024-09-25 DIAGNOSIS — Z00.00 ENCOUNTER FOR MEDICAL EXAMINATION TO ESTABLISH CARE: Primary | ICD-10-CM

## 2024-09-25 DIAGNOSIS — I10 PRIMARY HYPERTENSION: ICD-10-CM

## 2024-09-25 DIAGNOSIS — I48.0 PAF (PAROXYSMAL ATRIAL FIBRILLATION): ICD-10-CM

## 2024-09-25 DIAGNOSIS — Z95.818 PRESENCE OF WATCHMAN LEFT ATRIAL APPENDAGE CLOSURE DEVICE: ICD-10-CM

## 2024-09-25 DIAGNOSIS — M10.9 GOUT, UNSPECIFIED CAUSE, UNSPECIFIED CHRONICITY, UNSPECIFIED SITE: ICD-10-CM

## 2024-09-25 DIAGNOSIS — K21.9 GASTROESOPHAGEAL REFLUX DISEASE, UNSPECIFIED WHETHER ESOPHAGITIS PRESENT: ICD-10-CM

## 2024-09-25 DIAGNOSIS — E78.2 MIXED HYPERLIPIDEMIA: ICD-10-CM

## 2024-09-25 DIAGNOSIS — Z72.0 TOBACCO USE: ICD-10-CM

## 2024-09-25 DIAGNOSIS — F51.01 PRIMARY INSOMNIA: ICD-10-CM

## 2024-09-25 LAB
ALBUMIN SERPL-MCNC: 4.4 G/DL (ref 3.5–5)
ALBUMIN/GLOB SERPL: 1.3 G/DL (ref 1.1–2.5)
ALP SERPL-CCNC: 80 U/L (ref 24–120)
ALT SERPL W P-5'-P-CCNC: 26 U/L (ref 0–50)
ANION GAP SERPL CALCULATED.3IONS-SCNC: 13 MMOL/L (ref 4–13)
AST SERPL-CCNC: 31 U/L (ref 7–45)
AUTO MIXED CELLS #: 1.3 10*3/MM3 (ref 0.1–2.6)
AUTO MIXED CELLS %: 18.2 % (ref 0.1–24)
BILIRUB SERPL-MCNC: 0.5 MG/DL (ref 0.1–1)
BUN SERPL-MCNC: 11 MG/DL (ref 5–21)
BUN/CREAT SERPL: 13.4
CALCIUM SPEC-SCNC: 9.5 MG/DL (ref 8.6–10.5)
CHLORIDE SERPL-SCNC: 104 MMOL/L (ref 98–110)
CHOLEST SERPL-MCNC: 160 MG/DL (ref 130–200)
CO2 SERPL-SCNC: 23 MMOL/L (ref 24–31)
CREAT SERPL-MCNC: 0.82 MG/DL (ref 0.5–1.4)
EGFRCR SERPLBLD CKD-EPI 2021: 96.3 ML/MIN/1.73
ERYTHROCYTE [DISTWIDTH] IN BLOOD BY AUTOMATED COUNT: 13.6 % (ref 12.3–15.4)
GLOBULIN UR ELPH-MCNC: 3.4 GM/DL
GLUCOSE SERPL-MCNC: 96 MG/DL (ref 70–100)
HBA1C MFR BLD: 5.9 % (ref 4.8–5.9)
HCT VFR BLD AUTO: 48.3 % (ref 37.5–51)
HDLC SERPL-MCNC: 39 MG/DL
HGB BLD-MCNC: 15.8 G/DL (ref 13–17.7)
LDLC SERPL CALC-MCNC: 63 MG/DL (ref 0–99)
LDLC/HDLC SERPL: 1.19 {RATIO}
LYMPHOCYTES # BLD AUTO: 2.7 10*3/MM3 (ref 0.7–3.1)
LYMPHOCYTES NFR BLD AUTO: 37.4 % (ref 19.6–45.3)
MCH RBC QN AUTO: 33 PG (ref 26.6–33)
MCHC RBC AUTO-ENTMCNC: 32.7 G/DL (ref 31.5–35.7)
MCV RBC AUTO: 100.8 FL (ref 79–97)
NEUTROPHILS NFR BLD AUTO: 3.1 10*3/MM3 (ref 1.7–7)
NEUTROPHILS NFR BLD AUTO: 44.4 % (ref 42.7–76)
PLATELET # BLD AUTO: 243 10*3/MM3 (ref 140–450)
PMV BLD AUTO: 8.8 FL (ref 6–12)
POTASSIUM SERPL-SCNC: 4.3 MMOL/L (ref 3.5–5.3)
PROT SERPL-MCNC: 7.8 G/DL (ref 6.3–8.7)
RBC # BLD AUTO: 4.79 10*6/MM3 (ref 4.14–5.8)
SODIUM SERPL-SCNC: 140 MMOL/L (ref 135–145)
TRIGL SERPL-MCNC: 373 MG/DL (ref 0–149)
VLDLC SERPL-MCNC: 58 MG/DL (ref 5–40)
WBC NRBC COR # BLD AUTO: 7.1 10*3/MM3 (ref 3.4–10.8)

## 2024-09-25 PROCEDURE — 36415 COLL VENOUS BLD VENIPUNCTURE: CPT

## 2024-09-25 PROCEDURE — 82746 ASSAY OF FOLIC ACID SERUM: CPT

## 2024-09-25 PROCEDURE — 80053 COMPREHEN METABOLIC PANEL: CPT

## 2024-09-25 PROCEDURE — 84550 ASSAY OF BLOOD/URIC ACID: CPT

## 2024-09-25 PROCEDURE — 80061 LIPID PANEL: CPT

## 2024-09-25 PROCEDURE — 83036 HEMOGLOBIN GLYCOSYLATED A1C: CPT

## 2024-09-25 PROCEDURE — 3079F DIAST BP 80-89 MM HG: CPT | Performed by: STUDENT IN AN ORGANIZED HEALTH CARE EDUCATION/TRAINING PROGRAM

## 2024-09-25 PROCEDURE — 82607 VITAMIN B-12: CPT

## 2024-09-25 PROCEDURE — 99205 OFFICE O/P NEW HI 60 MIN: CPT | Performed by: STUDENT IN AN ORGANIZED HEALTH CARE EDUCATION/TRAINING PROGRAM

## 2024-09-25 PROCEDURE — 85025 COMPLETE CBC W/AUTO DIFF WBC: CPT

## 2024-09-25 PROCEDURE — 3074F SYST BP LT 130 MM HG: CPT | Performed by: STUDENT IN AN ORGANIZED HEALTH CARE EDUCATION/TRAINING PROGRAM

## 2024-09-25 PROCEDURE — 84443 ASSAY THYROID STIM HORMONE: CPT

## 2024-09-25 PROCEDURE — 3044F HG A1C LEVEL LT 7.0%: CPT | Performed by: STUDENT IN AN ORGANIZED HEALTH CARE EDUCATION/TRAINING PROGRAM

## 2024-09-25 PROCEDURE — 82306 VITAMIN D 25 HYDROXY: CPT

## 2024-09-25 RX ORDER — ALBUTEROL SULFATE 0.83 MG/ML
2.5 SOLUTION RESPIRATORY (INHALATION) EVERY 4 HOURS PRN
Qty: 3 ML | Refills: 2 | Status: SHIPPED | OUTPATIENT
Start: 2024-09-25

## 2024-09-26 ENCOUNTER — LAB (OUTPATIENT)
Dept: LAB | Facility: HOSPITAL | Age: 69
End: 2024-09-26
Payer: MEDICARE

## 2024-09-26 LAB
25(OH)D3 SERPL-MCNC: 29.5 NG/ML (ref 30–100)
FOLATE SERPL-MCNC: 6.68 NG/ML (ref 4.78–24.2)
TSH SERPL DL<=0.05 MIU/L-ACNC: 1.73 UIU/ML (ref 0.27–4.2)
URATE SERPL-MCNC: 4.8 MG/DL (ref 3.4–7)
VIT B12 BLD-MCNC: 328 PG/ML (ref 211–946)

## 2024-09-26 PROCEDURE — 82570 ASSAY OF URINE CREATININE: CPT

## 2024-09-26 PROCEDURE — 82043 UR ALBUMIN QUANTITATIVE: CPT

## 2024-09-27 LAB
ALBUMIN UR-MCNC: <1.2 MG/DL
CREAT UR-MCNC: 86.8 MG/DL
MICROALBUMIN/CREAT UR: NORMAL MG/G{CREAT}

## 2024-10-09 ENCOUNTER — OFFICE VISIT (OUTPATIENT)
Dept: FAMILY MEDICINE CLINIC | Facility: CLINIC | Age: 69
End: 2024-10-09
Payer: MEDICARE

## 2024-10-09 VITALS
RESPIRATION RATE: 20 BRPM | BODY MASS INDEX: 27.07 KG/M2 | SYSTOLIC BLOOD PRESSURE: 106 MMHG | HEART RATE: 99 BPM | DIASTOLIC BLOOD PRESSURE: 68 MMHG | OXYGEN SATURATION: 96 % | WEIGHT: 234 LBS | HEIGHT: 78 IN | TEMPERATURE: 98.3 F

## 2024-10-09 DIAGNOSIS — K22.719 BARRETT'S ESOPHAGUS WITH DYSPLASIA: ICD-10-CM

## 2024-10-09 DIAGNOSIS — F51.01 PRIMARY INSOMNIA: ICD-10-CM

## 2024-10-09 DIAGNOSIS — E55.9 VITAMIN D DEFICIENCY: ICD-10-CM

## 2024-10-09 DIAGNOSIS — Z12.11 COLON CANCER SCREENING: Primary | ICD-10-CM

## 2024-10-09 DIAGNOSIS — M1A.09X0 CHRONIC GOUT OF MULTIPLE SITES, UNSPECIFIED CAUSE: ICD-10-CM

## 2024-10-09 DIAGNOSIS — E78.2 MIXED HYPERLIPIDEMIA: ICD-10-CM

## 2024-10-09 DIAGNOSIS — R73.03 PREDIABETES: ICD-10-CM

## 2024-10-09 DIAGNOSIS — K21.9 GASTROESOPHAGEAL REFLUX DISEASE, UNSPECIFIED WHETHER ESOPHAGITIS PRESENT: ICD-10-CM

## 2024-10-09 PROCEDURE — 3078F DIAST BP <80 MM HG: CPT | Performed by: STUDENT IN AN ORGANIZED HEALTH CARE EDUCATION/TRAINING PROGRAM

## 2024-10-09 PROCEDURE — 3074F SYST BP LT 130 MM HG: CPT | Performed by: STUDENT IN AN ORGANIZED HEALTH CARE EDUCATION/TRAINING PROGRAM

## 2024-10-09 PROCEDURE — 3044F HG A1C LEVEL LT 7.0%: CPT | Performed by: STUDENT IN AN ORGANIZED HEALTH CARE EDUCATION/TRAINING PROGRAM

## 2024-10-09 PROCEDURE — 90662 IIV NO PRSV INCREASED AG IM: CPT | Performed by: STUDENT IN AN ORGANIZED HEALTH CARE EDUCATION/TRAINING PROGRAM

## 2024-10-09 PROCEDURE — G0439 PPPS, SUBSEQ VISIT: HCPCS | Performed by: STUDENT IN AN ORGANIZED HEALTH CARE EDUCATION/TRAINING PROGRAM

## 2024-10-09 PROCEDURE — 1125F AMNT PAIN NOTED PAIN PRSNT: CPT | Performed by: STUDENT IN AN ORGANIZED HEALTH CARE EDUCATION/TRAINING PROGRAM

## 2024-10-09 PROCEDURE — 99214 OFFICE O/P EST MOD 30 MIN: CPT | Performed by: STUDENT IN AN ORGANIZED HEALTH CARE EDUCATION/TRAINING PROGRAM

## 2024-10-09 PROCEDURE — G0008 ADMIN INFLUENZA VIRUS VAC: HCPCS | Performed by: STUDENT IN AN ORGANIZED HEALTH CARE EDUCATION/TRAINING PROGRAM

## 2024-10-09 RX ORDER — ZOLPIDEM TARTRATE 6.25 MG/1
6.25 TABLET, FILM COATED, EXTENDED RELEASE ORAL NIGHTLY PRN
Qty: 30 TABLET | Refills: 1 | Status: SHIPPED | OUTPATIENT
Start: 2024-10-09

## 2024-11-07 ENCOUNTER — TELEPHONE (OUTPATIENT)
Dept: CARDIOLOGY | Facility: CLINIC | Age: 69
End: 2024-11-07
Payer: MEDICARE

## 2024-11-07 NOTE — TELEPHONE ENCOUNTER
Called to speak with Mr. Jones to confirm he has filled his Eliquis. I instructed patient to begin taking Eliquis twice daily without any missed doses on Nov 13th, 3 weeks prior to his ablation. Verbalized underdstanding

## 2024-11-08 ENCOUNTER — OFFICE VISIT (OUTPATIENT)
Dept: FAMILY MEDICINE CLINIC | Facility: CLINIC | Age: 69
End: 2024-11-08
Payer: MEDICARE

## 2024-11-08 VITALS
SYSTOLIC BLOOD PRESSURE: 128 MMHG | RESPIRATION RATE: 15 BRPM | BODY MASS INDEX: 28.11 KG/M2 | OXYGEN SATURATION: 96 % | HEART RATE: 74 BPM | WEIGHT: 243 LBS | DIASTOLIC BLOOD PRESSURE: 76 MMHG | HEIGHT: 78 IN | TEMPERATURE: 97.9 F

## 2024-11-08 DIAGNOSIS — F51.01 PRIMARY INSOMNIA: Primary | ICD-10-CM

## 2024-11-08 PROCEDURE — 3078F DIAST BP <80 MM HG: CPT | Performed by: STUDENT IN AN ORGANIZED HEALTH CARE EDUCATION/TRAINING PROGRAM

## 2024-11-08 PROCEDURE — 1125F AMNT PAIN NOTED PAIN PRSNT: CPT | Performed by: STUDENT IN AN ORGANIZED HEALTH CARE EDUCATION/TRAINING PROGRAM

## 2024-11-08 PROCEDURE — G2211 COMPLEX E/M VISIT ADD ON: HCPCS | Performed by: STUDENT IN AN ORGANIZED HEALTH CARE EDUCATION/TRAINING PROGRAM

## 2024-11-08 PROCEDURE — 3044F HG A1C LEVEL LT 7.0%: CPT | Performed by: STUDENT IN AN ORGANIZED HEALTH CARE EDUCATION/TRAINING PROGRAM

## 2024-11-08 PROCEDURE — 99213 OFFICE O/P EST LOW 20 MIN: CPT | Performed by: STUDENT IN AN ORGANIZED HEALTH CARE EDUCATION/TRAINING PROGRAM

## 2024-11-08 PROCEDURE — 3074F SYST BP LT 130 MM HG: CPT | Performed by: STUDENT IN AN ORGANIZED HEALTH CARE EDUCATION/TRAINING PROGRAM

## 2024-11-08 RX ORDER — TRAZODONE HYDROCHLORIDE 100 MG/1
100 TABLET ORAL NIGHTLY
Qty: 30 TABLET | Refills: 2 | Status: SHIPPED | OUTPATIENT
Start: 2024-11-08

## 2024-11-08 RX ORDER — TRAZODONE HYDROCHLORIDE 50 MG/1
100 TABLET, FILM COATED ORAL
COMMUNITY
Start: 2024-10-23 | End: 2024-11-08

## 2024-11-08 NOTE — PROGRESS NOTES
"       Chief Complaint  Insomnia    Subjective        Teo Jones presents to [facility]    HPI    Insomnia  -Patient not able to get Ambien because of insurance.  Would like to try alternative.  Past Medical History:   Diagnosis Date    Abnormal ECG     Aneurysm     Arrhythmia     Asthma     Atrial fibrillation     COPD (chronic obstructive pulmonary disease)     Elevated cholesterol     GERD (gastroesophageal reflux disease)     Hyperlipidemia     Hypertension     Type 2 diabetes mellitus 12/09/2021     Past Surgical History:   Procedure Laterality Date    ABDOMINAL AORTIC ANEURYSM REPAIR WITH ENDOGRAFT N/A 11/10/2021    Procedure: ABDOMINAL AORTIC ANEURYSM REPAIR WITH ENDOGRAFT;  Surgeon: Khai Reynolds DO;  Location:  [facility];  Service: Vascular;  Laterality: N/A;    AORTAGRAM N/A 12/15/2021    Procedure: AORTOILIAC ANGIOGRAM, BILATERAL ILIAC BALLOON ANGIOPLASTY, MYNX CLOSURE;  Surgeon: Khai Reynolds DO;  Location:  [facility];  Service: Vascular;  Laterality: N/A;    APPENDECTOMY      ATRIAL APPENDAGE EXCLUSION LEFT WITH TRANSESOPHAGEAL ECHOCARDIOGRAM Right 5/9/2023    Procedure: Atrial Appendage Occlusion;  Surgeon: Ihsan Zuniga MD;  Location: [facility] CATH INVASIVE LOCATION;  Service: Cardiology;  Laterality: Right;     Social History     Socioeconomic History    Marital status:    Tobacco Use    Smoking status: Every Day     Current packs/day: 0.50     Types: Cigarettes    Smokeless tobacco: Never    Tobacco comments:     SMOKES 6-8 CIG/DAY,  ROLLS HIS OWN   Vaping Use    Vaping status: Never Used   Substance and Sexual Activity    Alcohol use: Yes     Comment: rarely    Drug use: Yes     Types: Marijuana    Sexual activity: Defer       Objective   Vital Signs:  /76   Pulse 74   Temp 97.9 °F (36.6 °C) (Temporal)   Resp 15   Ht 203.2 cm (80\")   Wt 110 kg (243 lb)   SpO2 96%   BMI 26.69 kg/m²   Estimated body mass index is " "26.69 kg/m² as calculated from the following:    Height as of this encounter: 203.2 cm (80\").    Weight as of this encounter: 110 kg (243 lb).              Physical Exam  Vitals reviewed.   Constitutional:       Appearance: Normal appearance.   HENT:      Head: Normocephalic.      Nose: Nose normal.      Mouth/Throat:      Mouth: Mucous membranes are moist.   Eyes:      Extraocular Movements: Extraocular movements intact.   Cardiovascular:      Rate and Rhythm: Normal rate and regular rhythm.      Heart sounds: Normal heart sounds.   Pulmonary:      Effort: Pulmonary effort is normal.      Breath sounds: Normal breath sounds.   Musculoskeletal:         General: Normal range of motion.      Cervical back: Normal range of motion.   Skin:     General: Skin is warm and dry.   Neurological:      General: No focal deficit present.      Mental Status: He is alert and oriented to person, place, and time.   Psychiatric:         Mood and Affect: Mood normal.         Behavior: Behavior normal.        Result Review :                   Assessment and Plan   Diagnoses and all orders for this visit:    1. Primary insomnia (Primary)  -     traZODone (DESYREL) 100 MG tablet; Take 1 tablet by mouth Every Night.  Dispense: 30 tablet; Refill: 2             EMR Dragon/Transcription disclaimer:   Much of this encounter note is an electronic transcription/translation of spoken language to printed text. The electronic translation of spoken language may permit erroneous, or at times, nonsensical words or phrases to be inadvertently transcribed; although attempts have made to review the note for such errors, some may still exist. Please excuse any unrecognized transcription errors and contact us if the air is unintelligible or needs documented correction. Also, portions of this note have been copied forward, however, changed to reflect the most current clinical status of this patient.  Follow Up   No follow-ups on file.  Patient was given " instructions and counseling regarding his condition or for health maintenance advice. Please see specific information pulled into the AVS if appropriate.

## 2024-11-18 ENCOUNTER — OFFICE VISIT (OUTPATIENT)
Dept: CARDIOLOGY | Facility: CLINIC | Age: 69
End: 2024-11-18
Payer: MEDICARE

## 2024-11-18 VITALS
BODY MASS INDEX: 28.39 KG/M2 | HEIGHT: 78 IN | OXYGEN SATURATION: 96 % | DIASTOLIC BLOOD PRESSURE: 86 MMHG | HEART RATE: 65 BPM | SYSTOLIC BLOOD PRESSURE: 124 MMHG | WEIGHT: 245.4 LBS

## 2024-11-18 DIAGNOSIS — Z86.79 S/P AAA (ABDOMINAL AORTIC ANEURYSM) REPAIR: Primary | ICD-10-CM

## 2024-11-18 DIAGNOSIS — Z95.818 PRESENCE OF WATCHMAN LEFT ATRIAL APPENDAGE CLOSURE DEVICE: ICD-10-CM

## 2024-11-18 DIAGNOSIS — Z72.0 TOBACCO USE: ICD-10-CM

## 2024-11-18 DIAGNOSIS — I48.0 PAF (PAROXYSMAL ATRIAL FIBRILLATION): ICD-10-CM

## 2024-11-18 DIAGNOSIS — Z98.890 S/P AAA (ABDOMINAL AORTIC ANEURYSM) REPAIR: Primary | ICD-10-CM

## 2024-11-18 DIAGNOSIS — I10 PRIMARY HYPERTENSION: ICD-10-CM

## 2024-11-18 PROCEDURE — 1160F RVW MEDS BY RX/DR IN RCRD: CPT | Performed by: NURSE PRACTITIONER

## 2024-11-18 PROCEDURE — 1159F MED LIST DOCD IN RCRD: CPT | Performed by: NURSE PRACTITIONER

## 2024-11-18 PROCEDURE — 93000 ELECTROCARDIOGRAM COMPLETE: CPT | Performed by: NURSE PRACTITIONER

## 2024-11-18 PROCEDURE — 99214 OFFICE O/P EST MOD 30 MIN: CPT | Performed by: NURSE PRACTITIONER

## 2024-11-18 PROCEDURE — 3074F SYST BP LT 130 MM HG: CPT | Performed by: NURSE PRACTITIONER

## 2024-11-18 PROCEDURE — 3079F DIAST BP 80-89 MM HG: CPT | Performed by: NURSE PRACTITIONER

## 2024-11-18 NOTE — PROGRESS NOTES
Subjective:     Encounter Date: 11/18/2024      Patient ID: Teo Jones is a 69 y.o. male.    Chief Complaint: Follow-up atrial fibrillation s/p Watchman device placement    History of Present Illness    The patient initially established care with Dr. Zuniga in February 2023.  He was referred for shortness of breath with activity and also due to cardiac arrhythmia that had been described when he was at pain management.  He had reportedly been placed in a monitor that showed this but there was never any saved documentation of this.  The patient was told he was in atrial fibrillation that day but was asymptomatic.  The patient felt his exertional dyspnea was due to smoking.  Ischemic evaluation was performed and nuclear stress test was low risk.  He is a smoker.  He has a history of AAA repair with Dr. Reynolds.  He did wear a 14-day Zio patch with evidence of a 5% atrial flutter burden, with heart rate around 110 when in atrial flutter.  He did not report any symptoms during these episodes.    He followed up with Dr. Zuniga in March 2023 and was complaining of nocturnal heartburn and nosebleeds with taking Eliquis.  He was taking Eliquis once daily.  Breathing had improved due to cutting back on smoking.  Given his nosebleeds and complaints of side effects with Eliquis, Watchman device implantation was discussed as an alternative to long-term anticoagulation.  The patient opted to move forward with this and had his preoperative MILY and shared decision making visit with Dr. Bland.    Ultimately, he underwent successful Watchman device placement on 5/9/2023.  Follow-up MILY in mid June revealed satisfactory findings.  See below.    I saw him in May 2024 and he reported his exertional dyspnea might have improved further.  Over the previous 2 to 3 days he was noticing blood pressure and heart rate fluctuations.  He stated his blood pressure would be very low and then very high.  He reported heart rates ranging from  80s to 130s.  He reported a syncopal spell 1 to 2 days prior.  He was unclear regarding the circumstances.  He also reported lightheadedness.  He was not having chest pain or palpitations.  He was not having orthopnea.  He was in atrial fibrillation with heart rates in the 110s.  I did check a Holter monitor and this revealed he was symptomatic to his 5% burden of A-fib.  He was referred to electrophysiology.  He also had his 1 year post watchman MILY with satisfactory findings.    He has since established care with electrophysiology and he is going to have an ablation on 12/4.  He is currently on Eliquis rather than aspirin leading up to the ablation.    Today the patient states he had another syncopal spell in August.  He does not recall the circumstances.  He states he just remembers waking up on the ground.  He has not had any further syncopal or presyncopal episodes since that time.  He denies chest pain, shortness of breath, orthopnea.  He states his exertional dyspnea is chronic and stable, perhaps slightly improved compared to last visit.  He continues to smoke.      The following portions of the patient's history were reviewed and updated as appropriate: allergies, current medications, past family history, past medical history, past social history, past surgical history and problem list.    Review of Systems   Constitutional: Negative for malaise/fatigue.   HENT:  Negative for congestion.    Cardiovascular:  Positive for dyspnea on exertion and syncope (x1 in August). Negative for chest pain, claudication, leg swelling, near-syncope, orthopnea, palpitations and paroxysmal nocturnal dyspnea.   Respiratory:  Positive for cough and wheezing.    Hematologic/Lymphatic: Does not bruise/bleed easily.   Musculoskeletal:  Negative for falls.   Gastrointestinal:  Negative for bloating.   Neurological:  Negative for dizziness, light-headedness and weakness.           Current Outpatient Medications:     albuterol  (PROVENTIL) (2.5 MG/3ML) 0.083% nebulizer solution, Take 2.5 mg by nebulization Every 4 (Four) Hours As Needed for Wheezing., Disp: 3 mL, Rfl: 2    allopurinol (ZYLOPRIM) 300 MG tablet, Take 1 tablet by mouth Every Night., Disp: , Rfl:     apixaban (ELIQUIS) 5 MG tablet tablet, Take 1 tablet by mouth 2 (Two) Times a Day., Disp: 60 tablet, Rfl: 11    atorvastatin (LIPITOR) 20 MG tablet, Take 1 tablet by mouth Every Night., Disp: , Rfl:     famotidine (PEPCID) 40 MG tablet, Take 1 tablet by mouth Every Night., Disp: , Rfl:     lisinopril (PRINIVIL,ZESTRIL) 40 MG tablet, Take 1 tablet by mouth Daily., Disp: , Rfl:     metoprolol succinate XL (TOPROL-XL) 100 MG 24 hr tablet, Take 1 tablet by mouth Daily., Disp: , Rfl:     omeprazole (priLOSEC) 40 MG capsule, Take 1 capsule by mouth Every Morning., Disp: , Rfl:     traZODone (DESYREL) 100 MG tablet, Take 1 tablet by mouth Every Night., Disp: 30 tablet, Rfl: 2       Objective:      Vitals:    11/18/24 1322   BP: 124/86   Pulse: 65   SpO2: 96%   Weight: 245 lbs    Vitals and nursing note reviewed.   Constitutional:       General: Not in acute distress.     Appearance: Well-developed and not in distress. Not diaphoretic.   Neck:      Vascular: No JVD or JVR. JVD normal.   Pulmonary:      Effort: Pulmonary effort is normal. No respiratory distress.      Breath sounds: Normal breath sounds.   Cardiovascular:      Normal rate. Irregular rhythm.      Murmurs: There is no murmur.   Edema:     Peripheral edema absent.   Abdominal:      Tenderness: There is no abdominal tenderness.   Skin:     General: Skin is warm and dry.   Neurological:      Mental Status: Alert, oriented to person, place, and time and oriented to person, place and time.         Lab Review:   Lab Results   Component Value Date    GLUCOSE 96 09/25/2024    BUN 11 09/25/2024    CREATININE 0.82 09/25/2024    EGFR 96.3 09/25/2024    BCR 13.4 09/25/2024    K 4.3 09/25/2024    CO2 23.0 (L) 09/25/2024    CALCIUM 9.5  09/25/2024    ALBUMIN 4.4 09/25/2024    BILITOT 0.5 09/25/2024    AST 31 09/25/2024    ALT 26 09/25/2024      Lab Results   Component Value Date    TSH 1.730 09/25/2024        Lab Results   Component Value Date    CHOL 160 09/25/2024    TRIG 373 (H) 09/25/2024    HDL 39 (L) 09/25/2024    LDL 63 09/25/2024            ECG 12 Lead    Date/Time: 11/18/2024 1:55 PM  Performed by: Sophia Milton APRN    Authorized by: Sophia Milton APRN  Comparison: compared with previous ECG from 5/15/2024  Comparison to previous ECG: NSR with PACs replaces afib, RVR   Rhythm: sinus rhythm  Ectopy: atrial premature contractions  BPM: 65    Clinical impression: non-specific ECG          Results for orders placed during the hospital encounter of 06/05/24    Adult Transesophageal Echo (MILY) W/ Cont if Necessary Per Protocol    Interpretation Summary    24mm Watchman FLX device well-seated, with no evidence of device related thrombus or flow through/around the device into the appendage.    Limited study to assess for the above.            LEXISCAN REPORT:        Assessment/Plan:     Problem List Items Addressed This Visit          Cardiac and Vasculature    Hypertension    S/P AAA (abdominal aortic aneurysm) repair - Primary    Overview     Added automatically from request for surgery [id number]         PAF (paroxysmal atrial fibrillation)    Overview     Initially reported when at the pain management center in early 2023, though no save documentation from that encounter to prove this.  5% burden noted on subsequent Zio patch worn in March 2023.    CHADS-VASc Risk Assessment              3 Total Score    1 Hypertension    1 DM    1 Age 65-74        Criteria that do not apply:    CHF    Age >/= 75    PRIOR STROKE/TIA/THROMBO    Vascular Disease    Sex: Female                   Presence of Watchman left atrial appendage closure device    Overview     24mm FLX implanted 5/9/23            Tobacco    Tobacco use       Recommendations/plans:    1.   Paroxysmal atrial fibrillation: He is in sinus rhythm today with PACs.  He is scheduled for ablation with Dr. Howe 12/4.  He is currently taking Eliquis leading up to the ablation.  Continue this in addition to beta-blocker for rate control with recurrences.  He may be able to transition from Eliquis back to aspirin several weeks after ablation given presence of Watchman device-defer to Dr. Howe.     At this point, I cannot identify a cardiac source for his rare episodes of syncope.  It is possible he is having conversion pauses, though none were noted when he wore his Holter monitor in May.  Continue with plans for ablation per electrophysiology.    2.  Essential hypertension: Well-controlled.    3.  Exertional dyspnea: Previous ischemic evaluation low risk for ischemia.  Therefore, symptoms likely secondary to tobacco abuse and COPD.    4.  Mixed hyperlipidemia: Followed by PCP. Continue statin.     5.  Tobacco abuse: Teo Jones  reports that he has been smoking cigarettes. He has never used smokeless tobacco.. I have educated him on the risk of diseases from using tobacco products such as cancer, COPD, and heart disease.   I advised him to quit and he is willing to quit. We have discussed the following method/s for tobacco cessation:  Cold Maceo.    I spent 4 minutes counseling the patient.    Follow-up with Dr. Zuniga in 1 year, call sooner with symptoms or concerns.        ALYSSA Morales

## 2024-11-21 DIAGNOSIS — J44.9 CHRONIC OBSTRUCTIVE PULMONARY DISEASE, UNSPECIFIED COPD TYPE: ICD-10-CM

## 2024-11-22 RX ORDER — ALBUTEROL SULFATE 0.83 MG/ML
SOLUTION RESPIRATORY (INHALATION)
Qty: 75 ML | Refills: 10 | OUTPATIENT
Start: 2024-11-22

## 2024-11-27 ENCOUNTER — HOSPITAL ENCOUNTER (OUTPATIENT)
Dept: CARDIOLOGY | Facility: HOSPITAL | Age: 69
Setting detail: HOSPITAL OUTPATIENT SURGERY
Discharge: HOME | End: 2024-11-27
Payer: MEDICARE

## 2024-11-27 LAB
ANION GAP SERPL CALCULATED.3IONS-SCNC: 11 MMOL/L (ref 5–15)
BASOPHILS # BLD AUTO: 0.04 10*3/MM3 (ref 0–0.2)
BASOPHILS NFR BLD AUTO: 0.6 % (ref 0–1.5)
BUN SERPL-MCNC: 7 MG/DL (ref 8–23)
BUN/CREAT SERPL: 8 (ref 7–25)
CALCIUM SPEC-SCNC: 8.9 MG/DL (ref 8.6–10.5)
CHLORIDE SERPL-SCNC: 105 MMOL/L (ref 98–107)
CO2 SERPL-SCNC: 24 MMOL/L (ref 22–29)
CREAT SERPL-MCNC: 0.87 MG/DL (ref 0.76–1.27)
DEPRECATED RDW RBC AUTO: 52 FL (ref 37–54)
EGFRCR SERPLBLD CKD-EPI 2021: 94.6 ML/MIN/1.73
EOSINOPHIL # BLD AUTO: 0.25 10*3/MM3 (ref 0–0.4)
EOSINOPHIL NFR BLD AUTO: 4 % (ref 0.3–6.2)
ERYTHROCYTE [DISTWIDTH] IN BLOOD BY AUTOMATED COUNT: 14.4 % (ref 12.3–15.4)
GLUCOSE SERPL-MCNC: 117 MG/DL (ref 65–99)
HCT VFR BLD AUTO: 44.3 % (ref 37.5–51)
HGB BLD-MCNC: 15 G/DL (ref 13–17.7)
IMM GRANULOCYTES # BLD AUTO: 0.01 10*3/MM3 (ref 0–0.05)
IMM GRANULOCYTES NFR BLD AUTO: 0.2 % (ref 0–0.5)
INR PPP: 1.05 (ref 0.91–1.09)
LYMPHOCYTES # BLD AUTO: 1.76 10*3/MM3 (ref 0.7–3.1)
LYMPHOCYTES NFR BLD AUTO: 27.8 % (ref 19.6–45.3)
MCH RBC QN AUTO: 33.3 PG (ref 26.6–33)
MCHC RBC AUTO-ENTMCNC: 33.9 G/DL (ref 31.5–35.7)
MCV RBC AUTO: 98.4 FL (ref 79–97)
MONOCYTES # BLD AUTO: 0.79 10*3/MM3 (ref 0.1–0.9)
MONOCYTES NFR BLD AUTO: 12.5 % (ref 5–12)
NEUTROPHILS NFR BLD AUTO: 3.47 10*3/MM3 (ref 1.7–7)
NEUTROPHILS NFR BLD AUTO: 54.9 % (ref 42.7–76)
NRBC BLD AUTO-RTO: 0 /100 WBC (ref 0–0.2)
PLATELET # BLD AUTO: 201 10*3/MM3 (ref 140–450)
PMV BLD AUTO: 9.9 FL (ref 6–12)
POTASSIUM SERPL-SCNC: 3.9 MMOL/L (ref 3.5–5.2)
PROTHROMBIN TIME: 14.1 SECONDS (ref 11.8–14.8)
RBC # BLD AUTO: 4.5 10*6/MM3 (ref 4.14–5.8)
SODIUM SERPL-SCNC: 140 MMOL/L (ref 136–145)
WBC NRBC COR # BLD AUTO: 6.32 10*3/MM3 (ref 3.4–10.8)

## 2024-11-27 PROCEDURE — 85610 PROTHROMBIN TIME: CPT | Performed by: STUDENT IN AN ORGANIZED HEALTH CARE EDUCATION/TRAINING PROGRAM

## 2024-11-27 PROCEDURE — 85025 COMPLETE CBC W/AUTO DIFF WBC: CPT | Performed by: STUDENT IN AN ORGANIZED HEALTH CARE EDUCATION/TRAINING PROGRAM

## 2024-11-27 PROCEDURE — 80048 BASIC METABOLIC PNL TOTAL CA: CPT | Performed by: STUDENT IN AN ORGANIZED HEALTH CARE EDUCATION/TRAINING PROGRAM

## 2024-11-27 PROCEDURE — 93005 ELECTROCARDIOGRAM TRACING: CPT | Performed by: STUDENT IN AN ORGANIZED HEALTH CARE EDUCATION/TRAINING PROGRAM

## 2024-12-01 LAB
QT INTERVAL: 472 MS
QTC INTERVAL: 421 MS

## 2024-12-03 ENCOUNTER — TELEPHONE (OUTPATIENT)
Dept: CARDIOLOGY | Facility: CLINIC | Age: 69
End: 2024-12-03
Payer: MEDICARE

## 2024-12-03 NOTE — DISCHARGE INSTRUCTIONS
Post-Atrial Fibrillation Ablation Instructions    ACTIVITY    Avoid driving, operating machinery, or alcohol consumption for 24 hours after receiving sedation. In addition, avoid signing legal documents or participating in legal proceedings.    You may resume normal activities after 24 hours except for the following:    Avoid heavy lifting (no more than 10 pounds) and vigorous activities for a minimum of 7 days. This includes activities such as jogging, exercise classes, sports activities, etc.    You may resume walking and other normal activities.    Avoid sitting more than 2 consecutive hours during waking hours for the first 3 days. If you are traveling, stop for brief (5 minutes) walks every two hours.    MEDICATIONS  Continue Eliquis at your usual dose this evening. Do not stop this medication without consulting with your cardiologist.    2.   Antacid: You are already on an antacid medication.  Please do not stop your antacid medication for 30 days.    MEDICAL FOLLOW UP   EP clinic follow up with Samantha Luque on 1/9/2025..    PLEASE NOTIFY US IF YOU DEVELOP    Fever of 101 or greater during your first few days at home    The occurrence of a new, persistent cough or unexplained shortness of breath.    A groin bruise may be expected. Initial soreness and discomfort should improve over the first few days. If you continue to have discomfort or if bruising is excessive, please call us.    Patients often experience palpitations and even atrial fibrillation during the healing period.    Please call if you have an episode of atrial fibrillation accompanied by fast heart rate greater than 120 beats per minute for extended period or Atrial Fibrillation that last longer than 1-2 days.    Mild chest soreness is common and may be treated with Tylenol, Advil, or Motrin.  This soreness typically worsens when taking deep breaths.  If you notice these symptoms, start one of these medications according to the package directions  and continue for 2-3 days. If your symptoms are not relieved or become severe, please call us.      REASONS TO GO TO THE EMERGENCY ROOM FOR EVALUATION:   Severe chest pain  Significant shortness of breath at rest  Near passing out or passing out episodes soon after your ablation  Symptoms of chest pain or shortness of breath associated with low blood pressure (reading less than 90 for the top number / systolic blood pressure)  Brisk bleeding or significant swelling from the groin where IVs were placed  Any concerns that an emergent or life threatening complication is occurring    If you have a clinical questions between the hours of 8am and 4pm, Monday - Friday please call the office and let us know your concern. Please leave a message if your call is not an emergency.    For emergencies, please go for evaluation at your nearest emergency department.    If you have a [employer] account, this an excellent way to communicate.  [employer] messages are checked Monday through Friday. Please allow 24-36 hours for your message to be answered.

## 2024-12-03 NOTE — TELEPHONE ENCOUNTER
I spoke with Mr. Jones about his upcoming ablation.  He was well informed about the procedure from prior discussion with Dr. Howe.  We discussed the procedure at length including risks, anesthesia, intra-op procedures, recovery, bedrest, sheath removal, discharge criteria, and normal post-procedure expectations.  I answered a few remaining questions. Mr. Jones verbalized understanding and he is ready to proceed.       Adina Cruz RN

## 2024-12-04 ENCOUNTER — ANESTHESIA (OUTPATIENT)
Dept: CARDIOLOGY | Facility: HOSPITAL | Age: 69
End: 2024-12-04
Payer: MEDICARE

## 2024-12-04 ENCOUNTER — HOSPITAL ENCOUNTER (OUTPATIENT)
Facility: HOSPITAL | Age: 69
Setting detail: HOSPITAL OUTPATIENT SURGERY
Discharge: HOME | End: 2024-12-04
Attending: STUDENT IN AN ORGANIZED HEALTH CARE EDUCATION/TRAINING PROGRAM | Admitting: STUDENT IN AN ORGANIZED HEALTH CARE EDUCATION/TRAINING PROGRAM
Payer: MEDICARE

## 2024-12-04 ENCOUNTER — ANESTHESIA EVENT (OUTPATIENT)
Dept: CARDIOLOGY | Facility: HOSPITAL | Age: 69
End: 2024-12-04
Payer: MEDICARE

## 2024-12-04 VITALS
HEART RATE: 60 BPM | WEIGHT: 241.6 LBS | SYSTOLIC BLOOD PRESSURE: 104 MMHG | TEMPERATURE: 97.5 F | BODY MASS INDEX: 27.95 KG/M2 | DIASTOLIC BLOOD PRESSURE: 75 MMHG | HEIGHT: 78 IN | OXYGEN SATURATION: 97 % | RESPIRATION RATE: 18 BRPM

## 2024-12-04 DIAGNOSIS — I48.0 PAF (PAROXYSMAL ATRIAL FIBRILLATION): ICD-10-CM

## 2024-12-04 LAB
ACT BLD: 287 SECONDS (ref 82–152)
ACT BLD: 366 SECONDS (ref 82–152)

## 2024-12-04 PROCEDURE — C1759 CATH, INTRA ECHOCARDIOGRAPHY: HCPCS | Performed by: STUDENT IN AN ORGANIZED HEALTH CARE EDUCATION/TRAINING PROGRAM

## 2024-12-04 PROCEDURE — 85347 COAGULATION TIME ACTIVATED: CPT

## 2024-12-04 PROCEDURE — 25010000002 PROPOFOL 10 MG/ML EMULSION

## 2024-12-04 PROCEDURE — C1733 CATH, EP, OTHR THAN COOL-TIP: HCPCS | Performed by: STUDENT IN AN ORGANIZED HEALTH CARE EDUCATION/TRAINING PROGRAM

## 2024-12-04 PROCEDURE — 25010000002 VASOPRESSIN 20 UNIT/ML SOLUTION

## 2024-12-04 PROCEDURE — 25010000002 HEPARIN (PORCINE) PER 1000 UNITS

## 2024-12-04 PROCEDURE — 93622 COMP EP EVAL L VENTR PAC&REC: CPT | Performed by: STUDENT IN AN ORGANIZED HEALTH CARE EDUCATION/TRAINING PROGRAM

## 2024-12-04 PROCEDURE — C1766 INTRO/SHEATH,STRBLE,NON-PEEL: HCPCS | Performed by: STUDENT IN AN ORGANIZED HEALTH CARE EDUCATION/TRAINING PROGRAM

## 2024-12-04 PROCEDURE — 25010000002 SUGAMMADEX 200 MG/2ML SOLUTION

## 2024-12-04 PROCEDURE — C1760 CLOSURE DEV, VASC: HCPCS | Performed by: STUDENT IN AN ORGANIZED HEALTH CARE EDUCATION/TRAINING PROGRAM

## 2024-12-04 PROCEDURE — C1894 INTRO/SHEATH, NON-LASER: HCPCS | Performed by: STUDENT IN AN ORGANIZED HEALTH CARE EDUCATION/TRAINING PROGRAM

## 2024-12-04 PROCEDURE — 25010000002 HEPARIN (PORCINE) 2000-0.9 UNIT/L-% SOLUTION: Performed by: STUDENT IN AN ORGANIZED HEALTH CARE EDUCATION/TRAINING PROGRAM

## 2024-12-04 PROCEDURE — 93656 COMPRE EP EVAL ABLTJ ATR FIB: CPT | Performed by: STUDENT IN AN ORGANIZED HEALTH CARE EDUCATION/TRAINING PROGRAM

## 2024-12-04 PROCEDURE — C1730 CATH, EP, 19 OR FEW ELECT: HCPCS | Performed by: STUDENT IN AN ORGANIZED HEALTH CARE EDUCATION/TRAINING PROGRAM

## 2024-12-04 PROCEDURE — C1732 CATH, EP, DIAG/ABL, 3D/VECT: HCPCS | Performed by: STUDENT IN AN ORGANIZED HEALTH CARE EDUCATION/TRAINING PROGRAM

## 2024-12-04 PROCEDURE — 25010000002 PROTAMINE SULFATE PER 10 MG

## 2024-12-04 PROCEDURE — 93005 ELECTROCARDIOGRAM TRACING: CPT | Performed by: STUDENT IN AN ORGANIZED HEALTH CARE EDUCATION/TRAINING PROGRAM

## 2024-12-04 PROCEDURE — S0260 H&P FOR SURGERY: HCPCS | Performed by: STUDENT IN AN ORGANIZED HEALTH CARE EDUCATION/TRAINING PROGRAM

## 2024-12-04 PROCEDURE — 25010000002 ONDANSETRON PER 1 MG

## 2024-12-04 PROCEDURE — 25010000002 LIDOCAINE PF 2% 2 % SOLUTION

## 2024-12-04 PROCEDURE — 25010000002 HEPARIN (PORCINE) 1000-0.9 UT/500ML-% SOLUTION: Performed by: STUDENT IN AN ORGANIZED HEALTH CARE EDUCATION/TRAINING PROGRAM

## 2024-12-04 PROCEDURE — 25010000002 LIDOCAINE 2% SOLUTION: Performed by: STUDENT IN AN ORGANIZED HEALTH CARE EDUCATION/TRAINING PROGRAM

## 2024-12-04 RX ORDER — SODIUM CHLORIDE 0.9 % (FLUSH) 0.9 %
10 SYRINGE (ML) INJECTION EVERY 12 HOURS SCHEDULED
Status: DISCONTINUED | OUTPATIENT
Start: 2024-12-04 | End: 2024-12-04 | Stop reason: HOSPADM

## 2024-12-04 RX ORDER — SODIUM CHLORIDE 0.9 % (FLUSH) 0.9 %
10 SYRINGE (ML) INJECTION AS NEEDED
Status: DISCONTINUED | OUTPATIENT
Start: 2024-12-04 | End: 2024-12-04 | Stop reason: HOSPADM

## 2024-12-04 RX ORDER — LIDOCAINE HYDROCHLORIDE 20 MG/ML
INJECTION, SOLUTION INFILTRATION; PERINEURAL
Status: DISCONTINUED | OUTPATIENT
Start: 2024-12-04 | End: 2024-12-04 | Stop reason: HOSPADM

## 2024-12-04 RX ORDER — PROPOFOL 10 MG/ML
VIAL (ML) INTRAVENOUS AS NEEDED
Status: DISCONTINUED | OUTPATIENT
Start: 2024-12-04 | End: 2024-12-04 | Stop reason: SURG

## 2024-12-04 RX ORDER — ONDANSETRON 2 MG/ML
INJECTION INTRAMUSCULAR; INTRAVENOUS AS NEEDED
Status: DISCONTINUED | OUTPATIENT
Start: 2024-12-04 | End: 2024-12-04 | Stop reason: SURG

## 2024-12-04 RX ORDER — HEPARIN SODIUM 1000 [USP'U]/ML
INJECTION, SOLUTION INTRAVENOUS; SUBCUTANEOUS AS NEEDED
Status: DISCONTINUED | OUTPATIENT
Start: 2024-12-04 | End: 2024-12-04 | Stop reason: SURG

## 2024-12-04 RX ORDER — ROCURONIUM BROMIDE 10 MG/ML
INJECTION, SOLUTION INTRAVENOUS AS NEEDED
Status: DISCONTINUED | OUTPATIENT
Start: 2024-12-04 | End: 2024-12-04 | Stop reason: SURG

## 2024-12-04 RX ORDER — HEPARIN SODIUM 200 [USP'U]/100ML
INJECTION, SOLUTION INTRAVENOUS
Status: DISCONTINUED | OUTPATIENT
Start: 2024-12-04 | End: 2024-12-04 | Stop reason: HOSPADM

## 2024-12-04 RX ORDER — LIDOCAINE HYDROCHLORIDE 20 MG/ML
INJECTION, SOLUTION EPIDURAL; INFILTRATION; INTRACAUDAL; PERINEURAL AS NEEDED
Status: DISCONTINUED | OUTPATIENT
Start: 2024-12-04 | End: 2024-12-04 | Stop reason: SURG

## 2024-12-04 RX ORDER — EPHEDRINE SULFATE 50 MG/ML
INJECTION, SOLUTION INTRAVENOUS AS NEEDED
Status: DISCONTINUED | OUTPATIENT
Start: 2024-12-04 | End: 2024-12-04 | Stop reason: SURG

## 2024-12-04 RX ORDER — BUPIVACAINE HCL/0.9 % NACL/PF 0.125 %
PLASTIC BAG, INJECTION (ML) EPIDURAL AS NEEDED
Status: DISCONTINUED | OUTPATIENT
Start: 2024-12-04 | End: 2024-12-04 | Stop reason: SURG

## 2024-12-04 RX ORDER — SODIUM CHLORIDE 9 MG/ML
40 INJECTION, SOLUTION INTRAVENOUS AS NEEDED
Status: DISCONTINUED | OUTPATIENT
Start: 2024-12-04 | End: 2024-12-04 | Stop reason: HOSPADM

## 2024-12-04 RX ORDER — PROTAMINE SULFATE 10 MG/ML
INJECTION, SOLUTION INTRAVENOUS AS NEEDED
Status: DISCONTINUED | OUTPATIENT
Start: 2024-12-04 | End: 2024-12-04 | Stop reason: SURG

## 2024-12-04 RX ADMIN — PROPOFOL 150 MCG/KG/MIN: 10 INJECTION, EMULSION INTRAVENOUS at 07:38

## 2024-12-04 RX ADMIN — Medication 100 MCG: at 08:07

## 2024-12-04 RX ADMIN — Medication 100 MCG: at 07:42

## 2024-12-04 RX ADMIN — EPHEDRINE SULFATE 15 MG: 50 INJECTION INTRAVENOUS at 08:05

## 2024-12-04 RX ADMIN — EPHEDRINE SULFATE 5 MG: 50 INJECTION INTRAVENOUS at 08:07

## 2024-12-04 RX ADMIN — ROCURONIUM BROMIDE 50 MG: 10 INJECTION, SOLUTION INTRAVENOUS at 07:33

## 2024-12-04 RX ADMIN — LIDOCAINE HYDROCHLORIDE 100 MG: 20 INJECTION, SOLUTION EPIDURAL; INFILTRATION; INTRACAUDAL; PERINEURAL at 07:33

## 2024-12-04 RX ADMIN — HEPARIN SODIUM 10000 UNITS: 1000 INJECTION, SOLUTION INTRAVENOUS; SUBCUTANEOUS at 08:19

## 2024-12-04 RX ADMIN — Medication 100 MCG: at 08:05

## 2024-12-04 RX ADMIN — PROTAMINE SULFATE 80 MG: 10 INJECTION, SOLUTION INTRAVENOUS at 08:52

## 2024-12-04 RX ADMIN — SUGAMMADEX 200 MG: 100 INJECTION, SOLUTION INTRAVENOUS at 08:51

## 2024-12-04 RX ADMIN — Medication 200 MCG: at 07:59

## 2024-12-04 RX ADMIN — ONDANSETRON 4 MG: 2 INJECTION INTRAMUSCULAR; INTRAVENOUS at 08:52

## 2024-12-04 RX ADMIN — PROPOFOL 170 MG: 10 INJECTION, EMULSION INTRAVENOUS at 07:33

## 2024-12-04 RX ADMIN — Medication 100 MCG: at 07:44

## 2024-12-04 RX ADMIN — SODIUM CHLORIDE 40 ML/HR: 9 INJECTION, SOLUTION INTRAVENOUS at 07:30

## 2024-12-04 NOTE — Clinical Note
Hemostasis started on the right femoral vein. Vascade MVP and figure 8 suturing was used in achieving hemostasis. Closure device deployed in the vessel. Hemostasis achieved successfully.

## 2024-12-04 NOTE — ANESTHESIA POSTPROCEDURE EVALUATION
"Patient: Teo Jones    Procedure Summary       Date: 12/04/24 Room / Location: [facility]ATH LAB  /  [facility] CATH INVASIVE LOCATION    Anesthesia Start: 0730 Anesthesia Stop: 0906    Procedure: Ablation atrial fibrillation - PFA Diagnosis:       PAF (paroxysmal atrial fibrillation)      (Atrial fibrillation (40476))    Providers: Sea Howe MD Provider: Tim Bocanegra CRNA    Anesthesia Type: general ASA Status: 3            Anesthesia Type: general    Vitals  Vitals Value Taken Time   /69 12/04/24 0908   Temp     Pulse 61 12/04/24 0915   Resp 26 12/04/24 0908   SpO2 95 % 12/04/24 0915   Vitals shown include unfiled device data.        Post Anesthesia Care and Evaluation    Patient location during evaluation: PHASE II  Patient participation: complete - patient participated  Level of consciousness: awake and alert  Pain management: adequate    Airway patency: patent  Anesthetic complications: No anesthetic complications  PONV Status: none  Cardiovascular status: acceptable  Respiratory status: acceptable  Hydration status: acceptable    Comments: Blood pressure 104/69, pulse 60, temperature 97.5 °F (36.4 °C), temperature source Temporal, resp. rate 26, height 203.2 cm (80\"), weight 110 kg (241 lb 9.6 oz), SpO2 93%.    No anesthesia care post op    "

## 2024-12-04 NOTE — H&P
"Chief Complaint  Atrial fibrillation    Subjective        History of Present Illness    EP Problems:  1.  Paroxysmal atrial fibrillation  2.  Presence of left atrial appendage closure device     Cardiology Problems:  1.  Abdominal aortic aneurysm status postrepair  2.  Hypertension  3.  Hyperlipidemia     Medical Problems:  1.  Epistaxis  2.  Type 2 diabetes  3.  Amaro's esophagus  4.  COPD  5.  Tobacco use disorder    Teo Jones is a 69 y.o. male with past medical history as above who presents to the hospital for outpatient EP study and ablation for treatment of atrial fibrillation.  He has a history of symptomatic AF.  We discussed treatment options in the clinic including AAD use and ablation. After risk-benefit discussion, he chose to proceed with an ablation.    Since the time of the last clinic visit, denies significant change in symptoms.  Denies missing any doses of his medications.  No new ER visits or hospitalizations.      Allergies:  Patient has no known allergies.      Objective   Vital Signs:  /96 (BP Location: Right arm, Patient Position: Lying)   Pulse 69   Temp 97.5 °F (36.4 °C) (Temporal)   Resp 19   Ht 203.2 cm (80\")   Wt 110 kg (241 lb 9.6 oz)   SpO2 95%   BMI 26.54 kg/m²   Estimated body mass index is 26.54 kg/m² as calculated from the following:    Height as of this encounter: 203.2 cm (80\").    Weight as of this encounter: 110 kg (241 lb 9.6 oz).      Physical Exam  Vitals reviewed.   Constitutional:       Appearance: Normal appearance.   Cardiovascular:      Rate and Rhythm: Normal rate and regular rhythm.      Pulses: Normal pulses.      Heart sounds: Normal heart sounds.   Pulmonary:      Effort: Pulmonary effort is normal.      Breath sounds: Normal breath sounds.   Musculoskeletal:         General: No swelling.   Neurological:      Mental Status: He is alert and oriented to person, place, and time.   Psychiatric:         Mood and Affect: Mood normal.         Judgment: " Judgment normal.            Result Review :  Labs were reviewed with relevant findings as follows: No relevant findings               Assessment and Plan   Assessment/plan:  Teo Jones is a 69 y.o. male who presents to the hospital for outpatient EP study and ablation for treatment of atrial fibrillation.  There are no apparent contraindications to proceeding and he is medically appropriate for outpatient management with this procedure.      I have previously discussed and again recapitulated risks, benefits, and alternatives of an electrophysiology study and ablation for atrial fibrillation with the patient.  Alternatives discussed include continued observation and medical management.  An electrophysiology study with ablation is an inherently high risk procedure with possible complications that include but are not limited to vascular access complications, internal bleeding, tamponade requiring pericardiocentesis or cardiac surgery, stroke, MI, embolism, myocardial injury, injury to the normal conduction system requiring a pacemaker, pulmonary vein stenosis, atrio-esophageal fistula, and ultimately death.  We also discussed that given the nature of the procedure, therapeutic efficacy can be variable.  Possibilities of recurrent arrhythmias and the possible need for additional procedures and/or medical therapy was discussed. Questions asked were appropriately answered.  No guarantees were made or implied.    Despite this, they would still like to proceed.    Plan:   - Proceed with EP study and ablation            Part of this note may be an electronic transcription/translation of spoken language to printed text using the Dragon Dictation System.

## 2024-12-04 NOTE — Clinical Note
Hemostasis started on the right femoral vein. Figure 8 suturing was used in achieving hemostasis. Closure device deployed in the vessel. Hemostasis achieved successfully. Closure device additional comment: Vascade XL

## 2024-12-04 NOTE — ANESTHESIA PREPROCEDURE EVALUATION
Anesthesia Evaluation     Patient summary reviewed and Nursing notes reviewed   NPO Solid Status: > 8 hours  NPO Liquid Status: > 8 hours           Airway   Mallampati: I  TM distance: >3 FB  Neck ROM: full  No difficulty expected  Dental - normal exam     Pulmonary    (+) a smoker Current, Smoked day of surgery, COPD moderate, asthma,  Cardiovascular   Exercise tolerance: good (4-7 METS)    ECG reviewed  PT is on anticoagulation therapy  Patient on routine beta blocker and Beta blocker given within 24 hours of surgery  Rhythm: irregular  Rate: abnormal    (+) hypertension well controlled 2 medications or greater, dysrhythmias Atrial Fib, hyperlipidemia      Neuro/Psych  (+) dizziness/light headedness  GI/Hepatic/Renal/Endo    (+) GERD, diabetes mellitus type 2 well controlled    Musculoskeletal     (+) arthralgias  Abdominal    Substance History - negative use     OB/GYN negative ob/gyn ROS         Other   arthritis,                     Anesthesia Plan    ASA 3     general     intravenous induction     Anesthetic plan, risks, benefits, and alternatives have been provided, discussed and informed consent has been obtained with: patient.    Plan discussed with CRNA.      CODE STATUS:

## 2024-12-04 NOTE — ANESTHESIA PROCEDURE NOTES
Airway  Urgency: elective    Date/Time: 12/4/2024 7:34 AM  Airway not difficult    General Information and Staff    Patient location during procedure: OR    Indications and Patient Condition  Indications for airway management: airway protection    Preoxygenated: yes  Mask difficulty assessment: 1 - vent by mask    Final Airway Details  Final airway type: endotracheal airway      Successful airway: ETT  Cuffed: yes   Successful intubation technique: direct laryngoscopy  Endotracheal tube insertion site: oral  Blade: Pineda  Blade size: 2  ETT size (mm): 7.5  Cormack-Lehane Classification: grade I - full view of glottis  Placement verified by: chest auscultation and capnometry   Measured from: teeth  ETT/EBT  to teeth (cm): 22  Number of attempts at approach: 1  Assessment: lips, teeth, and gum same as pre-op and atraumatic intubation

## 2024-12-05 LAB
QT INTERVAL: 460 MS
QTC INTERVAL: 470 MS

## 2024-12-18 NOTE — TELEPHONE ENCOUNTER
Caller: KarenMiguelotis SHER    Relationship: Self    Best call back number: [phone]     Requested Prescriptions:   Requested Prescriptions     Pending Prescriptions Disp Refills    famotidine (PEPCID) 40 MG tablet       Sig: Take 1 tablet by mouth Every Night.        Pharmacy where request should be sent: [place] [id number] [facility] [phone] [facility] [phone]      Last office visit with prescribing clinician: 11/8/2024   Last telemedicine visit with prescribing clinician: Visit date not found   Next office visit with prescribing clinician: 1/8/2025     Additional details provided by patient:     Does the patient have less than a 3 day supply:  [x] Yes  [] No    Would you like a call back once the refill request has been completed: [x] Yes [] No    If the office needs to give you a call back, can they leave a voicemail: [] Yes [] No    Juana Flores Rep   12/18/24 14:42 CST

## 2024-12-19 RX ORDER — FAMOTIDINE 40 MG/1
40 TABLET, FILM COATED ORAL NIGHTLY
Qty: 90 TABLET | Refills: 1 | Status: SHIPPED | OUTPATIENT
Start: 2024-12-19

## 2025-01-15 NOTE — PROGRESS NOTES
Chief Complaint   Patient presents with    GI Problem     Endoscopy/colonoscopy       PCP: Maurice Caraballo MD  REFER: Maurice Caraballo MD    Subjective     HPI    Teo oJnes is a 69 y.o. male who presents with known history of  Amaro's Esophagus diagnosed by biopsy (4-5 years ago) and GERD.   Heartburn and indigestion described as stable.   He is maintained on Prilosec/Omeprazole and Pepcid daily.    He does not have complaints of :N/V, no changes in bowels, no wt loss, no BRBPR.  No melena.  No abdominal pain.   No dysphagia.  He will experience heartburn if he misses multiple doses of medication.      Bowels move 1-2 times daily.  No changes.  No bright red blood per rectum, no melena.        Colonoscopy 3 years ago- [facility]-history of polyps per referral note dated 10/9/24.   Told he had crohn's disease in past.  He was treated with medication, medication stopped when on bedrest.  When he resumed medication he developed diarrhea so medication was stopped.  He states biopsy was ok on colonoscopy.  Reports diagnosis of Crohns apx 5 ago.     procedure depending review of records     Holding blood thinner if proceed with EGD        Past Medical History:   Diagnosis Date    Abnormal ECG     Aneurysm     Arrhythmia     Asthma     Atrial fibrillation     COPD (chronic obstructive pulmonary disease)     Elevated cholesterol     GERD (gastroesophageal reflux disease)     Hyperlipidemia     Hypertension     Type 2 diabetes mellitus 12/09/2021     Past Surgical History:   Procedure Laterality Date    ABDOMINAL AORTIC ANEURYSM REPAIR WITH ENDOGRAFT N/A 11/10/2021    Procedure: ABDOMINAL AORTIC ANEURYSM REPAIR WITH ENDOGRAFT;  Surgeon: Khai Reynolds DO;  Location: [facility] OR 12;  Service: Vascular;  Laterality: N/A;    AORTAGRAM N/A 12/15/2021    Procedure: AORTOILIAC ANGIOGRAM, BILATERAL ILIAC BALLOON ANGIOPLASTY, MYNX CLOSURE;  Surgeon: Khai Reynolds DO;  Location: [facility]  OR 12;  Service: Vascular;  Laterality: N/A;    APPENDECTOMY      ATRIAL APPENDAGE EXCLUSION LEFT WITH TRANSESOPHAGEAL ECHOCARDIOGRAM Right 5/9/2023    Procedure: Atrial Appendage Occlusion;  Surgeon: Ihsan Zuniga MD;  Location:  [facility] CATH INVASIVE LOCATION;  Service: Cardiology;  Laterality: Right;    CARDIAC ELECTROPHYSIOLOGY PROCEDURE N/A 12/4/2024    Procedure: Ablation atrial fibrillation - PFA;  Surgeon: Sea Howe MD;  Location:  [facility] CATH INVASIVE LOCATION;  Service: Cardiovascular;  Laterality: N/A;     Outpatient Medications Marked as Taking for the 1/16/25 encounter (Office Visit) with Delbert Shine APRN   Medication Sig Dispense Refill    albuterol (PROVENTIL) (2.5 MG/3ML) 0.083% nebulizer solution Take 2.5 mg by nebulization Every 4 (Four) Hours As Needed for Wheezing. 3 mL 2    allopurinol (ZYLOPRIM) 300 MG tablet Take 1 tablet by mouth Every Night.      apixaban (ELIQUIS) 5 MG tablet tablet Take 1 tablet by mouth 2 (Two) Times a Day. (Patient taking differently: Take 1 tablet by mouth Daily.) 60 tablet 11    atorvastatin (LIPITOR) 20 MG tablet Take 1 tablet by mouth Every Night.      famotidine (PEPCID) 40 MG tablet Take 1 tablet by mouth Every Night. 90 tablet 1    lisinopril (PRINIVIL,ZESTRIL) 40 MG tablet Take 1 tablet by mouth Daily.      metoprolol succinate XL (TOPROL-XL) 100 MG 24 hr tablet Take 1 tablet by mouth Daily.      omeprazole (priLOSEC) 40 MG capsule Take 1 capsule by mouth Every Morning.      [DISCONTINUED] traZODone (DESYREL) 100 MG tablet Take 1 tablet by mouth Every Night. 30 tablet 2     No Known Allergies  Social History     Socioeconomic History    Marital status:    Tobacco Use    Smoking status: Every Day     Current packs/day: 0.50     Types: Cigarettes    Smokeless tobacco: Never    Tobacco comments:     SMOKES 6-8 CIG/DAY,  ROLLS HIS OWN   Vaping Use    Vaping status: Never Used   Substance and Sexual Activity    Alcohol use: Yes     Comment:  "rarely    Drug use: Yes     Types: Marijuana    Sexual activity: Defer     Review of Systems   Constitutional:  Negative for fever and unexpected weight change.   HENT:  Negative for trouble swallowing.    Respiratory:  Negative for shortness of breath.    Cardiovascular:  Negative for chest pain.   Gastrointestinal:  Negative for abdominal pain and anal bleeding.     Objective   Vitals:    01/16/25 1351   BP: 150/90   Pulse: 55   Temp: 97.8 °F (36.6 °C)   SpO2: 96%   Weight: 109 kg (240 lb 3.2 oz)   Height: 203.2 cm (80\")     Physical Exam  Constitutional:       Appearance: Normal appearance. He is well-developed.   Eyes:      General: No scleral icterus.  Cardiovascular:      Heart sounds: Normal heart sounds. No murmur heard.  Pulmonary:      Effort: Pulmonary effort is normal.   Abdominal:      General: Bowel sounds are normal. There is no distension.      Palpations: Abdomen is soft.      Tenderness: There is no abdominal tenderness. There is no guarding.   Skin:     General: Skin is warm and dry.      Coloration: Skin is not jaundiced.   Neurological:      Mental Status: He is alert.   Psychiatric:         Behavior: Behavior is cooperative.       Imaging Results (Most Recent)       None          Body mass index is 26.39 kg/m².  Assessment & Plan   Diagnoses and all orders for this visit:    1. Gastroesophageal reflux disease, unspecified whether esophagitis present (Primary)  -     Case Request; Standing  -     Implement Anesthesia Orders Day of Procedure; Standing  -     Follow Anesthesia Guidelines / Protocol; Future  -     Case Request    2. Amaro's esophagus determined by biopsy  -     Case Request; Standing  -     Implement Anesthesia Orders Day of Procedure; Standing  -     Follow Anesthesia Guidelines / Protocol; Future  -     Case Request    3. History of colon polyps    4. Anticoagulated        ESOPHAGOGASTRODUODENOSCOPY WITH ANESTHESIA- (examination of lining of the esophagus, stomach, and first " part of small intestine) (N/A)        Take PPI 30 min prior to breakfast   Decrease caffeine, nicotine, etoh- all contribute to acid reflux  Do not eat 2-3 hours within lying down, elevated HOB 4-6 inches    Colonoscopy pending review of previous procedure from [facility]     Teo Jones is tolerating PPI therapy without difficulty.  Symptoms are controlled with current PPI medication and dose.  I recommend Teo Jones continue    at current dose and frequency.      Patient is to continue all blood pressure and cardiac medications prior to procedure and has been advised to take medications morning of procedure   Pt verbalized understanding    Patient is to hold their anticoagulation medication per the direction of their prescribing provider. This is to prevent any risk or complication from bleeding intra and post procedure. If they develop bleeding post procedure they are to go the emergency department for further evaluation and treatment immediately.  We will obtain clearance from prescribing provider requesting Eliquis will need to be held x 2 days prior to procedure.     The risks of the endoscopy were discussed in detail.  We discussed the risks of perforation (one out of 0324-3712, riskier with dilation), bleeding (one out of 500), and the rare risks of infection, adverse reaction to anesthesia, respiratory failure, cardiac failure including MI and adverse reaction to medications, etc.  We discussed consequences that could occur if a risk were to develop such as the need for hospitalization, blood transfusion, surgical intervention, medications, pain and disability and death.  Alternatives include not doing anything, or pursuing an UGI series which only offers a diagnosis with potential less accuracy compared to EGD.  The patient verbalizes understanding and agrees to proceed.    Delbert Shine, APRN  01/30/25      Gastroesophageal Reflux Disease, Adult    Gastroesophageal  reflux disease (GERD) happens when acid from your stomach flows up into the esophagus. When acid comes in contact with the esophagus, the acid causes soreness (inflammation) in the esophagus. Over time, GERD may create small holes (ulcers) in the lining of the esophagus.  CAUSES    Increased body weight. This puts pressure on the stomach, making acid rise from the stomach into the esophagus.  Smoking. This increases acid production in the stomach.  Drinking alcohol. This causes decreased pressure in the lower esophageal sphincter (valve or ring of muscle between the esophagus and stomach), allowing acid from the stomach into the esophagus.  Late evening meals and a full stomach. This increases pressure and acid production in the stomach.  A malformed lower esophageal sphincter.  Sometimes, no cause is found.  SYMPTOMS    Burning pain in the lower part of the mid-chest behind the breastbone and in the mid-stomach area. This may occur twice a week or more often.  Trouble swallowing.  Sore throat.  Dry cough.  Asthma-like symptoms including chest tightness, shortness of breath, or wheezing.  DIAGNOSIS    Your caregiver may be able to diagnose GERD based on your symptoms. In some cases, X-rays and other tests may be done to check for complications or to check the condition of your stomach and esophagus.  TREATMENT    Your caregiver may recommend over-the-counter or prescription medicines to help decrease acid production. Ask your caregiver before starting or adding any new medicines.    HOME CARE INSTRUCTIONS    Change the factors that you can control. Ask your caregiver for guidance concerning weight loss, quitting smoking, and alcohol consumption.  Avoid foods and drinks that make your symptoms worse, such as:  Caffeine or alcoholic drinks.  Chocolate.  Peppermint or mint flavorings.  Garlic and onions.  Spicy foods.  Citrus fruits, such as oranges, franco, or limes.  Tomato-based foods such as sauce, chili, salsa,  and pizza.  Fried and fatty foods.  Avoid lying down for the 3 hours prior to your bedtime or prior to taking a nap.  Eat small, frequent meals instead of large meals.  Wear loose-fitting clothing. Do not wear anything tight around your waist that causes pressure on your stomach.  Raise the head of your bed 6 to 8 inches with wood blocks to help you sleep. Extra pillows will not help.  Only take over-the-counter or prescription medicines for pain, discomfort, or fever as directed by your caregiver.  Do not take aspirin, ibuprofen, or other nonsteroidal anti-inflammatory drugs (NSAIDs).  SEEK IMMEDIATE MEDICAL CARE IF:    You have pain in your arms, neck, jaw, teeth, or back.  Your pain increases or changes in intensity or duration.  You develop nausea, vomiting, or sweating (diaphoresis).  You develop shortness of breath, or you faint.  Your vomit is green, yellow, black, or looks like coffee grounds or blood.  Your stool is red, bloody, or black.  These symptoms could be signs of other problems, such as heart disease, gastric bleeding, or esophageal bleeding.  MAKE SURE YOU:    Understand these instructions.  Will watch your condition.  Will get help right away if you are not doing well or get worse.     This information is not intended to replace advice given to you by your health care provider. Make sure you discuss any questions you have with your health care provider.     Document Released: 09/27/2006 Document Revised: 01/08/2016 Document Reviewed: 04/13/2016  [employer] Interactive Patient Education ©2016 [employer]

## 2025-01-16 ENCOUNTER — OFFICE VISIT (OUTPATIENT)
Dept: GASTROENTEROLOGY | Facility: CLINIC | Age: 70
End: 2025-01-16
Payer: MEDICARE

## 2025-01-16 VITALS
WEIGHT: 240.2 LBS | SYSTOLIC BLOOD PRESSURE: 150 MMHG | OXYGEN SATURATION: 96 % | HEIGHT: 78 IN | TEMPERATURE: 97.8 F | BODY MASS INDEX: 27.79 KG/M2 | HEART RATE: 55 BPM | DIASTOLIC BLOOD PRESSURE: 90 MMHG

## 2025-01-16 DIAGNOSIS — Z79.01 ANTICOAGULATED: ICD-10-CM

## 2025-01-16 DIAGNOSIS — K21.9 GASTROESOPHAGEAL REFLUX DISEASE, UNSPECIFIED WHETHER ESOPHAGITIS PRESENT: Primary | ICD-10-CM

## 2025-01-16 DIAGNOSIS — K22.70 BARRETT'S ESOPHAGUS DETERMINED BY BIOPSY: ICD-10-CM

## 2025-01-16 DIAGNOSIS — Z86.0100 HISTORY OF COLON POLYPS: ICD-10-CM

## 2025-01-17 ENCOUNTER — OFFICE VISIT (OUTPATIENT)
Dept: CARDIOLOGY | Facility: CLINIC | Age: 70
End: 2025-01-17
Payer: MEDICARE

## 2025-01-17 VITALS
DIASTOLIC BLOOD PRESSURE: 86 MMHG | SYSTOLIC BLOOD PRESSURE: 156 MMHG | WEIGHT: 240 LBS | BODY MASS INDEX: 27.77 KG/M2 | OXYGEN SATURATION: 98 % | HEART RATE: 60 BPM | HEIGHT: 78 IN

## 2025-01-17 DIAGNOSIS — Z95.818 PRESENCE OF WATCHMAN LEFT ATRIAL APPENDAGE CLOSURE DEVICE: ICD-10-CM

## 2025-01-17 DIAGNOSIS — I48.19 PERSISTENT ATRIAL FIBRILLATION: Primary | ICD-10-CM

## 2025-01-17 DIAGNOSIS — I48.0 PAF (PAROXYSMAL ATRIAL FIBRILLATION): ICD-10-CM

## 2025-01-17 NOTE — PROGRESS NOTES
"[facility] -  CLINIC FOLLOW UP     Patient Care Team:  Maurice Caraballo MD as PCP - General (Family Medicine)    Chief Complaint: follow up to ablation     Subjective   EP Problems:  1.  Paroxysmal atrial fibrillation  -12/4/24: PVI, [facility]   2.  Presence of left atrial appendage closure device     Cardiology Problems:  1.  Abdominal aortic aneurysm status postrepair  2.  Hypertension  3.  Hyperlipidemia     Medical Problems:  1.  Epistaxis  2.  Type 2 diabetes  3.  Amaro's esophagus  4.  COPD  5.  Tobacco use disorder      HPI: Today I had the pleasure of seeing Teo Jones in the cardiology clinic for follow up. He is a 69 y.o. male with problem list as above who presents to the clinic for evaluation of paroxysmal atrial fibrillation.  He was diagnosed with atrial fibrillation towards the end of 2022 with episodes of tachycardia, shortness of breath, fatigue, dizziness.  He has worn Holter monitors which have revealed recurrent episodes of paroxysmal atrial fibrillation. He opted to undergo ablation December 4, 2024 and this was performed successfully.     He also had Watchman previously and restarted Eliquis 3 weeks prior to his procedure. Now he reports he is only taking it once a day. He also needs an EGD for Amaro's surveillance and he's been asked to be off of his eliquis for 5 days for this. Patient states that he has not had any recurrence since his ablation. No complaints of headache, palpitations, or lightheadedness. He does have dizziness in the morning at times.     Objective     Visit Vitals  /86   Pulse 60   Ht 203.2 cm (80\")   Wt 109 kg (240 lb)   SpO2 98%   BMI 26.37 kg/m²           Vitals reviewed.   Constitutional:       Appearance: Healthy appearance. Not in distress.   Eyes:      Extraocular Movements: Extraocular movements intact.      Conjunctiva/sclera: Conjunctivae normal.      Pupils: Pupils are equal, round, and reactive to light.   HENT:      Head: " Normocephalic and atraumatic.      Nose: Nose normal.    Mouth/Throat:      Lips: Pink.      Mouth: Mucous membranes are moist.      Pharynx: Oropharynx is clear.   Neck:      Vascular: No carotid bruit or JVD. JVD normal.   Pulmonary:      Effort: Pulmonary effort is normal.      Breath sounds: Normal breath sounds.   Chest:      Chest wall: Not tender to palpatation.   Cardiovascular:      PMI at left midclavicular line. Normal rate. Regular rhythm. Normal S1. Normal S2.       Murmurs: There is no murmur.      No gallop.  No rub.   Pulses:     Radial: 2+ bilaterally.  Edema:     Peripheral edema absent.   Abdominal:      General: Bowel sounds are normal.      Palpations: Abdomen is soft.   Musculoskeletal: Normal range of motion.      Extremities: No clubbing present.     Cervical back: Normal range of motion. Skin:     General: Skin is warm and dry.   Neurological:      General: No focal deficit present.      Mental Status: Alert and oriented to person, place, and time.   Psychiatric:         Attention and Perception: Attention normal.         Mood and Affect: Affect normal.         Speech: Speech normal.         Behavior: Behavior normal.         Cognition and Memory: Cognition normal.           The following portions of the patient's history were reviewed and updated as appropriate: allergies, current medications, past medical history, past social history, past and problem list.     Review of Systems   Constitutional: Negative.    HENT: Negative.     Eyes: Negative.    Respiratory: Negative.     Cardiovascular: Negative.    Gastrointestinal: Negative.    Endocrine: Negative.    Genitourinary: Negative.    Musculoskeletal: Negative.    Skin: Negative.    Allergic/Immunologic: Negative.    Neurological: Negative.    Hematological: Negative.    Psychiatric/Behavioral: Negative.                ECG 12 Lead    Date/Time: 1/17/2025 11:45 AM  Performed by: Samantha Luque PA    Authorized by: Samantha Luque PA   "Comparison: compared with previous ECG from 12/5/2024  Rhythm: sinus bradycardia  Rate: normal  BPM: 55  Conduction: 1st degree AV block  QRS axis: normal  Other findings: non-specific ST-T wave changes    Clinical impression: abnormal EKG            Medication Review: yes    Current Outpatient Medications:     albuterol (PROVENTIL) (2.5 MG/3ML) 0.083% nebulizer solution, Take 2.5 mg by nebulization Every 4 (Four) Hours As Needed for Wheezing., Disp: 3 mL, Rfl: 2    allopurinol (ZYLOPRIM) 300 MG tablet, Take 1 tablet by mouth Every Night., Disp: , Rfl:     apixaban (ELIQUIS) 5 MG tablet tablet, Take 1 tablet by mouth 2 (Two) Times a Day. (Patient taking differently: Take 1 tablet by mouth Daily.), Disp: 60 tablet, Rfl: 11    atorvastatin (LIPITOR) 20 MG tablet, Take 1 tablet by mouth Every Night., Disp: , Rfl:     famotidine (PEPCID) 40 MG tablet, Take 1 tablet by mouth Every Night., Disp: 90 tablet, Rfl: 1    lisinopril (PRINIVIL,ZESTRIL) 40 MG tablet, Take 1 tablet by mouth Daily., Disp: , Rfl:     metoprolol succinate XL (TOPROL-XL) 100 MG 24 hr tablet, Take 1 tablet by mouth Daily., Disp: , Rfl:     omeprazole (priLOSEC) 40 MG capsule, Take 1 capsule by mouth Every Morning., Disp: , Rfl:     traZODone (DESYREL) 100 MG tablet, Take 1 tablet by mouth Every Night., Disp: 30 tablet, Rfl: 2   No Known Allergies    I have reviewed       CBC:  Lab Results - Last 18 Months   Lab Units 11/27/24  1021   WBC 10*3/mm3 6.32   HEMOGLOBIN g/dL 15.0   HEMATOCRIT % 44.3   PLATELETS 10*3/mm3 201      BMP/CMP:  Lab Results - Last 18 Months   Lab Units 11/27/24  1021   SODIUM mmol/L 140   POTASSIUM mmol/L 3.9   CHLORIDE mmol/L 105   CO2 mmol/L 24.0   GLUCOSE mg/dL 117*   BUN mg/dL 7*   CREATININE mg/dL 0.87   CALCIUM mg/dL 8.9     BNP: No results for input(s): \"PROBNP\" in the last 04748 hours.   THYROID:  Lab Results - Last 18 Months   Lab Units 09/25/24  1438   TSH uIU/mL 1.730       Results for orders placed during the hospital " encounter of 06/05/24    Adult Transesophageal Echo (MILY) W/ Cont if Necessary Per Protocol    Interpretation Summary    24mm Watchman FLX device well-seated, with no evidence of device related thrombus or flow through/around the device into the appendage.    Limited study to assess for the above.     Assessment:   Diagnoses and all orders for this visit:    1. Persistent atrial fibrillation (Primary)  -     ECG 12 Lead    2. PAF (paroxysmal atrial fibrillation)  Overview:  Initially reported when at the pain management center in early 2023, though no save documentation from that encounter to prove this.  5% burden noted on subsequent Zio patch worn in March 2023.    CHADS-VASc Risk Assessment              3 Total Score    1 Hypertension    1 DM    1 Age 65-74        Criteria that do not apply:    CHF    Age >/= 75    PRIOR STROKE/TIA/THROMBO    Vascular Disease    Sex: Female                3. Presence of Watchman left atrial appendage closure device  Overview:  24mm FLX implanted 5/9/23      Afib/Watchman: Continue Metoprolol  - HR 50-60 today. Consider decreasing Metoprolol if morning dizziness persists.  - Continue eliquis until 90 days post ablation (March)  - Educated patient on importance of taking eliquis twice daily to reduce risk for blood clot/stroke  - Preferably schedule EGD for after March once patient can come off eliquis post ablation    I spent 30 minutes caring for Edward on this date of service. This time includes time spent by me in the following activities:preparing for the visit, reviewing tests, obtaining and/or reviewing a separately obtained history, performing a medically appropriate examination and/or evaluation , counseling and educating the patient/family/caregiver, ordering medications, tests, or procedures, referring and communicating with other health care professionals , documenting information in the medical record, and independently interpreting results and communicating that  information with the patient/family/caregiver      Electronically signed by AGUSTO aPrks

## 2025-01-23 ENCOUNTER — HOSPITAL ENCOUNTER (OUTPATIENT)
Dept: CT IMAGING | Facility: HOSPITAL | Age: 70
Discharge: HOME | End: 2025-01-23
Admitting: NURSE PRACTITIONER
Payer: MEDICARE

## 2025-01-23 ENCOUNTER — TELEPHONE (OUTPATIENT)
Dept: GASTROENTEROLOGY | Facility: CLINIC | Age: 70
End: 2025-01-23
Payer: MEDICARE

## 2025-01-23 ENCOUNTER — OFFICE VISIT (OUTPATIENT)
Dept: VASCULAR SURGERY | Facility: CLINIC | Age: 70
End: 2025-01-23
Payer: MEDICARE

## 2025-01-23 VITALS
OXYGEN SATURATION: 97 % | HEIGHT: 78 IN | BODY MASS INDEX: 27.42 KG/M2 | DIASTOLIC BLOOD PRESSURE: 80 MMHG | HEART RATE: 55 BPM | SYSTOLIC BLOOD PRESSURE: 142 MMHG | WEIGHT: 237 LBS

## 2025-01-23 DIAGNOSIS — I71.40 ABDOMINAL AORTIC ANEURYSM (AAA) GREATER THAN 5.5 CM IN DIAMETER IN MALE: Primary | ICD-10-CM

## 2025-01-23 DIAGNOSIS — I73.9 PAD (PERIPHERAL ARTERY DISEASE): ICD-10-CM

## 2025-01-23 DIAGNOSIS — I65.23 BILATERAL CAROTID ARTERY STENOSIS: ICD-10-CM

## 2025-01-23 DIAGNOSIS — I71.40 ABDOMINAL AORTIC ANEURYSM (AAA) GREATER THAN 5.5 CM IN DIAMETER IN MALE: ICD-10-CM

## 2025-01-23 DIAGNOSIS — Z72.0 TOBACCO USE: ICD-10-CM

## 2025-01-23 DIAGNOSIS — I10 PRIMARY HYPERTENSION: ICD-10-CM

## 2025-01-23 DIAGNOSIS — E78.5 HYPERLIPIDEMIA, UNSPECIFIED HYPERLIPIDEMIA TYPE: ICD-10-CM

## 2025-01-23 PROCEDURE — 1160F RVW MEDS BY RX/DR IN RCRD: CPT | Performed by: NURSE PRACTITIONER

## 2025-01-23 PROCEDURE — 1159F MED LIST DOCD IN RCRD: CPT | Performed by: NURSE PRACTITIONER

## 2025-01-23 PROCEDURE — 3077F SYST BP >= 140 MM HG: CPT | Performed by: NURSE PRACTITIONER

## 2025-01-23 PROCEDURE — 99214 OFFICE O/P EST MOD 30 MIN: CPT | Performed by: NURSE PRACTITIONER

## 2025-01-23 PROCEDURE — 3079F DIAST BP 80-89 MM HG: CPT | Performed by: NURSE PRACTITIONER

## 2025-01-23 PROCEDURE — 25510000001 IOPAMIDOL PER 1 ML: Performed by: NURSE PRACTITIONER

## 2025-01-23 PROCEDURE — 74174 CTA ABD&PLVS W/CONTRAST: CPT

## 2025-01-23 RX ORDER — IOPAMIDOL 755 MG/ML
100 INJECTION, SOLUTION INTRAVASCULAR
Status: COMPLETED | OUTPATIENT
Start: 2025-01-23 | End: 2025-01-23

## 2025-01-23 RX ADMIN — IOPAMIDOL 100 ML: 755 INJECTION, SOLUTION INTRAVENOUS at 09:00

## 2025-01-23 NOTE — LETTER
February 10, 2025     Maurice Caraballo MD  [address]  [facility] [address]    Patient: Teo Jones   YOB: 1955   Date of Visit: 1/23/2025     Dear Maurice Caraballo MD:       Thank you for referring Teo Jones to me for evaluation. Below are the relevant portions of my assessment and plan of care.    If you have questions, please do not hesitate to call me. I look forward to following Teo along with you.         Sincerely,        ALYSSA Blackwell        CC: No Recipients    Marlene Shankar APRN  02/10/25 1250  Sign when Signing Visit  01/23/2025       Maurice Caraballo MD  [address]  [facility] [address]    Teo Jones  1955    Chief Complaint   Patient presents with   • Follow-up     1 year follow up w/ testing. Last seen 1/11/24. Patient denies any stroke type symptoms or new/worsening back/abdominal pains. Meds were reviewed from physical list given by patient.        Dear Maurice Caraballo MD   I had the pleasure of seeing your patient Teo Jones in the office today.  As you recall, Teo Jones is a 69 y.o.  male who we are following for an abdominal aortic aneurysm.  He was initially being evaluated for back pain and found to have a very large infrarenal abdominal aortic aneurysm.  He did undergo endovascular abdominal aortic aneurysm repair on 11/10/2021.  Postoperatively it appears one of his limbs was kinked at the flow divider.  He underwent an angiogram with kissing balloon angioplasty on 12/15/2021.  Currently he is doing well and denies any significant abdominal or back pain.  Unfortunately he is a current smoker.  He did have noninvasive testing performed which I did review in office.        Review of Systems   Constitutional: Negative.    HENT: Negative.     Eyes: Negative.    Respiratory: Negative.     Cardiovascular: Negative.    Gastrointestinal: Negative.    Endocrine: Negative.    Genitourinary: Negative.    Musculoskeletal: Negative.   "Positive for back pain (chronic).   Skin: Negative.    Allergic/Immunologic: Negative.    Neurological: Negative.    Hematological: Negative.    Psychiatric/Behavioral: Negative.     All other systems reviewed and are negative.       /80   Pulse 55   Ht 203.2 cm (80\")   Wt 108 kg (237 lb)   SpO2 97%   BMI 26.04 kg/m²   Physical Exam  Vitals and nursing note reviewed.   Constitutional:       Appearance: Normal appearance. He is well-developed, well-groomed and overweight.   HENT:      Head: Normocephalic and atraumatic.   Eyes:      General: No scleral icterus.     Pupils: Pupils are equal, round, and reactive to light.   Neck:      Thyroid: No thyromegaly.   Cardiovascular:      Rate and Rhythm: Normal rate and regular rhythm.      Heart sounds: Normal heart sounds.   Pulmonary:      Effort: Pulmonary effort is normal.      Breath sounds: Normal breath sounds.   Abdominal:      General: Bowel sounds are normal.      Palpations: Abdomen is soft.   Musculoskeletal:      Cervical back: Normal range of motion and neck supple.      Comments: Chronic back pain   Skin:     General: Skin is warm and dry.   Neurological:      Mental Status: He is alert and oriented to person, place, and time.   Psychiatric:         Mood and Affect: Mood normal.         Behavior: Behavior normal. Behavior is cooperative.         Thought Content: Thought content normal.         Judgment: Judgment normal.             Diagnostic data:    CT Angiogram Abdomen Pelvis    Result Date: 1/23/2025  Narrative: CT ANGIOGRAM ABDOMEN PELVIS- 1/23/2025 7:20 AM  HISTORY: Aortic aneurysm (AAA), post-op; I71.40-Abdominal aortic aneurysm, without rupture, unspecified  COMPARISON: 1/11/2024  TOTAL DOSE LENGTH PRODUCT: 1706.63 mGy.cm. Automated exposure control was also utilized to decrease patient radiation dose.  TECHNIQUE: Axial images of the abdomen pelvis are obtained pre and post IV contrast. 2D, 3D, MIPS reconstructed images are reviewed.  " FINDINGS: Endovascular repair of the infrarenal abdominal aortic aneurysm. No visible endoleak. Aneurysm sac is stable in size 6 cm in greatest diameter. Iliac limbs are patent. The celiac trunk, superior mesenteric, bilateral renal arteries remain patent.  Similar moderate size hiatal hernia. Visible lung bases are unremarkable. Mild cardiomegaly.  Mild hepatic steatosis. No suspicious focal liver or splenic mass. No pancreatic cyst or mass. Gallbladder contracted. No suspicious adrenal nodule. Right renal cyst. No enhancing renal mass. No hydronephrosis. Bladder wall thickening may relate to under distention. No prostate enlargement.  No free intraperitoneal air or loculated abscess collection. No bowel obstruction. Incidental small fatty containing umbilical hernia. No pathologic lymphadenopathy.  Old healed inferior right pelvic fractures. Degenerative changes thoracolumbar spine. Grade 1 spondylolisthesis of L3 over L4. No focal aggressive regional bony lesions identified.      Impression:  1. Stable endovascular repair of the infrarenal abdominal aortic aneurysm with no evidence of endoleak. Aneurysm sac similar in size measuring 6 cm. 2. Similar moderate size hiatal hernia. 3. Mild hepatic steatosis. 4. Diffuse bladder wall thickening likely due to under distention. Correlation for signs of cystitis recommended.  This report was signed and finalized on 1/23/2025 3:23 PM by Dr. Rosangela Katz MD.          Patient Active Problem List   Diagnosis   • Hypertension   • Hyperlipidemia   • Tobacco use   • Abdominal aortic aneurysm (AAA) greater than 5.5 cm in diameter in male   • Acute bronchitis   • Arthritis   • Amaro's esophagus   • Chest pain   • COPD (chronic obstructive pulmonary disease)   • Dizziness   • Foreign body   • Prediabetes   • Gout   • Abdominal aortic aneurysm (AAA)   • Stenosis of other vascular prosthetic devices, implants and grafts, initial encounter   • S/P AAA (abdominal aortic aneurysm)  repair   • PAF (paroxysmal atrial fibrillation)   • Epistaxis   • Presence of Watchman left atrial appendage closure device   • Gastroesophageal reflux disease   • Amaro's esophagus determined by biopsy        Diagnosis Plan   1. Abdominal aortic aneurysm (AAA) greater than 5.5 cm in diameter in male  CT Angiogram Abdomen Pelvis      2. Bilateral carotid artery stenosis  US Carotid Bilateral      3. Hyperlipidemia, unspecified hyperlipidemia type        4. Primary hypertension        5. Tobacco use        6. PAD (peripheral artery disease)  US Ankle / Brachial Indices Extremity Complete              Plan: After thoroughly evaluating Teo Jones, I believe the best course of action is to remain conservative from vascular surgery standpoint.  I did review his testing and aneurysm is stable measuring about 6 cm which is unchanged.  I do not see any evidence of endoleak.  We will see him back in 1 year with repeat noninvasive testing including a CTA of the abdomen pelvis, carotid duplex, and screening ABIs.  He does have some leg weakness believe this is more in relation to his lumbar spine.  I did discuss vascular risk factors as they pertain to the progression of vascular disease including controlling his hypertension, hyperlipidemia, and smoking cessation.  His blood pressure is slightly elevated at 142/80.  He should continue to monitor and discuss further with his primary care provider.  He should continue his Eliquis 5 mg twice daily and Lipitor 20 mg daily in addition to his other medications.  Unfortunately, he is a current daily smoker and does not wish to quit smoking at this time. Body mass index is 26.04 kg/m².    This was all discussed in full with complete understanding.  Thank you for allowing me to participate in the care of your patient.  Please do not hesitate to call with any questions or concerns.  We will keep you aware of any further encounters with Teo Jones.        Sincerely  yours,         ALYSSA Blackwell Jeremy, MD

## 2025-01-23 NOTE — PROGRESS NOTES
"01/23/2025       Maurice Caraballo MD  [address]  [facility] [address]    Teo Jonse  1955    Chief Complaint   Patient presents with    Follow-up     1 year follow up w/ testing. Last seen 1/11/24. Patient denies any stroke type symptoms or new/worsening back/abdominal pains. Meds were reviewed from physical list given by patient.        Dear Maurice Caraballo MD   I had the pleasure of seeing your patient Teo Jones in the office today.  As you recall, Toe Jones is a 69 y.o.  male who we are following for an abdominal aortic aneurysm.  He was initially being evaluated for back pain and found to have a very large infrarenal abdominal aortic aneurysm.  He did undergo endovascular abdominal aortic aneurysm repair on 11/10/2021.  Postoperatively it appears one of his limbs was kinked at the flow divider.  He underwent an angiogram with kissing balloon angioplasty on 12/15/2021.  Currently he is doing well and denies any significant abdominal or back pain.  Unfortunately he is a current smoker.  He did have noninvasive testing performed which I did review in office.        Review of Systems   Constitutional: Negative.    HENT: Negative.     Eyes: Negative.    Respiratory: Negative.     Cardiovascular: Negative.    Gastrointestinal: Negative.    Endocrine: Negative.    Genitourinary: Negative.    Musculoskeletal: Negative.  Positive for back pain (chronic).   Skin: Negative.    Allergic/Immunologic: Negative.    Neurological: Negative.    Hematological: Negative.    Psychiatric/Behavioral: Negative.     All other systems reviewed and are negative.       /80   Pulse 55   Ht 203.2 cm (80\")   Wt 108 kg (237 lb)   SpO2 97%   BMI 26.04 kg/m²   Physical Exam  Vitals and nursing note reviewed.   Constitutional:       Appearance: Normal appearance. He is well-developed, well-groomed and overweight.   HENT:      Head: Normocephalic and atraumatic.   Eyes:      General: No scleral icterus.     " Pupils: Pupils are equal, round, and reactive to light.   Neck:      Thyroid: No thyromegaly.   Cardiovascular:      Rate and Rhythm: Normal rate and regular rhythm.      Heart sounds: Normal heart sounds.   Pulmonary:      Effort: Pulmonary effort is normal.      Breath sounds: Normal breath sounds.   Abdominal:      General: Bowel sounds are normal.      Palpations: Abdomen is soft.   Musculoskeletal:      Cervical back: Normal range of motion and neck supple.      Comments: Chronic back pain   Skin:     General: Skin is warm and dry.   Neurological:      Mental Status: He is alert and oriented to person, place, and time.   Psychiatric:         Mood and Affect: Mood normal.         Behavior: Behavior normal. Behavior is cooperative.         Thought Content: Thought content normal.         Judgment: Judgment normal.             Diagnostic data:    CT Angiogram Abdomen Pelvis    Result Date: 1/23/2025  Narrative: CT ANGIOGRAM ABDOMEN PELVIS- 1/23/2025 7:20 AM  HISTORY: Aortic aneurysm (AAA), post-op; I71.40-Abdominal aortic aneurysm, without rupture, unspecified  COMPARISON: 1/11/2024  TOTAL DOSE LENGTH PRODUCT: 1706.63 mGy.cm. Automated exposure control was also utilized to decrease patient radiation dose.  TECHNIQUE: Axial images of the abdomen pelvis are obtained pre and post IV contrast. 2D, 3D, MIPS reconstructed images are reviewed.  FINDINGS: Endovascular repair of the infrarenal abdominal aortic aneurysm. No visible endoleak. Aneurysm sac is stable in size 6 cm in greatest diameter. Iliac limbs are patent. The celiac trunk, superior mesenteric, bilateral renal arteries remain patent.  Similar moderate size hiatal hernia. Visible lung bases are unremarkable. Mild cardiomegaly.  Mild hepatic steatosis. No suspicious focal liver or splenic mass. No pancreatic cyst or mass. Gallbladder contracted. No suspicious adrenal nodule. Right renal cyst. No enhancing renal mass. No hydronephrosis. Bladder wall thickening  may relate to under distention. No prostate enlargement.  No free intraperitoneal air or loculated abscess collection. No bowel obstruction. Incidental small fatty containing umbilical hernia. No pathologic lymphadenopathy.  Old healed inferior right pelvic fractures. Degenerative changes thoracolumbar spine. Grade 1 spondylolisthesis of L3 over L4. No focal aggressive regional bony lesions identified.      Impression:  1. Stable endovascular repair of the infrarenal abdominal aortic aneurysm with no evidence of endoleak. Aneurysm sac similar in size measuring 6 cm. 2. Similar moderate size hiatal hernia. 3. Mild hepatic steatosis. 4. Diffuse bladder wall thickening likely due to under distention. Correlation for signs of cystitis recommended.  This report was signed and finalized on 1/23/2025 3:23 PM by Dr. Rosangela Katz MD.          Patient Active Problem List   Diagnosis    Hypertension    Hyperlipidemia    Tobacco use    Abdominal aortic aneurysm (AAA) greater than 5.5 cm in diameter in male    Acute bronchitis    Arthritis    Amaro's esophagus    Chest pain    COPD (chronic obstructive pulmonary disease)    Dizziness    Foreign body    Prediabetes    Gout    Abdominal aortic aneurysm (AAA)    Stenosis of other vascular prosthetic devices, implants and grafts, initial encounter    S/P AAA (abdominal aortic aneurysm) repair    PAF (paroxysmal atrial fibrillation)    Epistaxis    Presence of Watchman left atrial appendage closure device    Gastroesophageal reflux disease    Amaro's esophagus determined by biopsy        Diagnosis Plan   1. Abdominal aortic aneurysm (AAA) greater than 5.5 cm in diameter in male  CT Angiogram Abdomen Pelvis      2. Bilateral carotid artery stenosis  US Carotid Bilateral      3. Hyperlipidemia, unspecified hyperlipidemia type        4. Primary hypertension        5. Tobacco use        6. PAD (peripheral artery disease)  US Ankle / Brachial Indices Extremity Complete               Plan: After thoroughly evaluating Teo Jones, I believe the best course of action is to remain conservative from vascular surgery standpoint.  I did review his testing and aneurysm is stable measuring about 6 cm which is unchanged.  I do not see any evidence of endoleak.  We will see him back in 1 year with repeat noninvasive testing including a CTA of the abdomen pelvis, carotid duplex, and screening ABIs.  He does have some leg weakness believe this is more in relation to his lumbar spine.  I did discuss vascular risk factors as they pertain to the progression of vascular disease including controlling his hypertension, hyperlipidemia, and smoking cessation.  His blood pressure is slightly elevated at 142/80.  He should continue to monitor and discuss further with his primary care provider.  He should continue his Eliquis 5 mg twice daily and Lipitor 20 mg daily in addition to his other medications.  Unfortunately, he is a current daily smoker and does not wish to quit smoking at this time. Body mass index is 26.04 kg/m².    This was all discussed in full with complete understanding.  Thank you for allowing me to participate in the care of your patient.  Please do not hesitate to call with any questions or concerns.  We will keep you aware of any further encounters with Teo Jones.        Sincerely yours,         ALYSSA Blackwell Jeremy, MD

## 2025-01-23 NOTE — TELEPHONE ENCOUNTER
----- Message from Samantha Luque sent at 1/22/2025  1:31 PM CST -----  From an EP standpoint, the patient has had a recent ablation December 4th that would put them at higher risk that typical for discontinuation of oral anticoagulation. After March 4th, he will be acceptable to hold his anticoagulation. However, he has a Watchman and will need to be on aspirin 81mg thereafter without interruption.     For patients on NOAC, bridging anticoagulation is not recommended.  ----- Message -----  From: Donnell Pendleton MA  Sent: 1/17/2025   2:57 PM CST  To: AGUSTO Parks

## 2025-01-28 ENCOUNTER — OFFICE VISIT (OUTPATIENT)
Dept: FAMILY MEDICINE CLINIC | Facility: CLINIC | Age: 70
End: 2025-01-28
Payer: MEDICARE

## 2025-01-28 VITALS
RESPIRATION RATE: 12 BRPM | DIASTOLIC BLOOD PRESSURE: 86 MMHG | BODY MASS INDEX: 27.31 KG/M2 | OXYGEN SATURATION: 95 % | SYSTOLIC BLOOD PRESSURE: 150 MMHG | WEIGHT: 236 LBS | HEART RATE: 58 BPM | TEMPERATURE: 97.8 F | HEIGHT: 78 IN

## 2025-01-28 DIAGNOSIS — L92.3 TATTOO REACTION: ICD-10-CM

## 2025-01-28 DIAGNOSIS — F51.01 PRIMARY INSOMNIA: ICD-10-CM

## 2025-01-28 DIAGNOSIS — I48.0 PAF (PAROXYSMAL ATRIAL FIBRILLATION): ICD-10-CM

## 2025-01-28 DIAGNOSIS — I10 PRIMARY HYPERTENSION: Primary | ICD-10-CM

## 2025-01-28 PROCEDURE — 3079F DIAST BP 80-89 MM HG: CPT | Performed by: STUDENT IN AN ORGANIZED HEALTH CARE EDUCATION/TRAINING PROGRAM

## 2025-01-28 PROCEDURE — 3077F SYST BP >= 140 MM HG: CPT | Performed by: STUDENT IN AN ORGANIZED HEALTH CARE EDUCATION/TRAINING PROGRAM

## 2025-01-28 PROCEDURE — 1125F AMNT PAIN NOTED PAIN PRSNT: CPT | Performed by: STUDENT IN AN ORGANIZED HEALTH CARE EDUCATION/TRAINING PROGRAM

## 2025-01-28 PROCEDURE — G2211 COMPLEX E/M VISIT ADD ON: HCPCS | Performed by: STUDENT IN AN ORGANIZED HEALTH CARE EDUCATION/TRAINING PROGRAM

## 2025-01-28 PROCEDURE — 99214 OFFICE O/P EST MOD 30 MIN: CPT | Performed by: STUDENT IN AN ORGANIZED HEALTH CARE EDUCATION/TRAINING PROGRAM

## 2025-01-28 RX ORDER — ESZOPICLONE 1 MG/1
1 TABLET, FILM COATED ORAL NIGHTLY
Qty: 30 TABLET | Refills: 2 | Status: SHIPPED | OUTPATIENT
Start: 2025-01-28

## 2025-01-28 RX ORDER — AMLODIPINE BESYLATE 5 MG/1
5 TABLET ORAL DAILY
Qty: 30 TABLET | Refills: 3 | Status: SHIPPED | OUTPATIENT
Start: 2025-01-28

## 2025-01-28 NOTE — PROGRESS NOTES
Chief Complaint  Insomnia (Same /)    Subjective        Teo Jones presents to [facility]    HPI    History of Present Illness  The patient presents for evaluation of tattoo allergy, blood pressure management, sleep disturbance, and medication management.    He reports an allergic reaction to a recent tattoo that was done by a friend at home, which has resulted in the formation of a cyst. The cyst was previously hard and painful to touch but has since softened and become less painful. He attempted to drain the cyst using needles but was unsuccessful as no blood or fluid was released. He is not experiencing any fevers or other symptoms.    He underwent a heart ablation procedure in December 2024 and has been faring well post-procedure. He also had a Watchman procedure performed prior to the ablation. His cardiologist has advised him to continue Eliquis until March 2025, three months post-ablation. However, he prefers to switch to baby aspirin as he believes it is more effective. He has been provided with an 11-month supply of Eliquis. His gastroenterologist has recommended discontinuing the blood thinner five days prior to an upcoming EGD scheduled for 02/12/2025. He reports feeling well overall. His cardiologist has informed him that his heart rate is slow and irregular, and he is scheduled for a follow-up visit next month. If there is no improvement, further intervention may be considered in March 2025. He has noticed an improvement in his breathing since the ablation procedure.    He has been prescribed trazodone 100 mg for sleep but reports that it is not effective. He typically falls asleep about 45 minutes after taking the medication but wakes up around 1:00 AM. He has not tried Lunesta before. He often eats cereal when he wakes up during the night and then watches TV until morning. He finds that he sleeps best in the mornings.    He monitors his blood pressure at home  in the mornings and evenings. He was previously on atenolol, which was effective, but it was discontinued for an unknown reason.    He is requesting refills for his medications.    ALLERGIES  The patient is allergic to TATTOOING.    MEDICATIONS  - Eliquis  - Metoprolol  - Lisinopril  - Atorvastatin  - Trazodone    Past Medical History:   Diagnosis Date    Abnormal ECG     Aneurysm     Arrhythmia     Asthma     Atrial fibrillation     COPD (chronic obstructive pulmonary disease)     Elevated cholesterol     GERD (gastroesophageal reflux disease)     Hyperlipidemia     Hypertension     Type 2 diabetes mellitus 12/09/2021     Past Surgical History:   Procedure Laterality Date    ABDOMINAL AORTIC ANEURYSM REPAIR WITH ENDOGRAFT N/A 11/10/2021    Procedure: ABDOMINAL AORTIC ANEURYSM REPAIR WITH ENDOGRAFT;  Surgeon: Khai Reynolds DO;  Location:  [facility];  Service: Vascular;  Laterality: N/A;    AORTAGRAM N/A 12/15/2021    Procedure: AORTOILIAC ANGIOGRAM, BILATERAL ILIAC BALLOON ANGIOPLASTY, MYNX CLOSURE;  Surgeon: Khai Reynolds DO;  Location:  [facility];  Service: Vascular;  Laterality: N/A;    APPENDECTOMY      ATRIAL APPENDAGE EXCLUSION LEFT WITH TRANSESOPHAGEAL ECHOCARDIOGRAM Right 5/9/2023    Procedure: Atrial Appendage Occlusion;  Surgeon: Ihsan Zuniga MD;  Location:  [facility];  Service: Cardiology;  Laterality: Right;    CARDIAC ELECTROPHYSIOLOGY PROCEDURE N/A 12/4/2024    Procedure: Ablation atrial fibrillation - PFA;  Surgeon: Sea Howe MD;  Location:  [facility];  Service: Cardiovascular;  Laterality: N/A;     Social History     Socioeconomic History    Marital status:    Tobacco Use    Smoking status: Every Day     Current packs/day: 0.50     Types: Cigarettes    Smokeless tobacco: Never    Tobacco comments:     SMOKES 6-8 CIG/DAY,  ROLLS HIS OWN   Vaping Use    Vaping status: Never Used   Substance and Sexual Activity     "Alcohol use: Yes     Comment: rarely    Drug use: Yes     Types: Marijuana    Sexual activity: Defer       Objective   Vital Signs:  /86   Pulse 58   Temp 97.8 °F (36.6 °C) (Temporal)   Resp 12   Ht 203.2 cm (80\")   Wt 107 kg (236 lb)   SpO2 95%   BMI 25.93 kg/m²   Estimated body mass index is 25.93 kg/m² as calculated from the following:    Height as of this encounter: 203.2 cm (80\").    Weight as of this encounter: 107 kg (236 lb).              Physical Exam  Vitals reviewed.   Constitutional:       Appearance: Normal appearance.   HENT:      Head: Normocephalic.      Nose: Nose normal.      Mouth/Throat:      Mouth: Mucous membranes are moist.   Eyes:      Extraocular Movements: Extraocular movements intact.   Cardiovascular:      Rate and Rhythm: Normal rate and regular rhythm.      Heart sounds: Normal heart sounds.   Pulmonary:      Effort: Pulmonary effort is normal.      Breath sounds: Normal breath sounds.   Musculoskeletal:         General: Normal range of motion.      Cervical back: Normal range of motion.      Comments: Tattoo on L hand base of thumb w/ adjacent scabbed raised lesion. No surrounding erythema, induration, pain, warmth or discharge.   Skin:     General: Skin is warm and dry.   Neurological:      General: No focal deficit present.      Mental Status: He is alert and oriented to person, place, and time.   Psychiatric:         Mood and Affect: Mood normal.         Behavior: Behavior normal.        Physical Exam  Vital Signs  The patient's heart rate is 58. Blood pressure readings are 156/86, 142/80, and 150.    Result Review :        Results               Assessment and Plan   Diagnoses and all orders for this visit:    1. Primary hypertension (Primary)  -His blood pressure has been consistently elevated, with readings in the low 150s and 140s. He will continue his current regimen of lisinopril 40 mg. Additionally, amlodipine 5 mg will be introduced, to be taken once daily. He is " advised to monitor his blood pressure daily or every other day and maintain a log for future reference.  -     amLODIPine (NORVASC) 5 MG tablet; Take 1 tablet by mouth Daily.  Dispense: 30 tablet; Refill: 3    2. Primary insomnia  -He will discontinue the use of trazodone. A prescription for Lunesta has been provided, with instructions to start at 1 mg per night for the first week. If well-tolerated, the dosage can be increased to 2 mg per night in the second week. The prescription will be sent to [facility].  -     eszopiclone (Lunesta) 1 MG tablet; Take 1 tablet by mouth Every Night. Take immediately before bedtime  Dispense: 30 tablet; Refill: 2    3. Tattoo reaction  -The clinical presentation suggests a resolving abscess, likely secondary to an infection caused by the tattoo. He is advised to monitor the condition closely. The application of triple antibiotic ointment, followed by coverage with a Band-Aid, is recommended. If there is no improvement or if the condition worsens over the next 3 to 4 weeks, he should inform the clinic.    4. PAF  -He underwent a heart ablation in December 2024 and has been advised to continue Eliquis until March 2025, which is 90 days post-ablation. He is instructed to follow up with his cardiologist in March 2025. The scheduled EGD procedure will be postponed until after March 2025 to avoid discontinuation of the blood thinner.         EMR Dragon/Transcription disclaimer:   Much of this encounter note is an electronic transcription/translation of spoken language to printed text. The electronic translation of spoken language may permit erroneous, or at times, nonsensical words or phrases to be inadvertently transcribed; although attempts have made to review the note for such errors, some may still exist. Please excuse any unrecognized transcription errors and contact us if the air is unintelligible or needs documented correction. Also, portions of this note have been copied  forward, however, changed to reflect the most current clinical status of this patient.  Follow Up   Return in about 4 weeks (around 2/25/2025).    Patient or patient representative verbalized consent for the use of Ambient Listening during the visit with  Maurice Caraballo MD for chart documentation. 2/3/2025  11:18 CST    Patient was given instructions and counseling regarding his condition or for health maintenance advice. Please see specific information pulled into the AVS if appropriate.

## 2025-02-11 ENCOUNTER — TELEPHONE (OUTPATIENT)
Dept: GASTROENTEROLOGY | Facility: CLINIC | Age: 70
End: 2025-02-11
Payer: MEDICARE

## 2025-02-11 NOTE — TELEPHONE ENCOUNTER
We need to r/s procedure for tomorrow, patient needs to wait till MARCH 2025.     Called patient 2x - no answer, can not leave message - mailbox is full.

## 2025-02-11 NOTE — TELEPHONE ENCOUNTER
Patient called back, he will call the office back in March to let us know if / when he can hold eliquis. Patient Cancel Colonoscopy and did not want to r/s right now.     Sent letter to Maurice Caraballo MD

## 2025-02-25 ENCOUNTER — OFFICE VISIT (OUTPATIENT)
Dept: FAMILY MEDICINE CLINIC | Facility: CLINIC | Age: 70
End: 2025-02-25
Payer: MEDICARE

## 2025-02-25 VITALS
OXYGEN SATURATION: 96 % | BODY MASS INDEX: 26.38 KG/M2 | HEIGHT: 78 IN | DIASTOLIC BLOOD PRESSURE: 68 MMHG | SYSTOLIC BLOOD PRESSURE: 118 MMHG | HEART RATE: 72 BPM | RESPIRATION RATE: 14 BRPM | TEMPERATURE: 98 F | WEIGHT: 228 LBS

## 2025-02-25 DIAGNOSIS — Z76.89 ENCOUNTER FOR WEIGHT MANAGEMENT: ICD-10-CM

## 2025-02-25 DIAGNOSIS — F51.01 PRIMARY INSOMNIA: ICD-10-CM

## 2025-02-25 DIAGNOSIS — L92.3 TATTOO REACTION: ICD-10-CM

## 2025-02-25 DIAGNOSIS — I10 PRIMARY HYPERTENSION: Primary | ICD-10-CM

## 2025-02-25 PROCEDURE — 3078F DIAST BP <80 MM HG: CPT | Performed by: STUDENT IN AN ORGANIZED HEALTH CARE EDUCATION/TRAINING PROGRAM

## 2025-02-25 PROCEDURE — 1125F AMNT PAIN NOTED PAIN PRSNT: CPT | Performed by: STUDENT IN AN ORGANIZED HEALTH CARE EDUCATION/TRAINING PROGRAM

## 2025-02-25 PROCEDURE — G2211 COMPLEX E/M VISIT ADD ON: HCPCS | Performed by: STUDENT IN AN ORGANIZED HEALTH CARE EDUCATION/TRAINING PROGRAM

## 2025-02-25 PROCEDURE — 3074F SYST BP LT 130 MM HG: CPT | Performed by: STUDENT IN AN ORGANIZED HEALTH CARE EDUCATION/TRAINING PROGRAM

## 2025-02-25 PROCEDURE — 99214 OFFICE O/P EST MOD 30 MIN: CPT | Performed by: STUDENT IN AN ORGANIZED HEALTH CARE EDUCATION/TRAINING PROGRAM

## 2025-02-25 RX ORDER — ESZOPICLONE 3 MG/1
3 TABLET, FILM COATED ORAL NIGHTLY
Qty: 30 TABLET | Refills: 2 | Status: SHIPPED | OUTPATIENT
Start: 2025-02-25

## 2025-02-25 NOTE — PROGRESS NOTES
Chief Complaint  spot on hand    Subjective        Teo Jones presents to [facility]    HPI    History of Present Illness  The patient presents for evaluation of a skin lesion, hypertension, insomnia, and weight management.    Pt states skin lesion resulting from skin infection after home tattoo on hand has improved/gotten smaller. The lesion exhibits dryness upon removal of the bandage but is less painful than before. There is no associated bleeding. He reports a reduction in the size of the lesion over the past month. He applies a triple antibiotic ointment at night and removes the bandage during the day for hygiene purposes. The lesion appears healthy when the bandage is removed, but it dries out subsequently. He expresses a preference for monitoring the lesion rather than intervening.    He has been prescribed Lunesta for sleep disturbances but reports that it is ineffective. He experiences drowsiness approximately 30 to 40 minutes after taking the medication, leading to sleep initiation. However, he wakes up after 1.5 to 2 hours and remains awake until 7 or 8 in the morning. He reports no side effects from the medication. He finds his morning sleep to be the most restful.    He has been making efforts to lose weight and has achieved some success. His goal is to reduce his weight to 200 pounds. He experiences difficulty in breathing when bending over due to his current weight.    His blood pressure readings have improved since the addition of amlodipine 5 mg to lisinopril 40 mg. He has not experienced any issues at home and reports that his home readings have been consistently good, although with minor fluctuations. He inquires if his blood pressure is considered high for his age.    MEDICATIONS  - Lunesta  - Eliquis    Past Medical History:   Diagnosis Date    Abnormal ECG     Aneurysm     Arrhythmia     Asthma     Atrial fibrillation     COPD (chronic obstructive  "pulmonary disease)     Elevated cholesterol     GERD (gastroesophageal reflux disease)     Hyperlipidemia     Hypertension     Type 2 diabetes mellitus 12/09/2021     Past Surgical History:   Procedure Laterality Date    ABDOMINAL AORTIC ANEURYSM REPAIR WITH ENDOGRAFT N/A 11/10/2021    Procedure: ABDOMINAL AORTIC ANEURYSM REPAIR WITH ENDOGRAFT;  Surgeon: Khai Reynolds DO;  Location:  [facility];  Service: Vascular;  Laterality: N/A;    AORTAGRAM N/A 12/15/2021    Procedure: AORTOILIAC ANGIOGRAM, BILATERAL ILIAC BALLOON ANGIOPLASTY, MYNX CLOSURE;  Surgeon: Khai Reynolds DO;  Location:  [facility];  Service: Vascular;  Laterality: N/A;    APPENDECTOMY      ATRIAL APPENDAGE EXCLUSION LEFT WITH TRANSESOPHAGEAL ECHOCARDIOGRAM Right 5/9/2023    Procedure: Atrial Appendage Occlusion;  Surgeon: Ihsan Zuniga MD;  Location:  [facility];  Service: Cardiology;  Laterality: Right;    CARDIAC ELECTROPHYSIOLOGY PROCEDURE N/A 12/4/2024    Procedure: Ablation atrial fibrillation - PFA;  Surgeon: Sea Howe MD;  Location:  [facility];  Service: Cardiovascular;  Laterality: N/A;     Social History     Socioeconomic History    Marital status:    Tobacco Use    Smoking status: Every Day     Current packs/day: 0.50     Types: Cigarettes    Smokeless tobacco: Never    Tobacco comments:     SMOKES 6-8 CIG/DAY,  ROLLS HIS OWN   Vaping Use    Vaping status: Never Used   Substance and Sexual Activity    Alcohol use: Yes     Comment: rarely    Drug use: Yes     Types: Marijuana    Sexual activity: Defer       Objective   Vital Signs:  /68   Pulse 72   Temp 98 °F (36.7 °C) (Temporal)   Resp 14   Ht 203 cm (79.92\")   Wt 103 kg (228 lb)   SpO2 96%   BMI 25.10 kg/m²   Estimated body mass index is 25.1 kg/m² as calculated from the following:    Height as of this encounter: 203 cm (79.92\").    Weight as of this encounter: 103 kg (228 lb).        "       Physical Exam  Vitals reviewed.   Constitutional:       Appearance: Normal appearance.   HENT:      Head: Normocephalic.      Nose: Nose normal.      Mouth/Throat:      Mouth: Mucous membranes are moist.   Eyes:      Extraocular Movements: Extraocular movements intact.   Cardiovascular:      Rate and Rhythm: Normal rate and regular rhythm.      Heart sounds: Normal heart sounds.   Pulmonary:      Effort: Pulmonary effort is normal.      Breath sounds: Normal breath sounds.   Musculoskeletal:         General: Normal range of motion.      Cervical back: Normal range of motion.   Skin:     General: Skin is warm and dry.   Neurological:      General: No focal deficit present.      Mental Status: He is alert and oriented to person, place, and time.   Psychiatric:         Mood and Affect: Mood normal.         Behavior: Behavior normal.        Physical Exam  Vital Signs  Blood pressure is 118/60. BMI is 25.1.    Result Review :        Results               Assessment and Plan   Diagnoses and all orders for this visit:    1. Primary hypertension (Primary)  -Improved, at goal.  Continue lisinopril 40 mg, amlodipine 5 mg  2. Primary insomnia  -Will try increased lunesta dose  -     eszopiclone (Lunesta) 3 MG tablet; Take 1 tablet by mouth Every Night. Take immediately before bedtime  Dispense: 30 tablet; Refill: 2    3. Tattoo reaction  -The lesion has shown a reduction in size and is less sore than before. He is advised to continue monitoring the lesion without any intervention unless it fails to resolve.    4. Encounter for weight management.  -His BMI is currently 25.1, which is within the normal range. He has successfully lost 8 pounds since 01/28/2024. He is encouraged to continue his weight loss efforts with a goal to reach 200 pounds.         EMR Dragon/Transcription disclaimer:   Part of this note may be an electronic transcription/translation of spoken language to printed text using the Dragon Dictation System      Follow Up   Return in about 6 weeks (around 4/8/2025).    Patient or patient representative verbalized consent for the use of Ambient Listening during the visit with  Maurice Caraballo MD for chart documentation. 2/25/2025  15:05 CST    Patient was given instructions and counseling regarding his condition or for health maintenance advice. Please see specific information pulled into the AVS if appropriate.

## 2025-04-01 ENCOUNTER — OFFICE VISIT (OUTPATIENT)
Dept: FAMILY MEDICINE CLINIC | Facility: CLINIC | Age: 70
End: 2025-04-01
Payer: MEDICARE

## 2025-04-01 VITALS
BODY MASS INDEX: 26.27 KG/M2 | RESPIRATION RATE: 12 BRPM | HEIGHT: 78 IN | OXYGEN SATURATION: 97 % | HEART RATE: 67 BPM | TEMPERATURE: 97.9 F | WEIGHT: 227 LBS | SYSTOLIC BLOOD PRESSURE: 126 MMHG | DIASTOLIC BLOOD PRESSURE: 74 MMHG

## 2025-04-01 DIAGNOSIS — F51.01 PRIMARY INSOMNIA: Primary | ICD-10-CM

## 2025-04-01 DIAGNOSIS — R51.9 NONINTRACTABLE HEADACHE, UNSPECIFIED CHRONICITY PATTERN, UNSPECIFIED HEADACHE TYPE: ICD-10-CM

## 2025-04-01 DIAGNOSIS — I10 PRIMARY HYPERTENSION: ICD-10-CM

## 2025-04-01 PROCEDURE — 3078F DIAST BP <80 MM HG: CPT | Performed by: STUDENT IN AN ORGANIZED HEALTH CARE EDUCATION/TRAINING PROGRAM

## 2025-04-01 PROCEDURE — G2211 COMPLEX E/M VISIT ADD ON: HCPCS | Performed by: STUDENT IN AN ORGANIZED HEALTH CARE EDUCATION/TRAINING PROGRAM

## 2025-04-01 PROCEDURE — 99214 OFFICE O/P EST MOD 30 MIN: CPT | Performed by: STUDENT IN AN ORGANIZED HEALTH CARE EDUCATION/TRAINING PROGRAM

## 2025-04-01 PROCEDURE — 3074F SYST BP LT 130 MM HG: CPT | Performed by: STUDENT IN AN ORGANIZED HEALTH CARE EDUCATION/TRAINING PROGRAM

## 2025-04-01 PROCEDURE — 1125F AMNT PAIN NOTED PAIN PRSNT: CPT | Performed by: STUDENT IN AN ORGANIZED HEALTH CARE EDUCATION/TRAINING PROGRAM

## 2025-04-01 RX ORDER — LISINOPRIL 40 MG/1
40 TABLET ORAL DAILY
Qty: 90 TABLET | Refills: 3 | Status: SHIPPED | OUTPATIENT
Start: 2025-04-01

## 2025-04-01 RX ORDER — AMLODIPINE BESYLATE 5 MG/1
5 TABLET ORAL DAILY
Qty: 90 TABLET | Refills: 3 | Status: SHIPPED | OUTPATIENT
Start: 2025-04-01

## 2025-04-01 NOTE — PROGRESS NOTES
Chief Complaint  Insomnia (Much better.. )    Subjective        Teo Jones presents to [facility]    HPI    History of Present Illness  The patient presents for evaluation of sleep issues, headaches, and blood pressure management.    He reports that his current medication regimen, including Lunesta 3 mg, is effectively managing his sleep disturbances. However, he experiences frequent awakenings at night due to noise disturbances from his neighbors. He resides in government housing on [address] [place] [address] and is actively seeking to relocate.    He also reports experiencing headaches, which he attributes to eye strain rather than hypertension, as his blood pressure readings have consistently been within normal range. His last ophthalmological examination was conducted in 2024, and he is due for a follow-up this year.    He is currently on a blood thinner, with the most recent dose administered this morning. He has a scheduled appointment with his cardiologist tomorrow and anticipates a potential discontinuation of the anticoagulant therapy. He is uncertain about the need for refills of his other medications, including amlodipine and lisinopril 40 mg, as these are typically managed by another physician.    Supplemental Information  He has gout in his hands.    MEDICATIONS  - Lunesta  - Amlodipine  - Lisinopril    Past Medical History:   Diagnosis Date    Abnormal ECG     Aneurysm     Arrhythmia     Asthma     Atrial fibrillation     COPD (chronic obstructive pulmonary disease)     Elevated cholesterol     GERD (gastroesophageal reflux disease)     Hyperlipidemia     Hypertension     Type 2 diabetes mellitus 12/09/2021     Past Surgical History:   Procedure Laterality Date    ABDOMINAL AORTIC ANEURYSM REPAIR WITH ENDOGRAFT N/A 11/10/2021    Procedure: ABDOMINAL AORTIC ANEURYSM REPAIR WITH ENDOGRAFT;  Surgeon: Khai Reynolds DO;  Location: [place] [address];  Service: Vascular;   "Laterality: N/A;    AORTAGRAM N/A 12/15/2021    Procedure: AORTOILIAC ANGIOGRAM, BILATERAL ILIAC BALLOON ANGIOPLASTY, MYNX CLOSURE;  Surgeon: Khai Reynolds DO;  Location:  [facility];  Service: Vascular;  Laterality: N/A;    APPENDECTOMY      ATRIAL APPENDAGE EXCLUSION LEFT WITH TRANSESOPHAGEAL ECHOCARDIOGRAM Right 5/9/2023    Procedure: Atrial Appendage Occlusion;  Surgeon: Ihsan Zuniga MD;  Location:  [facility];  Service: Cardiology;  Laterality: Right;    CARDIAC ELECTROPHYSIOLOGY PROCEDURE N/A 12/4/2024    Procedure: Ablation atrial fibrillation - PFA;  Surgeon: Sea Howe MD;  Location:  [facility];  Service: Cardiovascular;  Laterality: N/A;     Social History     Socioeconomic History    Marital status:    Tobacco Use    Smoking status: Every Day     Current packs/day: 1.00     Average packs/day: 1 pack/day for 54.3 years (54.3 ttl pk-yrs)     Types: Cigarettes     Start date: 1/1/1971    Smokeless tobacco: Never    Tobacco comments:     SMOKES 6-8 CIG/DAY,  ROLLS HIS OWN   Vaping Use    Vaping status: Never Used   Substance and Sexual Activity    Alcohol use: Yes     Comment: rarely    Drug use: Yes     Types: Marijuana    Sexual activity: Defer       Objective   Vital Signs:  /74   Pulse 67   Temp 97.9 °F (36.6 °C) (Temporal)   Resp 12   Ht 203 cm (79.92\")   Wt 103 kg (227 lb)   SpO2 97%   BMI 24.99 kg/m²   Estimated body mass index is 24.99 kg/m² as calculated from the following:    Height as of this encounter: 203 cm (79.92\").    Weight as of this encounter: 103 kg (227 lb).       BMI is within normal parameters. No other follow-up for BMI required.      Physical Exam  Vitals reviewed.   Constitutional:       Appearance: Normal appearance.   HENT:      Head: Normocephalic.      Nose: Nose normal.      Mouth/Throat:      Mouth: Mucous membranes are moist.   Eyes:      Extraocular Movements: Extraocular movements intact. "   Cardiovascular:      Rate and Rhythm: Normal rate and regular rhythm.      Heart sounds: Normal heart sounds.   Pulmonary:      Effort: Pulmonary effort is normal.      Breath sounds: Normal breath sounds.   Musculoskeletal:         General: Normal range of motion.      Cervical back: Normal range of motion.   Skin:     General: Skin is warm and dry.   Neurological:      General: No focal deficit present.      Mental Status: He is alert and oriented to person, place, and time.   Psychiatric:         Mood and Affect: Mood normal.         Behavior: Behavior normal.        Physical Exam      Result Review :        Results               Assessment and Plan   Diagnoses and all orders for this visit:    1. Primary insomnia (Primary)  -His sleep disturbances are being managed with Lunesta 3 mg, which he reports is working well. A prescription refill for Lunesta 3 mg has been provided.    2. Primary hypertension  -His blood pressure readings have consistently been within normal range. A prescription refill for amlodipine has been provided, with a 90-day supply and additional refills. Additionally, a prescription refill for lisinopril 40 mg has been provided.  -     amLODIPine (NORVASC) 5 MG tablet; Take 1 tablet by mouth Daily.  Dispense: 90 tablet; Refill: 3  -     lisinopril (PRINIVIL,ZESTRIL) 40 MG tablet; Take 1 tablet by mouth Daily.  Dispense: 90 tablet; Refill: 3    3. Nonintractable headache, unspecified chronicity pattern, unspecified headache type  -He experiences headaches that seem to originate from the back of his eyes. He is advised to undergo an ophthalmological examination to rule out any potential eye strain or other ocular issues contributing to his headaches. If the eye examination does not reveal any issues, further evaluation will be considered.    FU 6 mo       EMR Dragon/Transcription disclaimer:   Part of this note may be an electronic transcription/translation of spoken language to printed text  using the Dragon Dictation System     Follow Up   No follow-ups on file.    Patient or patient representative verbalized consent for the use of Ambient Listening during the visit with  Maurice Caraballo MD for chart documentation. 4/14/2025  12:34 CDT    Patient was given instructions and counseling regarding his condition or for health maintenance advice. Please see specific information pulled into the AVS if appropriate.

## 2025-04-02 ENCOUNTER — OFFICE VISIT (OUTPATIENT)
Dept: CARDIOLOGY | Facility: CLINIC | Age: 70
End: 2025-04-02
Payer: MEDICARE

## 2025-04-02 VITALS
HEART RATE: 62 BPM | OXYGEN SATURATION: 99 % | DIASTOLIC BLOOD PRESSURE: 82 MMHG | BODY MASS INDEX: 26.27 KG/M2 | HEIGHT: 78 IN | SYSTOLIC BLOOD PRESSURE: 130 MMHG | WEIGHT: 227 LBS

## 2025-04-02 DIAGNOSIS — I48.0 PAF (PAROXYSMAL ATRIAL FIBRILLATION): Primary | ICD-10-CM

## 2025-04-02 DIAGNOSIS — Z95.818 PRESENCE OF WATCHMAN LEFT ATRIAL APPENDAGE CLOSURE DEVICE: ICD-10-CM

## 2025-04-02 DIAGNOSIS — Z98.890 S/P AAA (ABDOMINAL AORTIC ANEURYSM) REPAIR: ICD-10-CM

## 2025-04-02 DIAGNOSIS — Z86.79 S/P AAA (ABDOMINAL AORTIC ANEURYSM) REPAIR: ICD-10-CM

## 2025-04-02 PROCEDURE — 93000 ELECTROCARDIOGRAM COMPLETE: CPT

## 2025-04-02 PROCEDURE — 99214 OFFICE O/P EST MOD 30 MIN: CPT

## 2025-04-02 PROCEDURE — 3075F SYST BP GE 130 - 139MM HG: CPT

## 2025-04-02 PROCEDURE — 3079F DIAST BP 80-89 MM HG: CPT

## 2025-04-02 RX ORDER — ASPIRIN 81 MG/1
81 TABLET ORAL DAILY
Start: 2025-04-02

## 2025-04-02 NOTE — PROGRESS NOTES
[facility]   Reason For Visit:  Atrial Fibrillation     Subjective        EP Problems:  1.  Paroxysmal atrial fibrillation  -12/4/24: PVI, [facility]   2.  Presence of left atrial appendage closure device     Cardiology Problems:  1.  Abdominal aortic aneurysm status postrepair  2.  Hypertension  3.  Hyperlipidemia     Medical Problems:  1.  Epistaxis  2.  Type 2 diabetes  3.  Amaro's esophagus  4.  COPD  5.  Tobacco use disorder        Teo Jones is a 69 y.o. male with above pertinent PMH who presents for follow-up of paroxysmal atrial fibrillation.    Last seen in January 2025.  Was doing well after his ablation.  No recurrence of A-fib to his knowledge after ablation.  Regimen was continued.    Since that appointment has been doing well.  He is still taking Eliquis at this time.  Denies any significant bleeding concerns.  Tolerating the rest of his regimen.  He does have some intermittent palpitations, however none are sustained or cause him significant distress.  Overall he is doing well.    ROS: Pertinent findings as noted above        Pertinent past medical, surgical, family, and social history were reviewed.      Current Outpatient Medications:     albuterol (PROVENTIL) (2.5 MG/3ML) 0.083% nebulizer solution, Take 2.5 mg by nebulization Every 4 (Four) Hours As Needed for Wheezing., Disp: 3 mL, Rfl: 2    allopurinol (ZYLOPRIM) 300 MG tablet, Take 1 tablet by mouth Every Night., Disp: , Rfl:     amLODIPine (NORVASC) 5 MG tablet, Take 1 tablet by mouth Daily., Disp: 90 tablet, Rfl: 3    atorvastatin (LIPITOR) 20 MG tablet, Take 1 tablet by mouth Every Night., Disp: , Rfl:     eszopiclone (Lunesta) 3 MG tablet, Take 1 tablet by mouth Every Night. Take immediately before bedtime, Disp: 30 tablet, Rfl: 2    famotidine (PEPCID) 40 MG tablet, Take 1 tablet by mouth Every Night., Disp: 90 tablet, Rfl: 1    lisinopril (PRINIVIL,ZESTRIL) 40 MG tablet, Take 1 tablet by mouth Daily., Disp: 90  "tablet, Rfl: 3    metoprolol succinate XL (TOPROL-XL) 100 MG 24 hr tablet, Take 1 tablet by mouth Daily., Disp: , Rfl:     omeprazole (priLOSEC) 40 MG capsule, Take 1 capsule by mouth Every Morning., Disp: , Rfl:     aspirin 81 MG EC tablet, Take 1 tablet by mouth Daily., Disp: , Rfl:      Objective   Vital Signs:  /82   Pulse 62   Ht 203 cm (79.92\")   Wt 103 kg (227 lb)   SpO2 99%   BMI 24.99 kg/m²   Estimated body mass index is 24.99 kg/m² as calculated from the following:    Height as of this encounter: 203 cm (79.92\").    Weight as of this encounter: 103 kg (227 lb).      Constitutional:       Appearance: Healthy appearance. Not in distress.   Pulmonary:      Effort: Pulmonary effort is normal.      Breath sounds: Normal breath sounds and air entry.   Cardiovascular:      PMI at left midclavicular line. Normal rate. Regular rhythm. Normal S1. Normal S2.       Murmurs: There is no murmur.      No gallop.  No click. No rub.   Pulses:     Intact distal pulses.   Edema:     Peripheral edema absent.   Neurological:      Mental Status: Alert and oriented to person, place and time.        Result Review :  The following data was reviewed by: ALYSSA Culp on 04/02/2025:  CMP   CMP          8/19/2024    09:09 9/25/2024    14:38 11/27/2024    10:21   CMP   Glucose 117  96  117    BUN 10  11  7    Creatinine 0.79  0.82  0.87    EGFR 97.4  96.3  94.6    Sodium 139  140  140    Potassium 4.6  4.3  3.9    Chloride 102  104  105    Calcium 9.3  9.5  8.9    Total Protein 7.5  7.8     Albumin 4.0  4.4     Globulin 3.5  3.4     Total Bilirubin 0.2  0.5     Alkaline Phosphatase 95  80     AST (SGOT) 24  31     ALT (SGPT) 15  26     Albumin/Globulin Ratio 1.1  1.3     BUN/Creatinine Ratio 12.7  13.4  8.0    Anion Gap 14.0  13.0  11.0      CBC   CBC          8/19/2024    09:09 9/25/2024    14:38 11/27/2024    10:21   CBC   WBC 6.76  7.10  6.32    RBC 4.32  4.79  4.50    Hemoglobin 14.5  15.8  15.0    Hematocrit " 43.1  48.3  44.3    MCV 99.8  100.8  98.4    MCH 33.6  33.0  33.3    MCHC 33.6  32.7  33.9    RDW 13.1  13.6  14.4    Platelets 332  243  201           ECG 12 Lead    Date/Time: 4/2/2025 9:05 AM  Performed by: Valdo Manrique APRN    Authorized by: Valdo Manrique APRN  Comparison: compared with previous ECG from 1/17/2025  Similar to previous ECG  Comparison to previous ECG: Sinus bradycardia  Rhythm: sinus bradycardia  Rate: bradycardic  QRS axis: normal    Clinical impression: normal ECG            Assessment and Plan   Diagnoses and all orders for this visit:    1. PAF (paroxysmal atrial fibrillation) (Primary)  2. Presence of Watchman left atrial appendage closure device  3. S/P AAA (abdominal aortic aneurysm) repair  -In sinus rhythm on EKG today  - At this point patient may stop Eliquis and resume aspirin 81 mg daily  - Continue Toprol- mg daily  - Follow-up in the electrophysiology clinic in 6 months                   I spent 2 minutes on the separately reported service of EKG interpretation. This time is not included in the time used to support the E/M service also reported today.      Follow Up   Return in about 6 months (around 10/2/2025).  Patient was given instructions and counseling regarding his condition or for health maintenance advice. Please see specific information pulled into the AVS if appropriate.       Part of this note may be an electronic transcription/translation of spoken language to printed text using the Dragon Dictation System.

## 2025-05-05 DIAGNOSIS — I10 PRIMARY HYPERTENSION: ICD-10-CM

## 2025-05-05 RX ORDER — AMLODIPINE BESYLATE 5 MG/1
5 TABLET ORAL DAILY
Qty: 30 TABLET | Refills: 3 | Status: SHIPPED | OUTPATIENT
Start: 2025-05-05

## 2025-05-09 DIAGNOSIS — I10 PRIMARY HYPERTENSION: ICD-10-CM

## 2025-05-09 RX ORDER — AMLODIPINE BESYLATE 5 MG/1
5 TABLET ORAL DAILY
Qty: 30 TABLET | Refills: 2 | OUTPATIENT
Start: 2025-05-09

## 2025-05-26 ENCOUNTER — ANESTHESIA (OUTPATIENT)
Dept: PERIOP | Facility: HOSPITAL | Age: 70
End: 2025-05-26
Payer: MEDICARE

## 2025-05-26 ENCOUNTER — PREP FOR SURGERY (OUTPATIENT)
Dept: OTHER | Facility: HOSPITAL | Age: 70
End: 2025-05-26
Payer: MEDICARE

## 2025-05-26 ENCOUNTER — APPOINTMENT (OUTPATIENT)
Dept: GENERAL RADIOLOGY | Facility: HOSPITAL | Age: 70
End: 2025-05-26
Payer: MEDICARE

## 2025-05-26 ENCOUNTER — ANESTHESIA EVENT (OUTPATIENT)
Dept: PERIOP | Facility: HOSPITAL | Age: 70
End: 2025-05-26
Payer: MEDICARE

## 2025-05-26 ENCOUNTER — APPOINTMENT (OUTPATIENT)
Dept: CT IMAGING | Facility: HOSPITAL | Age: 70
End: 2025-05-26
Payer: MEDICARE

## 2025-05-26 ENCOUNTER — HOSPITAL ENCOUNTER (INPATIENT)
Facility: HOSPITAL | Age: 70
LOS: 2 days | Discharge: HOME | End: 2025-05-28
Attending: EMERGENCY MEDICINE | Admitting: NEUROLOGICAL SURGERY
Payer: MEDICARE

## 2025-05-26 DIAGNOSIS — S12.100A CLOSED ODONTOID FRACTURE, INITIAL ENCOUNTER: Primary | ICD-10-CM

## 2025-05-26 DIAGNOSIS — Z74.09 IMPAIRED MOBILITY: ICD-10-CM

## 2025-05-26 DIAGNOSIS — S12.100D CLOSED ODONTOID FRACTURE WITH ROUTINE HEALING, SUBSEQUENT ENCOUNTER: ICD-10-CM

## 2025-05-26 PROBLEM — S12.110A ODONTOID FRACTURE: Status: ACTIVE | Noted: 2025-05-26

## 2025-05-26 LAB
ALBUMIN SERPL-MCNC: 3.8 G/DL (ref 3.5–5.2)
ALBUMIN/GLOB SERPL: 1.3 G/DL
ALP SERPL-CCNC: 82 U/L (ref 39–117)
ALT SERPL W P-5'-P-CCNC: 16 U/L (ref 1–41)
ANION GAP SERPL CALCULATED.3IONS-SCNC: 11 MMOL/L (ref 5–15)
AST SERPL-CCNC: 19 U/L (ref 1–40)
BASOPHILS # BLD AUTO: 0.04 10*3/MM3 (ref 0–0.2)
BASOPHILS NFR BLD AUTO: 0.4 % (ref 0–1.5)
BILIRUB SERPL-MCNC: 0.8 MG/DL (ref 0–1.2)
BUN SERPL-MCNC: 8 MG/DL (ref 8–23)
BUN/CREAT SERPL: 13.1 (ref 7–25)
CALCIUM SPEC-SCNC: 9.1 MG/DL (ref 8.6–10.5)
CHLORIDE SERPL-SCNC: 104 MMOL/L (ref 98–107)
CO2 SERPL-SCNC: 26 MMOL/L (ref 22–29)
CREAT SERPL-MCNC: 0.61 MG/DL (ref 0.76–1.27)
DEPRECATED RDW RBC AUTO: 48.5 FL (ref 37–54)
EGFRCR SERPLBLD CKD-EPI 2021: 105.3 ML/MIN/1.73
EOSINOPHIL # BLD AUTO: 0.08 10*3/MM3 (ref 0–0.4)
EOSINOPHIL NFR BLD AUTO: 0.8 % (ref 0.3–6.2)
ERYTHROCYTE [DISTWIDTH] IN BLOOD BY AUTOMATED COUNT: 13.2 % (ref 12.3–15.4)
GLOBULIN UR ELPH-MCNC: 3 GM/DL
GLUCOSE BLDC GLUCOMTR-MCNC: 170 MG/DL (ref 70–130)
GLUCOSE SERPL-MCNC: 108 MG/DL (ref 65–99)
HCT VFR BLD AUTO: 45.6 % (ref 37.5–51)
HGB BLD-MCNC: 15.5 G/DL (ref 13–17.7)
IMM GRANULOCYTES # BLD AUTO: 0.03 10*3/MM3 (ref 0–0.05)
IMM GRANULOCYTES NFR BLD AUTO: 0.3 % (ref 0–0.5)
LYMPHOCYTES # BLD AUTO: 2.76 10*3/MM3 (ref 0.7–3.1)
LYMPHOCYTES NFR BLD AUTO: 26.5 % (ref 19.6–45.3)
MCH RBC QN AUTO: 33.6 PG (ref 26.6–33)
MCHC RBC AUTO-ENTMCNC: 34 G/DL (ref 31.5–35.7)
MCV RBC AUTO: 98.9 FL (ref 79–97)
MONOCYTES # BLD AUTO: 1.21 10*3/MM3 (ref 0.1–0.9)
MONOCYTES NFR BLD AUTO: 11.6 % (ref 5–12)
NEUTROPHILS NFR BLD AUTO: 6.28 10*3/MM3 (ref 1.7–7)
NEUTROPHILS NFR BLD AUTO: 60.4 % (ref 42.7–76)
NRBC BLD AUTO-RTO: 0 /100 WBC (ref 0–0.2)
PLATELET # BLD AUTO: 216 10*3/MM3 (ref 140–450)
PMV BLD AUTO: 9.8 FL (ref 6–12)
POTASSIUM SERPL-SCNC: 3.6 MMOL/L (ref 3.5–5.2)
PROT SERPL-MCNC: 6.8 G/DL (ref 6–8.5)
QT INTERVAL: 420 MS
QTC INTERVAL: 456 MS
RBC # BLD AUTO: 4.61 10*6/MM3 (ref 4.14–5.8)
SODIUM SERPL-SCNC: 141 MMOL/L (ref 136–145)
WBC NRBC COR # BLD AUTO: 10.4 10*3/MM3 (ref 3.4–10.8)

## 2025-05-26 PROCEDURE — L0174 CERV SR 2PC THOR EXT PRE OTS: HCPCS | Performed by: NEUROLOGICAL SURGERY

## 2025-05-26 PROCEDURE — 99285 EMERGENCY DEPT VISIT HI MDM: CPT | Performed by: EMERGENCY MEDICINE

## 2025-05-26 PROCEDURE — C1713 ANCHOR/SCREW BN/BN,TIS/BN: HCPCS | Performed by: NEUROLOGICAL SURGERY

## 2025-05-26 PROCEDURE — 82948 REAGENT STRIP/BLOOD GLUCOSE: CPT

## 2025-05-26 PROCEDURE — 25810000003 LACTATED RINGERS PER 1000 ML: Performed by: NURSE ANESTHETIST, CERTIFIED REGISTERED

## 2025-05-26 PROCEDURE — 61783 SCAN PROC SPINAL: CPT | Performed by: NEUROLOGICAL SURGERY

## 2025-05-26 PROCEDURE — 25010000002 PROPOFOL 10 MG/ML EMULSION: Performed by: NURSE ANESTHETIST, CERTIFIED REGISTERED

## 2025-05-26 PROCEDURE — 22842 INSERT SPINE FIXATION DEVICE: CPT | Performed by: NEUROLOGICAL SURGERY

## 2025-05-26 PROCEDURE — 25010000002 ONDANSETRON PER 1 MG: Performed by: NURSE ANESTHETIST, CERTIFIED REGISTERED

## 2025-05-26 PROCEDURE — 0RB30ZZ EXCISION OF CERVICAL VERTEBRAL DISC, OPEN APPROACH: ICD-10-PCS | Performed by: NEUROLOGICAL SURGERY

## 2025-05-26 PROCEDURE — 22614 ARTHRD PST TQ 1NTRSPC EA ADD: CPT | Performed by: NEUROLOGICAL SURGERY

## 2025-05-26 PROCEDURE — 76380 CAT SCAN FOLLOW-UP STUDY: CPT

## 2025-05-26 PROCEDURE — 25010000002 HYDROMORPHONE 1 MG/ML SOLUTION: Performed by: NURSE ANESTHETIST, CERTIFIED REGISTERED

## 2025-05-26 PROCEDURE — 8E0WXBZ COMPUTER ASSISTED PROCEDURE OF TRUNK REGION: ICD-10-PCS | Performed by: NEUROLOGICAL SURGERY

## 2025-05-26 PROCEDURE — 99223 1ST HOSP IP/OBS HIGH 75: CPT | Performed by: NEUROLOGICAL SURGERY

## 2025-05-26 PROCEDURE — 70486 CT MAXILLOFACIAL W/O DYE: CPT

## 2025-05-26 PROCEDURE — 22600 ARTHRD PST TQ 1NTRSPC CRV: CPT | Performed by: NEUROLOGICAL SURGERY

## 2025-05-26 PROCEDURE — 25010000002 FENTANYL CITRATE (PF) 100 MCG/2ML SOLUTION: Performed by: NURSE ANESTHETIST, CERTIFIED REGISTERED

## 2025-05-26 PROCEDURE — 25010000002 GLYCOPYRROLATE 0.4 MG/2ML SOLUTION: Performed by: NURSE ANESTHETIST, CERTIFIED REGISTERED

## 2025-05-26 PROCEDURE — 72125 CT NECK SPINE W/O DYE: CPT

## 2025-05-26 PROCEDURE — 25010000002 SUGAMMADEX 200 MG/2ML SOLUTION: Performed by: NURSE ANESTHETIST, CERTIFIED REGISTERED

## 2025-05-26 PROCEDURE — 71045 X-RAY EXAM CHEST 1 VIEW: CPT

## 2025-05-26 PROCEDURE — 76000 FLUOROSCOPY <1 HR PHYS/QHP: CPT

## 2025-05-26 PROCEDURE — 85025 COMPLETE CBC W/AUTO DIFF WBC: CPT | Performed by: EMERGENCY MEDICINE

## 2025-05-26 PROCEDURE — 25010000002 CEFAZOLIN PER 500 MG: Performed by: NEUROLOGICAL SURGERY

## 2025-05-26 PROCEDURE — 25010000002 CEFAZOLIN PER 500 MG: Performed by: NURSE ANESTHETIST, CERTIFIED REGISTERED

## 2025-05-26 PROCEDURE — 0RG2071 FUSION OF 2 OR MORE CERVICAL VERTEBRAL JOINTS WITH AUTOLOGOUS TISSUE SUBSTITUTE, POSTERIOR APPROACH, POSTERIOR COLUMN, OPEN APPROACH: ICD-10-PCS | Performed by: NEUROLOGICAL SURGERY

## 2025-05-26 PROCEDURE — 25810000003 SODIUM CHLORIDE 0.9 % SOLUTION: Performed by: NEUROLOGICAL SURGERY

## 2025-05-26 PROCEDURE — 80053 COMPREHEN METABOLIC PANEL: CPT | Performed by: EMERGENCY MEDICINE

## 2025-05-26 PROCEDURE — 70450 CT HEAD/BRAIN W/O DYE: CPT

## 2025-05-26 PROCEDURE — 72040 X-RAY EXAM NECK SPINE 2-3 VW: CPT

## 2025-05-26 PROCEDURE — 93005 ELECTROCARDIOGRAM TRACING: CPT | Performed by: EMERGENCY MEDICINE

## 2025-05-26 DEVICE — ROD 3603750 PRE-CUT 3.5MM X 50MM
Type: IMPLANTABLE DEVICE | Site: SPINE CERVICAL | Status: FUNCTIONAL
Brand: INFINITY™ OCCIPITOCERVICAL UPPER THORACIC SYSTEM

## 2025-05-26 DEVICE — DBM T43103 2.5CC GRAFTON PUTTY
Type: IMPLANTABLE DEVICE | Site: SPINE CERVICAL | Status: FUNCTIONAL
Brand: GRAFTON®AND GRAFTON PLUS®DEMINERALIZED BONE MATRIX (DBM)

## 2025-05-26 DEVICE — KT HEMOST ABS SURGIFOAM PORCN 1GRAM: Type: IMPLANTABLE DEVICE | Site: SPINE CERVICAL | Status: FUNCTIONAL

## 2025-05-26 DEVICE — ALLOGRFT DBM MAGNIFUSE 1X5CM: Type: IMPLANTABLE DEVICE | Site: SPINE CERVICAL | Status: FUNCTIONAL

## 2025-05-26 RX ORDER — SODIUM CHLORIDE 0.9 % (FLUSH) 0.9 %
3 SYRINGE (ML) INJECTION EVERY 12 HOURS SCHEDULED
Status: DISCONTINUED | OUTPATIENT
Start: 2025-05-26 | End: 2025-05-28 | Stop reason: HOSPADM

## 2025-05-26 RX ORDER — LABETALOL HYDROCHLORIDE 5 MG/ML
5 INJECTION, SOLUTION INTRAVENOUS
Status: DISCONTINUED | OUTPATIENT
Start: 2025-05-26 | End: 2025-05-26 | Stop reason: HOSPADM

## 2025-05-26 RX ORDER — CYCLOBENZAPRINE HCL 10 MG
10 TABLET ORAL 3 TIMES DAILY PRN
Status: DISCONTINUED | OUTPATIENT
Start: 2025-05-26 | End: 2025-05-28 | Stop reason: HOSPADM

## 2025-05-26 RX ORDER — ROCURONIUM BROMIDE 10 MG/ML
INJECTION, SOLUTION INTRAVENOUS AS NEEDED
Status: DISCONTINUED | OUTPATIENT
Start: 2025-05-26 | End: 2025-05-26 | Stop reason: SURG

## 2025-05-26 RX ORDER — CEFAZOLIN SODIUM 1 G/3ML
INJECTION, POWDER, FOR SOLUTION INTRAMUSCULAR; INTRAVENOUS AS NEEDED
Status: DISCONTINUED | OUTPATIENT
Start: 2025-05-26 | End: 2025-05-26 | Stop reason: SURG

## 2025-05-26 RX ORDER — ONDANSETRON 2 MG/ML
4 INJECTION INTRAMUSCULAR; INTRAVENOUS
Status: DISCONTINUED | OUTPATIENT
Start: 2025-05-26 | End: 2025-05-26 | Stop reason: HOSPADM

## 2025-05-26 RX ORDER — IBUPROFEN 600 MG/1
600 TABLET, FILM COATED ORAL EVERY 6 HOURS PRN
Status: DISCONTINUED | OUTPATIENT
Start: 2025-05-26 | End: 2025-05-26 | Stop reason: HOSPADM

## 2025-05-26 RX ORDER — GLYCOPYRROLATE 0.2 MG/ML
INJECTION INTRAMUSCULAR; INTRAVENOUS AS NEEDED
Status: DISCONTINUED | OUTPATIENT
Start: 2025-05-26 | End: 2025-05-26 | Stop reason: SURG

## 2025-05-26 RX ORDER — PANTOPRAZOLE SODIUM 40 MG/1
40 TABLET, DELAYED RELEASE ORAL
Status: DISCONTINUED | OUTPATIENT
Start: 2025-05-27 | End: 2025-05-28 | Stop reason: HOSPADM

## 2025-05-26 RX ORDER — MAGNESIUM HYDROXIDE 1200 MG/15ML
LIQUID ORAL AS NEEDED
Status: DISCONTINUED | OUTPATIENT
Start: 2025-05-26 | End: 2025-05-26 | Stop reason: HOSPADM

## 2025-05-26 RX ORDER — ACETAMINOPHEN 160 MG/5ML
650 SOLUTION ORAL EVERY 8 HOURS
Status: DISCONTINUED | OUTPATIENT
Start: 2025-05-26 | End: 2025-05-28 | Stop reason: HOSPADM

## 2025-05-26 RX ORDER — ALBUTEROL SULFATE 0.83 MG/ML
2.5 SOLUTION RESPIRATORY (INHALATION) EVERY 4 HOURS PRN
Status: DISCONTINUED | OUTPATIENT
Start: 2025-05-26 | End: 2025-05-28 | Stop reason: HOSPADM

## 2025-05-26 RX ORDER — BISACODYL 5 MG/1
5 TABLET, DELAYED RELEASE ORAL DAILY PRN
Status: DISCONTINUED | OUTPATIENT
Start: 2025-05-26 | End: 2025-05-28 | Stop reason: HOSPADM

## 2025-05-26 RX ORDER — ACETAMINOPHEN 650 MG/1
650 SUPPOSITORY RECTAL EVERY 8 HOURS
Status: DISCONTINUED | OUTPATIENT
Start: 2025-05-26 | End: 2025-05-28 | Stop reason: HOSPADM

## 2025-05-26 RX ORDER — FENTANYL CITRATE 50 UG/ML
INJECTION, SOLUTION INTRAMUSCULAR; INTRAVENOUS AS NEEDED
Status: DISCONTINUED | OUTPATIENT
Start: 2025-05-26 | End: 2025-05-26 | Stop reason: SURG

## 2025-05-26 RX ORDER — SODIUM CHLORIDE, SODIUM LACTATE, POTASSIUM CHLORIDE, CALCIUM CHLORIDE 600; 310; 30; 20 MG/100ML; MG/100ML; MG/100ML; MG/100ML
INJECTION, SOLUTION INTRAVENOUS CONTINUOUS PRN
Status: DISCONTINUED | OUTPATIENT
Start: 2025-05-26 | End: 2025-05-26 | Stop reason: SURG

## 2025-05-26 RX ORDER — OXYCODONE AND ACETAMINOPHEN 10; 325 MG/1; MG/1
1 TABLET ORAL EVERY 4 HOURS PRN
Status: DISCONTINUED | OUTPATIENT
Start: 2025-05-26 | End: 2025-05-26 | Stop reason: HOSPADM

## 2025-05-26 RX ORDER — ZOLPIDEM TARTRATE 5 MG/1
5 TABLET ORAL NIGHTLY PRN
Status: DISCONTINUED | OUTPATIENT
Start: 2025-05-26 | End: 2025-05-28 | Stop reason: HOSPADM

## 2025-05-26 RX ORDER — SODIUM CHLORIDE 9 MG/ML
75 INJECTION, SOLUTION INTRAVENOUS CONTINUOUS
Status: DISCONTINUED | OUTPATIENT
Start: 2025-05-26 | End: 2025-05-28 | Stop reason: HOSPADM

## 2025-05-26 RX ORDER — ONDANSETRON 2 MG/ML
4 INJECTION INTRAMUSCULAR; INTRAVENOUS EVERY 6 HOURS PRN
Status: DISCONTINUED | OUTPATIENT
Start: 2025-05-26 | End: 2025-05-28 | Stop reason: HOSPADM

## 2025-05-26 RX ORDER — ATORVASTATIN CALCIUM 10 MG/1
20 TABLET, FILM COATED ORAL NIGHTLY
Status: DISCONTINUED | OUTPATIENT
Start: 2025-05-26 | End: 2025-05-28 | Stop reason: HOSPADM

## 2025-05-26 RX ORDER — SODIUM CHLORIDE 9 MG/ML
40 INJECTION, SOLUTION INTRAVENOUS AS NEEDED
Status: DISCONTINUED | OUTPATIENT
Start: 2025-05-26 | End: 2025-05-28 | Stop reason: HOSPADM

## 2025-05-26 RX ORDER — BISACODYL 10 MG
10 SUPPOSITORY, RECTAL RECTAL DAILY PRN
Status: DISCONTINUED | OUTPATIENT
Start: 2025-05-26 | End: 2025-05-28 | Stop reason: HOSPADM

## 2025-05-26 RX ORDER — SODIUM CHLORIDE 0.9 % (FLUSH) 0.9 %
10 SYRINGE (ML) INJECTION AS NEEDED
Status: DISCONTINUED | OUTPATIENT
Start: 2025-05-26 | End: 2025-05-28 | Stop reason: HOSPADM

## 2025-05-26 RX ORDER — OXYCODONE AND ACETAMINOPHEN 7.5; 325 MG/1; MG/1
1 TABLET ORAL EVERY 4 HOURS PRN
Status: DISCONTINUED | OUTPATIENT
Start: 2025-05-26 | End: 2025-05-28 | Stop reason: HOSPADM

## 2025-05-26 RX ORDER — HYDROMORPHONE HYDROCHLORIDE 1 MG/ML
0.5 INJECTION, SOLUTION INTRAMUSCULAR; INTRAVENOUS; SUBCUTANEOUS
Status: DISCONTINUED | OUTPATIENT
Start: 2025-05-26 | End: 2025-05-26 | Stop reason: HOSPADM

## 2025-05-26 RX ORDER — ALLOPURINOL 300 MG/1
300 TABLET ORAL NIGHTLY
Status: DISCONTINUED | OUTPATIENT
Start: 2025-05-26 | End: 2025-05-28 | Stop reason: HOSPADM

## 2025-05-26 RX ORDER — FENTANYL CITRATE 50 UG/ML
50 INJECTION, SOLUTION INTRAMUSCULAR; INTRAVENOUS
Status: DISCONTINUED | OUTPATIENT
Start: 2025-05-26 | End: 2025-05-26 | Stop reason: HOSPADM

## 2025-05-26 RX ORDER — METOPROLOL SUCCINATE 100 MG/1
100 TABLET, EXTENDED RELEASE ORAL DAILY
Status: DISCONTINUED | OUTPATIENT
Start: 2025-05-26 | End: 2025-05-28 | Stop reason: HOSPADM

## 2025-05-26 RX ORDER — FLUMAZENIL 0.1 MG/ML
0.2 INJECTION INTRAVENOUS AS NEEDED
Status: DISCONTINUED | OUTPATIENT
Start: 2025-05-26 | End: 2025-05-26 | Stop reason: HOSPADM

## 2025-05-26 RX ORDER — BACITRACIN ZINC 500 [USP'U]/G
OINTMENT TOPICAL AS NEEDED
Status: DISCONTINUED | OUTPATIENT
Start: 2025-05-26 | End: 2025-05-26 | Stop reason: HOSPADM

## 2025-05-26 RX ORDER — FAMOTIDINE 20 MG/1
40 TABLET, FILM COATED ORAL NIGHTLY
Status: DISCONTINUED | OUTPATIENT
Start: 2025-05-26 | End: 2025-05-28 | Stop reason: HOSPADM

## 2025-05-26 RX ORDER — ONDANSETRON 2 MG/ML
INJECTION INTRAMUSCULAR; INTRAVENOUS AS NEEDED
Status: DISCONTINUED | OUTPATIENT
Start: 2025-05-26 | End: 2025-05-26 | Stop reason: SURG

## 2025-05-26 RX ORDER — POLYETHYLENE GLYCOL 3350 17 G/17G
17 POWDER, FOR SOLUTION ORAL DAILY PRN
Status: DISCONTINUED | OUTPATIENT
Start: 2025-05-26 | End: 2025-05-28 | Stop reason: HOSPADM

## 2025-05-26 RX ORDER — ASPIRIN 81 MG/1
81 TABLET ORAL DAILY
Status: DISCONTINUED | OUTPATIENT
Start: 2025-05-26 | End: 2025-05-28 | Stop reason: HOSPADM

## 2025-05-26 RX ORDER — BUPIVACAINE HCL/EPINEPHRINE 0.25-.0005
VIAL (ML) INJECTION AS NEEDED
Status: DISCONTINUED | OUTPATIENT
Start: 2025-05-26 | End: 2025-05-26 | Stop reason: HOSPADM

## 2025-05-26 RX ORDER — AMLODIPINE BESYLATE 5 MG/1
5 TABLET ORAL DAILY
Status: DISCONTINUED | OUTPATIENT
Start: 2025-05-26 | End: 2025-05-28 | Stop reason: HOSPADM

## 2025-05-26 RX ORDER — LISINOPRIL 20 MG/1
40 TABLET ORAL DAILY
Status: DISCONTINUED | OUTPATIENT
Start: 2025-05-26 | End: 2025-05-28 | Stop reason: HOSPADM

## 2025-05-26 RX ORDER — PHENYLEPHRINE HCL IN 0.9% NACL 1 MG/10 ML
SYRINGE (ML) INTRAVENOUS AS NEEDED
Status: DISCONTINUED | OUTPATIENT
Start: 2025-05-26 | End: 2025-05-26 | Stop reason: SURG

## 2025-05-26 RX ORDER — NALOXONE HCL 0.4 MG/ML
0.4 VIAL (ML) INJECTION
Status: DISCONTINUED | OUTPATIENT
Start: 2025-05-26 | End: 2025-05-28 | Stop reason: HOSPADM

## 2025-05-26 RX ORDER — AMOXICILLIN 250 MG
2 CAPSULE ORAL 2 TIMES DAILY
Status: DISCONTINUED | OUTPATIENT
Start: 2025-05-26 | End: 2025-05-28 | Stop reason: HOSPADM

## 2025-05-26 RX ORDER — ACETAMINOPHEN 325 MG/1
650 TABLET ORAL EVERY 8 HOURS
Status: DISCONTINUED | OUTPATIENT
Start: 2025-05-26 | End: 2025-05-28 | Stop reason: HOSPADM

## 2025-05-26 RX ORDER — NALOXONE HCL 0.4 MG/ML
0.04 VIAL (ML) INJECTION AS NEEDED
Status: DISCONTINUED | OUTPATIENT
Start: 2025-05-26 | End: 2025-05-26 | Stop reason: HOSPADM

## 2025-05-26 RX ORDER — EPHEDRINE SULFATE 50 MG/ML
INJECTION, SOLUTION INTRAVENOUS AS NEEDED
Status: DISCONTINUED | OUTPATIENT
Start: 2025-05-26 | End: 2025-05-26 | Stop reason: SURG

## 2025-05-26 RX ORDER — MORPHINE SULFATE 2 MG/ML
2 INJECTION, SOLUTION INTRAMUSCULAR; INTRAVENOUS
Status: DISCONTINUED | OUTPATIENT
Start: 2025-05-26 | End: 2025-05-28 | Stop reason: HOSPADM

## 2025-05-26 RX ORDER — PROPOFOL 10 MG/ML
VIAL (ML) INTRAVENOUS AS NEEDED
Status: DISCONTINUED | OUTPATIENT
Start: 2025-05-26 | End: 2025-05-26 | Stop reason: SURG

## 2025-05-26 RX ADMIN — EPHEDRINE SULFATE 10 MG: 50 INJECTION, SOLUTION INTRAVENOUS at 11:39

## 2025-05-26 RX ADMIN — SUGAMMADEX 200 MG: 100 INJECTION, SOLUTION INTRAVENOUS at 13:53

## 2025-05-26 RX ADMIN — ACETAMINOPHEN 650 MG: 325 TABLET ORAL at 15:13

## 2025-05-26 RX ADMIN — EPHEDRINE SULFATE 25 MG: 50 INJECTION, SOLUTION INTRAVENOUS at 11:52

## 2025-05-26 RX ADMIN — Medication 100 MCG: at 13:18

## 2025-05-26 RX ADMIN — SODIUM CHLORIDE 75 ML/HR: 9 INJECTION, SOLUTION INTRAVENOUS at 16:20

## 2025-05-26 RX ADMIN — ATORVASTATIN CALCIUM 20 MG: 10 TABLET, FILM COATED ORAL at 20:14

## 2025-05-26 RX ADMIN — ALLOPURINOL 300 MG: 300 TABLET ORAL at 20:14

## 2025-05-26 RX ADMIN — EPHEDRINE SULFATE 15 MG: 50 INJECTION, SOLUTION INTRAVENOUS at 11:58

## 2025-05-26 RX ADMIN — ASPIRIN 81 MG: 81 TABLET, COATED ORAL at 15:14

## 2025-05-26 RX ADMIN — ROCURONIUM BROMIDE 50 MG: 10 INJECTION, SOLUTION INTRAVENOUS at 12:10

## 2025-05-26 RX ADMIN — SODIUM CHLORIDE, POTASSIUM CHLORIDE, SODIUM LACTATE AND CALCIUM CHLORIDE: 600; 310; 30; 20 INJECTION, SOLUTION INTRAVENOUS at 11:22

## 2025-05-26 RX ADMIN — Medication 200 MCG: at 12:03

## 2025-05-26 RX ADMIN — OXYCODONE HYDROCHLORIDE AND ACETAMINOPHEN 1 TABLET: 7.5; 325 TABLET ORAL at 15:13

## 2025-05-26 RX ADMIN — OXYCODONE HYDROCHLORIDE AND ACETAMINOPHEN 1 TABLET: 7.5; 325 TABLET ORAL at 20:14

## 2025-05-26 RX ADMIN — HYDROMORPHONE HYDROCHLORIDE 1 MG: 1 INJECTION, SOLUTION INTRAMUSCULAR; INTRAVENOUS; SUBCUTANEOUS at 13:57

## 2025-05-26 RX ADMIN — ACETAMINOPHEN 650 MG: 325 TABLET ORAL at 23:35

## 2025-05-26 RX ADMIN — Medication 200 MCG: at 12:18

## 2025-05-26 RX ADMIN — Medication 200 MCG: at 12:07

## 2025-05-26 RX ADMIN — CEFAZOLIN 2000 MG: 2 INJECTION, POWDER, FOR SOLUTION INTRAMUSCULAR; INTRAVENOUS at 20:14

## 2025-05-26 RX ADMIN — FAMOTIDINE 40 MG: 20 TABLET, FILM COATED ORAL at 20:14

## 2025-05-26 RX ADMIN — ROCURONIUM BROMIDE 20 MG: 10 INJECTION, SOLUTION INTRAVENOUS at 13:36

## 2025-05-26 RX ADMIN — Medication 100 MCG: at 12:21

## 2025-05-26 RX ADMIN — AMLODIPINE BESYLATE 5 MG: 5 TABLET ORAL at 15:14

## 2025-05-26 RX ADMIN — METOPROLOL SUCCINATE 100 MG: 100 TABLET, EXTENDED RELEASE ORAL at 15:13

## 2025-05-26 RX ADMIN — GLYCOPYRROLATE 0.15 MG: 0.2 INJECTION INTRAMUSCULAR; INTRAVENOUS at 11:23

## 2025-05-26 RX ADMIN — FENTANYL CITRATE 100 MCG: 50 INJECTION, SOLUTION INTRAMUSCULAR; INTRAVENOUS at 11:26

## 2025-05-26 RX ADMIN — CEFAZOLIN 2 G: 1 INJECTION, POWDER, FOR SOLUTION INTRAMUSCULAR; INTRAVENOUS at 12:07

## 2025-05-26 RX ADMIN — ROCURONIUM BROMIDE 50 MG: 10 INJECTION, SOLUTION INTRAVENOUS at 11:30

## 2025-05-26 RX ADMIN — Medication 100 MCG: at 13:03

## 2025-05-26 RX ADMIN — Medication 3 ML: at 20:15

## 2025-05-26 RX ADMIN — ONDANSETRON 4 MG: 2 INJECTION INTRAMUSCULAR; INTRAVENOUS at 13:44

## 2025-05-26 RX ADMIN — LISINOPRIL 40 MG: 20 TABLET ORAL at 15:14

## 2025-05-26 RX ADMIN — SODIUM CHLORIDE, SODIUM LACTATE, POTASSIUM CHLORIDE, CALCIUM CHLORIDE: 600; 310; 30; 20 INJECTION, SOLUTION INTRAVENOUS at 11:22

## 2025-05-26 RX ADMIN — PROPOFOL 125 MG: 10 INJECTION, EMULSION INTRAVENOUS at 11:30

## 2025-05-26 NOTE — ED PROVIDER NOTES
Subjective   History of Present Illness  Patient says that he jumped out of bed yesterday morning to go to the bathroom and tripped over a cat that next to his bed and fell.  He hit his head on the closet door.  And hyperextended his neck.  He hit his face on the cabinet.  Since that time his biggest complaint is pain in his neck.  He says whenever he gets up or moves around somebody has to hold his head and body together so that does not move.  He does complain of some tenderness in his scalp and decided to get checked out.    History provided by:  Patient   used: No    Fall  Mechanism of injury: fall    Injury location:  Head/neck and face  Head/neck injury location:  Head, L neck and R neck  Facial injury location:  L cheek  Incident location:  Home  Time since incident:  1 day  Arrived directly from scene: no    Fall:     Fall occurred:  Tripped    Impact surface:  Furniture    Point of impact:  Head    Entrapped after fall: no    Protective equipment: none    Suspicion of alcohol use: no    Suspicion of drug use: no    Tetanus status:  Unknown  Prior to arrival data:     Bystander interventions:  None    Patient ambulatory at scene: yes      Blood loss:  None    Responsiveness at scene:  Alert    Orientation at scene:  Person, situation, place and time    Loss of consciousness: Uncertain.      Amnesic to event: no      Airway interventions:  None    Breathing interventions:  None    IV access status:  None    IO access:  None    Fluids administered:  None    Cardiac interventions:  None    Medications administered:  None    Immobilization:  None    Airway condition since incident:  Stable    Breathing condition since incident:  Stable    Circulation condition since incident:  Stable    Mental status condition since incident:  Stable    Disability condition since incident:  Stable  Associated symptoms: headaches and neck pain    Associated symptoms: no abdominal pain, no back pain, no  blindness, no chest pain, no difficulty breathing, no hearing loss, no loss of consciousness, no nausea, no seizures and no vomiting    Risk factors: no AICD, no anticoagulation therapy, no asthma, no beta blocker therapy, no CABG, no CAD, no CHF, no COPD, no diabetes, no dialysis, no hemophilia, no kidney disease, no pacemaker, no past MI, not pregnant and no steroid use        Review of Systems   Constitutional: Negative.    HENT:  Negative for hearing loss.    Eyes:  Negative for blindness.   Respiratory: Negative.     Cardiovascular: Negative.  Negative for chest pain.   Gastrointestinal: Negative.  Negative for abdominal pain, nausea and vomiting.   Genitourinary: Negative.    Musculoskeletal:  Positive for neck pain. Negative for back pain.   Skin: Negative.    Neurological:  Positive for headaches. Negative for seizures and loss of consciousness.   Psychiatric/Behavioral: Negative.     All other systems reviewed and are negative.      Past Medical History:   Diagnosis Date    Abnormal ECG     Aneurysm     Arrhythmia     Asthma     Atrial fibrillation     COPD (chronic obstructive pulmonary disease)     Elevated cholesterol     GERD (gastroesophageal reflux disease)     Hyperlipidemia     Hypertension     Type 2 diabetes mellitus 12/09/2021       No Known Allergies    Past Surgical History:   Procedure Laterality Date    ABDOMINAL AORTIC ANEURYSM REPAIR WITH ENDOGRAFT N/A 11/10/2021    Procedure: ABDOMINAL AORTIC ANEURYSM REPAIR WITH ENDOGRAFT;  Surgeon: Khai Reynolds DO;  Location:  [facility];  Service: Vascular;  Laterality: N/A;    AORTOGRAM N/A 12/15/2021    Procedure: AORTOILIAC ANGIOGRAM, BILATERAL ILIAC BALLOON ANGIOPLASTY, MYNX CLOSURE;  Surgeon: Khai Reynolds DO;  Location:  [facility];  Service: Vascular;  Laterality: N/A;    APPENDECTOMY      ATRIAL APPENDAGE EXCLUSION LEFT WITH TRANSESOPHAGEAL ECHOCARDIOGRAM Right 5/9/2023    Procedure: Atrial Appendage Occlusion;   Surgeon: Ihsan Zuniga MD;  Location:  [facility];  Service: Cardiology;  Laterality: Right;    CARDIAC ELECTROPHYSIOLOGY PROCEDURE N/A 12/4/2024    Procedure: Ablation atrial fibrillation - PFA;  Surgeon: Sea Howe MD;  Location:  [facility];  Service: Cardiovascular;  Laterality: N/A;       Family History   Problem Relation Age of Onset    Heart attack Father     Colon polyps Neg Hx     Esophageal cancer Neg Hx     Colon cancer Neg Hx        Social History     Socioeconomic History    Marital status:    Tobacco Use    Smoking status: Every Day     Current packs/day: 1.00     Average packs/day: 1 pack/day for 54.4 years (54.4 ttl pk-yrs)     Types: Cigarettes     Start date: 1/1/1971    Smokeless tobacco: Never    Tobacco comments:     SMOKES 6-8 CIG/DAY,  ROLLS HIS OWN   Vaping Use    Vaping status: Never Used   Substance and Sexual Activity    Alcohol use: Yes     Comment: rarely    Drug use: Yes     Types: Marijuana    Sexual activity: Defer       Prior to Admission medications    Medication Sig Start Date End Date Taking? Authorizing Provider   albuterol (PROVENTIL) (2.5 MG/3ML) 0.083% nebulizer solution Take 2.5 mg by nebulization Every 4 (Four) Hours As Needed for Wheezing. 9/25/24   Maurice Caraballo MD   allopurinol (ZYLOPRIM) 300 MG tablet Take 1 tablet by mouth Every Night.    ProviderEdward MD   amLODIPine (NORVASC) 5 MG tablet TAKE 1 TABLET BY MOUTH DAILY. 5/5/25   Maurice Caraballo MD   aspirin 81 MG EC tablet Take 1 tablet by mouth Daily. 4/2/25   Valdo Manrique APRN   atorvastatin (LIPITOR) 20 MG tablet Take 1 tablet by mouth Every Night.    ProviderEdward MD   eszopiclone (Lunesta) 3 MG tablet Take 1 tablet by mouth Every Night. Take immediately before bedtime 2/25/25   Maurice Caraballo MD   famotidine (PEPCID) 40 MG tablet Take 1 tablet by mouth Every Night. 12/19/24   Maurice Caraballo MD   lisinopril (PRINIVIL,ZESTRIL) 40 MG tablet Take 1  tablet by mouth Daily. 4/1/25   Maurice Caraballo MD   metoprolol succinate XL (TOPROL-XL) 100 MG 24 hr tablet Take 1 tablet by mouth Daily. 11/2/21   ProviderEdward MD   omeprazole (priLOSEC) 40 MG capsule Take 1 capsule by mouth Every Morning.    ProviderEdward MD       Medications   sodium chloride 0.9 % flush 10 mL (has no administration in time range)       Vitals:    05/26/25 0929   BP:    Pulse: 71   Resp:    Temp:    SpO2: 94%         Objective   Physical Exam  Vitals and nursing note reviewed.   Constitutional:       Appearance: Normal appearance.   HENT:      Head: Normocephalic.      Comments: Patient does have some tenderness and slight scalp swelling in the occipital scalp.  Eyes:      Extraocular Movements: Extraocular movements intact.      Pupils: Pupils are equal, round, and reactive to light.      Comments: Patient does have bruising over the left lower orbit area.   Cardiovascular:      Rate and Rhythm: Normal rate and regular rhythm.   Pulmonary:      Effort: Pulmonary effort is normal.      Breath sounds: Normal breath sounds.   Abdominal:      General: Abdomen is flat.      Palpations: Abdomen is soft.   Musculoskeletal:         General: Normal range of motion.   Skin:     General: Skin is warm and dry.   Neurological:      General: No focal deficit present.      Mental Status: He is alert and oriented to person, place, and time.   Psychiatric:         Mood and Affect: Mood normal.         Behavior: Behavior normal.         Procedures         Lab Results (last 24 hours)       ** No results found for the last 24 hours. **            CT Head Without Contrast   Final Result   Impression:         No acute intracranial abnormality.       This report was signed and finalized on 5/26/2025 8:33 AM by Gurdeep Pavon.          CT Facial Bones Without Contrast   Final Result       1. Odontoid fracture as described on separate report.   2. No acute facial fracture.       Findings and  recommendations were discussed with RAGHU SIN JR on   5/26/2025 8:40 AM at 5/26/2025 8:40 AM.               This report was signed and finalized on 5/26/2025 8:40 AM by Gurdeep Pavon.          CT Cervical Spine Without Contrast   Final Result       1. Acute displaced type II/type III odontoid fracture.   2. Symmetric nondisplaced lucencies involving the posterior arch of C1.   Suspicion is that these represent fractures in the setting of trauma,   however are limited in evaluation due to motion.       Findings and recommendations were discussed with RAGHU SIN JR on   5/26/2025 8:52 AM at 5/26/2025 8:52 AM.               This report was signed and finalized on 5/26/2025 8:52 AM by Gurdeep Pavon.          XR Chest 1 View    (Results Pending)       ED Course          MDM  Number of Diagnoses or Management Options  Closed odontoid fracture, initial encounter: new and requires workup  Diagnosis management comments: Patient does have an odontoid fracture.  I spoke with Dr. Barrett of neurosurgery and he has examined the patient.  He is taking him to surgery now.       Amount and/or Complexity of Data Reviewed  Clinical lab tests: ordered and reviewed  Tests in the radiology section of CPT®: ordered and reviewed  Discuss the patient with other providers: yes    Risk of Complications, Morbidity, and/or Mortality  Presenting problems: moderate  Diagnostic procedures: moderate  Management options: moderate    Patient Progress  Patient progress: stable        Final diagnoses:   Closed odontoid fracture, initial encounter          Raghu Sin Jr., MD  05/26/25 1004       Raghu Sin Jr., MD  05/26/25 1001

## 2025-05-26 NOTE — ANESTHESIA PROCEDURE NOTES
Airway  Reason: elective    Date/Time: 5/26/2025 11:35 AM  Airway not difficult    General Information and Staff    Patient location during procedure: OR  CRNA/CAA: Jt Cruz CRNA    Indications and Patient Condition  Indications for airway management: airway protection    Preoxygenated: yes  MILS maintained throughout    Mask difficulty assessment: 1 - vent by mask    Final Airway Details    Final airway type: endotracheal airway      Successful airway: ETT  Cuffed: yes   Successful intubation technique: video laryngoscopy  Adjuncts used in placement: intubating stylet  Endotracheal tube insertion site: oral  Blade: Chambers  Blade size: 4  ETT size (mm): 7.5  Cormack-Lehane Classification: grade I - full view of glottis  Placement verified by: chest auscultation and capnometry   Cuff volume (mL): 5  Measured from: gums  ETT/EBT to gums (cm): 21  Number of attempts at approach: 1  Assessment: lips, teeth, and gum same as pre-op and atraumatic intubation    Additional Comments  Surgeon at bedside.front of c-collar removed for intubation. Neck remained inline during DL

## 2025-05-26 NOTE — H&P
Date of Admission: 5/26/2025  Primary Care Physician: Maurice Caraballo MD        Subjective:    Chief complaint odontoid fracture    HPI: 69-year-old gentleman who fell early Sunday morning at home striking the front of his head and face.  He immediately had neck pain.  He does feel he may have had a short loss of consciousness.  He denies any upper extremity or lower extremity pain numbness or tingling since the accident.  The following day he had a significant neck pain required assistance from a friend to stabilize his neck while he moved around.  He was able to walk without any difficulty other than the neck pain.  This morning he felt it was not improving so he called 911 as brought to the emergency room.    Review of Systems   Musculoskeletal:  Positive for neck pain.   All other systems reviewed and are negative.       Pertinent positives/negatives documented in HPI.  All other systems reviewed and negative.    Past Medical History:   Past Medical History:   Diagnosis Date    Abnormal ECG     Aneurysm     Arrhythmia     Asthma     Atrial fibrillation     COPD (chronic obstructive pulmonary disease)     Elevated cholesterol     GERD (gastroesophageal reflux disease)     Hyperlipidemia     Hypertension     Type 2 diabetes mellitus 12/09/2021       Past Surgical History:   Past Surgical History:   Procedure Laterality Date    ABDOMINAL AORTIC ANEURYSM REPAIR WITH ENDOGRAFT N/A 11/10/2021    Procedure: ABDOMINAL AORTIC ANEURYSM REPAIR WITH ENDOGRAFT;  Surgeon: Khai Reynolds DO;  Location:  [facility];  Service: Vascular;  Laterality: N/A;    AORTOGRAM N/A 12/15/2021    Procedure: AORTOILIAC ANGIOGRAM, BILATERAL ILIAC BALLOON ANGIOPLASTY, MYNX CLOSURE;  Surgeon: Khai Reynolds DO;  Location:  [facility];  Service: Vascular;  Laterality: N/A;    APPENDECTOMY      ATRIAL APPENDAGE EXCLUSION LEFT WITH TRANSESOPHAGEAL ECHOCARDIOGRAM Right 5/9/2023    Procedure: Atrial Appendage Occlusion;   Surgeon: Ihsan Zuniga MD;  Location:  [facility];  Service: Cardiology;  Laterality: Right;    CARDIAC ELECTROPHYSIOLOGY PROCEDURE N/A 12/4/2024    Procedure: Ablation atrial fibrillation - PFA;  Surgeon: Sea Howe MD;  Location:  [facility];  Service: Cardiovascular;  Laterality: N/A;       Family History: family history includes Heart attack in his father.    Social History:  reports that he has been smoking cigarettes. He started smoking about 54 years ago. He has a 54.4 pack-year smoking history. He has never used smokeless tobacco. He reports current alcohol use. He reports current drug use. Drug: Marijuana.    Code Status: Full    Medications:  (Not in a hospital admission)      Allergies:  No Known Allergies    Objective:  Physical Exam  Constitutional:       General: He is awake.   Eyes:      Extraocular Movements: Extraocular movements intact.      Pupils: Pupils are equal, round, and reactive to light.   Cardiovascular:      Rate and Rhythm: Normal rate and regular rhythm.   Pulmonary:      Effort: Pulmonary effort is normal.   Abdominal:      General: There is no distension.      Palpations: Abdomen is soft.   Neurological:      Motor: Motor strength is normal.     Deep Tendon Reflexes: Reflexes are normal and symmetric.   Psychiatric:         Speech: Speech normal.         Neurological Exam  Mental Status  Awake. Oriented to person, place and time. Speech is normal. Language is fluent with no aphasia. Attention and concentration are normal.    Cranial Nerves  CN I: Sense of smell is normal.  CN II: Right normal visual field. Left normal visual field.  CN III, IV, VI: Extraocular movements intact bilaterally. Pupils equal round and reactive to light bilaterally.  CN V: Facial sensation is normal.  CN VII:  Right: There is no facial weakness.  Left: There is no facial weakness.  CN XI: Shoulder shrug strength is normal.  CN XII: Tongue midline without atrophy or  fasciculations.    Motor  Normal muscle bulk throughout. Normal muscle tone. Strength is 5/5 throughout all four extremities.    Sensory  Sensation is intact to light touch, pinprick, vibration and proprioception in all four extremities.    Reflexes  Deep tendon reflexes are 2+ and symmetric in all four extremities.      Vital Signs  Temp:  [98.1 °F (36.7 °C)] 98.1 °F (36.7 °C)  Heart Rate:  [66-74] 71  Resp:  [16] 16  BP: (142-149)/(84-95) 142/95        Results Review:  I reviewed the patient's new clinical results.  I reviewed the patient's new imaging results and agree with the interpretation.  I reviewed the patient's other test results and agree with the interpretation         Lab Results (last 24 hours)       ** No results found for the last 24 hours. **            Imaging Results (Last 24 Hours)       Procedure Component Value Units Date/Time    CT Cervical Spine Without Contrast [[id number]] Collected: 05/26/25 0840     Updated: 05/26/25 0855    Narrative:      Exam: CT CERVICAL SPINE WO CONTRAST-      HISTORY: fall, neck pain     COMPARISON: None      DOSE LENGTH PRODUCT: 1307.81 mGy.cm mGy cm. Automated exposure control  was also utilized to decrease patient radiation dose.     TECHNIQUE: Serial helical tomographic images of the cervical spine were  obtained without the use of intravenous contrast. Additionally, sagittal  and coronal reformatted images were also provided for review.     FINDINGS:     There is a displaced type II/type III odontoid fracture with  approximately 6 mm anterior translation of the distal portion. There are  somewhat symmetric lucencies extending through the posterior arch of C1  (series 4 image 14, 13) which is further limited in evaluation due to  motion.     Straightening of the cervical lordosis. Multilevel moderate to severe  degenerative disc disease and facet arthropathy. Suspect mild to  moderate C5-C6 and to a lesser extent C6-C7 spinal canal narrowing.  Variable degree of  foraminal narrowing most notable with severe left  foraminal narrowing at C3-C4.       Impression:         1. Acute displaced type II/type III odontoid fracture.  2. Symmetric nondisplaced lucencies involving the posterior arch of C1.  Suspicion is that these represent fractures in the setting of trauma,  however are limited in evaluation due to motion.     Findings and recommendations were discussed with DAX SIN JR on  5/26/2025 8:52 AM at 5/26/2025 8:52 AM.           This report was signed and finalized on 5/26/2025 8:52 AM by Gurdeep Pavon.       CT Facial Bones Without Contrast [[id number]] Collected: 05/26/25 0834     Updated: 05/26/25 0843    Narrative:      Exam: CT FACIAL BONES WO CONTRAST-      HISTORY: fall, hit left orbit      COMPARISON: None      DOSE LENGTH PRODUCT: 1307.81 mGy.cm mGy cm. Automated exposure control  was also utilized to decrease patient radiation dose.     TECHNIQUE: Serial helical tomographic images of the facial bones were  obtained without contrast. Multiplanar reformatted images were provided  for review.     FINDINGS:     Osseous structures: Odontoid fracture will be described on separate  report. No additional facial fracture.     Orbits: Unremarkable.     Sinuses: Unremarkable.     Mastoids: Unremarkable.     Soft tissues: Unremarkable.     Other: None.        Impression:         1. Odontoid fracture as described on separate report.  2. No acute facial fracture.     Findings and recommendations were discussed with DAX SIN JR on  5/26/2025 8:40 AM at 5/26/2025 8:40 AM.           This report was signed and finalized on 5/26/2025 8:40 AM by Gurdeep Pavon.       CT Head Without Contrast [[id number]] Collected: 05/26/25 0831     Updated: 05/26/25 0836    Narrative:      Exam: CT HEAD WO CONTRAST-      HISTORY: fall, hit back of head       DOSE LENGTH PRODUCT: 1307.81 mGy.cm mGy cm. Automated exposure control  was also utilized to decrease patient radiation dose.      Technique:  Helically acquired CT of the brain without IV contrast was performed.  Sagittal and coronal reformations are also provided for review. Soft  tissue and bone kernels are available for interpretation.     Comparison: None.     Findings:     Ventricles and extra-axial CSF spaces are normal in size.     No intraparenchymal or extra-axial hemorrhage.     Gray-white matter differentiation is preserved.     Orbits are grossly unremarkable. Paranasal sinuses are grossly clear.  Mastoid air cells are grossly clear.     No suspicious calvarial or extracranial soft tissue abnormality.       Impression:      Impression:       No acute intracranial abnormality.     This report was signed and finalized on 5/26/2025 8:33 AM by Gurdeep Pavon.                      Assessment/Plan:   Type II odontoid fracture along with a C1 ring fracture there is posterior displacement at the fracture site along with the bilateral facet joints.  The patient would benefit from posterior cervical instrumentation and stabilization.  Risks and benefits were discussed which included but were not limited to infection, bleeding, paralysis, spinal fluid leak, bowel bladder incontinence, stroke, coma, and death.  In addition we also discussed the option of long-term bracing with the understanding that that may result in an unsuccessful healing of this fracture and require surgery down the road.  Patient Jacky understand this.  His questions concerns were addressed.    There are no active hospital problems to display for this patient.      I discussed the patient's findings and my recommendations with patient and consulting provider    Raghu Barrett MD  05/26/25  09:37 CDT    I spent 35 minutes caring for Teo on this date of service. This time includes time spent by me in the following activities: preparing for the visit, reviewing tests, performing a medically appropriate examination and/or evaluation, counseling and educating the  patient/family/caregiver, referring and communicating with other health care professionals, documenting information in the medical record, independently interpreting results and communicating that information with the patient/family/caregiver, care coordination, ordering medications, ordering procedure(s), obtaining a separately obtained history, and reviewing a separately obtained history

## 2025-05-26 NOTE — OP NOTE
Procedure Note  Preop Diagnosis: Closed odontoid fracture, initial encounter [S12.100A]    Post-Op Diagnosis Codes:     * Closed odontoid fracture, initial encounter [S12.100A]     Procedure Name: Posterior cervical fusion with autograft and allograft C1, 2, 3  Posterior instrumented fusion C1-3  Use of image guided stereotactic navigation  Use the surgical robot      Indications:  A CT scan revealed findings of displaced odontoid fracture. The patient now presents for stabilization and fusion after discussing therapeutic alternatives.          Surgeon: Raghu Barrett MD     Assistants: None    Anesthesia: General endotracheal anesthesia    ASA Class: 3    Procedure Details   After obtaining informed consent, having the risks and benefits of the procedure explained including but not limited to infection, bleeding, paralysis, spinal fluid leak, bowel bladder incontinence, stroke, coma, and death.  The patient was brought to the operating.  He was given general anesthesia via an endotracheal tube.  He was placed on 3-point Robles head harness.  Maintaining inline stabilization he was flipped prone onto a Madhu axis table.  His head was then anchored to the frame using the Robles head harness.  The posterior cervical area was then prepped and draped in the standard sterile fashion.  An O-arm navigation spin was then conducted.  A midline incision was then marked with indelible marker.  Was injected with Marcaine and epinephrine.  A 10 blade scalpel was used to make an incision the dermis epidermis.  Bovie cautery was used to extend the incision down to the nuchal line through the subcutaneous and soft tissues to the level of the spinous processes.  The musculature and connective tissue were then dissected off the spinous processes of C1, 2, 3 on the left and the right using Bovie cautery.  2 cerebellar retractors were then placed into the wound.  Image-guided navigation was then used to identify the lateral  masses at C1, 2, 3 on the left and the right.   holes were drilled at each level under image guided navigation.  Hand drill was then used to complete the channel through the lateral masses on the left and the right at each level.  Each level was then tapped under image guided navigation.  Then a series of image-guided screws were placed at C1, 2, 3 on the right and C1 and 3 on the left.  The wound was then copiously irrigated with antibiotic solution.  Is inspected for hemostasis.  The ring of C1 and lamina and facet joints of C2 and C3 were then decorticated using an electric drill.  Autograft and allograft material were then placed over the lamina and facet joints on the left and the right from C1-C3.  The deep tissues were then closed using a series of inverted interrupted 0 Vicryl sutures.  The subcutaneous and soft tissues were closed using a series of inverted erupted 2-0 Vicryl sutures.  The skin was closed using a running 4-0 Monocryl.  All sponge needle and instrument counts were correct at end procedure.  The patient was extubated in stable condition returned recovery with about 400 cc of blood loss.    Findings:  Odontoid fracture]    Estimated Blood Loss:   400           Drains: none           Total IV Fluids: ml           Specimens: None           Implants:   Implant Name Type Inv. Item Serial No.  Lot No. LRB No. Used Action   KT HEMOST ABS SURGIFOAM PORCN 1GRAM - [id number] Implant KT HEMOST ABS SURGIFOAM PORCN 1GRAM  [employer]  DIV [employer] J AND J [id number] N/A 3 Implanted   ALLOGRFT DBM MAGNIFUSE 1X5CM - [id number] - [id number] Implant ALLOGRFT DBM MAGNIFUSE 1X5CM [id number] [employer] NR N/A 1 Implanted   SCRW MAS SPINE INFINITY PT 3.5X32MM - [id number] Implant SCRW MAS SPINE INFINITY PT 3.5X32MM  [employer] NR N/A 2 Implanted   SCRW MAS INFINITY 3.5X32MM - [id number] Implant SCRW MAS INFINITY 3.5X32MM  [employer] NR N/A 1 Implanted   SCRW INFINITY MAS  3.5X20MM - [id number] Implant SCRW INFINITY MAS 3.5X20MM  [employer] NR N/A 1 Implanted and Explanted   SCRW MAS INFINITY 3.5X32MM - [id number] Implant SCRW MAS INFINITY 3.5X32MM  [employer] NR N/A 1 Implanted   SCRW INFINITY MAS 3.5X16MM - [id number] Implant SCRW INFINITY MAS 3.5X16MM  [employer] NR N/A 1 Implanted   SCRW INFINITY MAS 3.5X18MM - [id number] Implant SCRW INFINITY MAS 3.5X18MM  [employer] NR N/A 1 Implanted   SIENNA P/C INFINITY 3.5X40MM - [id number] Implant SIENNA P/C INFINITY 3.5X40MM  [employer] NR N/A 1 Implanted   SIENNA SPINE INFINITY P/C 3.5X50MM - [id number] Implant SIENNA SPINE INFINITY P/C 3.5X50MM  [employer] NR N/A 1 Implanted   SCRW ST OCT INFINITY THRD - [id number] Implant SCRW ST OCT INFINITY THRD  [employer] NR N/A 5 Implanted   PUTTY DBM MRA 2.5CC - [id number] - [id number] Implant PUTTY DBM MAR 2.5CC [id number] [employer] NR N/A 1 Implanted              Complications:  none           Disposition: PACU - hemodynamically stable.           Condition: stable        Raghu Barrett MD

## 2025-05-26 NOTE — PLAN OF CARE
Goal Outcome Evaluation:      Pt to floor from surgery this afternoon. Aox4. VSS on RA. Denies n/t. Pain managed with prn meds. Dressing to posterior neck CDI. Aspen collar in place. IVF infusing per orders. Family at bedside. Up standby to chair. No difficulty with swallowing or speaking. SCDs in place. Wasserman catheter in place and draining well. Safety maintained.

## 2025-05-26 NOTE — ANESTHESIA PREPROCEDURE EVALUATION
Anesthesia Evaluation     NPO Solid Status: > 8 hours  NPO Liquid Status: > 8 hours           Airway   Mallampati: II  TM distance: >3 FB  Neck ROM: limited  No difficulty expected  Dental    (+) edentulous    Pulmonary    (+) a smoker Current, COPD, asthma,  Cardiovascular   Exercise tolerance: poor (<4 METS)    ECG reviewed  Beta blocker given within 24 hours of surgery    (+) hypertension, dysrhythmias Atrial Fib, hyperlipidemia      Neuro/Psych  GI/Hepatic/Renal/Endo    (+) GERD, diabetes mellitus    Musculoskeletal     Abdominal    Substance History      OB/GYN negative ob/gyn ROS         Other   arthritis,                 Anesthesia Plan    ASA 3 - emergent     general     (Surgeon ok'd removing front of rigid c-collar just maintain inline stabilization.   )  intravenous induction     Anesthetic plan, risks, benefits, and alternatives have been provided, discussed and informed consent has been obtained with: patient.    Use of blood products discussed with patient .      CODE STATUS:

## 2025-05-27 LAB
ANION GAP SERPL CALCULATED.3IONS-SCNC: 10 MMOL/L (ref 5–15)
BASOPHILS # BLD AUTO: 0.05 10*3/MM3 (ref 0–0.2)
BASOPHILS NFR BLD AUTO: 0.5 % (ref 0–1.5)
BUN SERPL-MCNC: 9 MG/DL (ref 8–23)
BUN/CREAT SERPL: 14.3 (ref 7–25)
CALCIUM SPEC-SCNC: 8 MG/DL (ref 8.6–10.5)
CHLORIDE SERPL-SCNC: 105 MMOL/L (ref 98–107)
CO2 SERPL-SCNC: 25 MMOL/L (ref 22–29)
CREAT SERPL-MCNC: 0.63 MG/DL (ref 0.76–1.27)
DEPRECATED RDW RBC AUTO: 50.1 FL (ref 37–54)
EGFRCR SERPLBLD CKD-EPI 2021: 104.3 ML/MIN/1.73
EOSINOPHIL # BLD AUTO: 0.23 10*3/MM3 (ref 0–0.4)
EOSINOPHIL NFR BLD AUTO: 2.3 % (ref 0.3–6.2)
ERYTHROCYTE [DISTWIDTH] IN BLOOD BY AUTOMATED COUNT: 13.2 % (ref 12.3–15.4)
GLUCOSE SERPL-MCNC: 122 MG/DL (ref 65–99)
HCT VFR BLD AUTO: 37.5 % (ref 37.5–51)
HGB BLD-MCNC: 12.2 G/DL (ref 13–17.7)
IMM GRANULOCYTES # BLD AUTO: 0.04 10*3/MM3 (ref 0–0.05)
IMM GRANULOCYTES NFR BLD AUTO: 0.4 % (ref 0–0.5)
LYMPHOCYTES # BLD AUTO: 1.4 10*3/MM3 (ref 0.7–3.1)
LYMPHOCYTES NFR BLD AUTO: 14.3 % (ref 19.6–45.3)
MCH RBC QN AUTO: 33.2 PG (ref 26.6–33)
MCHC RBC AUTO-ENTMCNC: 32.5 G/DL (ref 31.5–35.7)
MCV RBC AUTO: 101.9 FL (ref 79–97)
MONOCYTES # BLD AUTO: 1.3 10*3/MM3 (ref 0.1–0.9)
MONOCYTES NFR BLD AUTO: 13.3 % (ref 5–12)
NEUTROPHILS NFR BLD AUTO: 6.78 10*3/MM3 (ref 1.7–7)
NEUTROPHILS NFR BLD AUTO: 69.2 % (ref 42.7–76)
NRBC BLD AUTO-RTO: 0 /100 WBC (ref 0–0.2)
PLATELET # BLD AUTO: 168 10*3/MM3 (ref 140–450)
PMV BLD AUTO: 9.8 FL (ref 6–12)
POTASSIUM SERPL-SCNC: 3.7 MMOL/L (ref 3.5–5.2)
RBC # BLD AUTO: 3.68 10*6/MM3 (ref 4.14–5.8)
SODIUM SERPL-SCNC: 140 MMOL/L (ref 136–145)
WBC NRBC COR # BLD AUTO: 9.8 10*3/MM3 (ref 3.4–10.8)

## 2025-05-27 PROCEDURE — 97161 PT EVAL LOW COMPLEX 20 MIN: CPT | Performed by: PHYSICAL THERAPIST

## 2025-05-27 PROCEDURE — 25010000002 CEFAZOLIN PER 500 MG: Performed by: NEUROLOGICAL SURGERY

## 2025-05-27 PROCEDURE — 85025 COMPLETE CBC W/AUTO DIFF WBC: CPT | Performed by: NEUROLOGICAL SURGERY

## 2025-05-27 PROCEDURE — 97165 OT EVAL LOW COMPLEX 30 MIN: CPT

## 2025-05-27 PROCEDURE — 99024 POSTOP FOLLOW-UP VISIT: CPT | Performed by: NURSE PRACTITIONER

## 2025-05-27 PROCEDURE — 97116 GAIT TRAINING THERAPY: CPT

## 2025-05-27 PROCEDURE — 80048 BASIC METABOLIC PNL TOTAL CA: CPT | Performed by: NEUROLOGICAL SURGERY

## 2025-05-27 RX ORDER — ECHINACEA PURPUREA EXTRACT 125 MG
1 TABLET ORAL
Status: DISCONTINUED | OUTPATIENT
Start: 2025-05-27 | End: 2025-05-28 | Stop reason: HOSPADM

## 2025-05-27 RX ADMIN — ALLOPURINOL 300 MG: 300 TABLET ORAL at 20:59

## 2025-05-27 RX ADMIN — OXYCODONE HYDROCHLORIDE AND ACETAMINOPHEN 1 TABLET: 7.5; 325 TABLET ORAL at 03:04

## 2025-05-27 RX ADMIN — FAMOTIDINE 40 MG: 20 TABLET, FILM COATED ORAL at 20:59

## 2025-05-27 RX ADMIN — SALINE NASAL SPRAY 1 SPRAY: 1.5 SOLUTION NASAL at 13:09

## 2025-05-27 RX ADMIN — ACETAMINOPHEN 650 MG: 325 TABLET ORAL at 08:40

## 2025-05-27 RX ADMIN — AMLODIPINE BESYLATE 5 MG: 5 TABLET ORAL at 08:41

## 2025-05-27 RX ADMIN — DOCUSATE SODIUM 50 MG AND SENNOSIDES 8.6 MG 2 TABLET: 8.6; 5 TABLET, FILM COATED ORAL at 20:59

## 2025-05-27 RX ADMIN — Medication 3 ML: at 08:55

## 2025-05-27 RX ADMIN — ATORVASTATIN CALCIUM 20 MG: 10 TABLET, FILM COATED ORAL at 20:59

## 2025-05-27 RX ADMIN — OXYCODONE HYDROCHLORIDE AND ACETAMINOPHEN 1 TABLET: 7.5; 325 TABLET ORAL at 13:08

## 2025-05-27 RX ADMIN — METOPROLOL SUCCINATE 100 MG: 100 TABLET, EXTENDED RELEASE ORAL at 08:41

## 2025-05-27 RX ADMIN — PANTOPRAZOLE SODIUM 40 MG: 40 TABLET, DELAYED RELEASE ORAL at 05:17

## 2025-05-27 RX ADMIN — ZOLPIDEM TARTRATE 5 MG: 5 TABLET ORAL at 21:03

## 2025-05-27 RX ADMIN — CEFAZOLIN 2000 MG: 2 INJECTION, POWDER, FOR SOLUTION INTRAMUSCULAR; INTRAVENOUS at 05:17

## 2025-05-27 RX ADMIN — DOCUSATE SODIUM 50 MG AND SENNOSIDES 8.6 MG 2 TABLET: 8.6; 5 TABLET, FILM COATED ORAL at 08:41

## 2025-05-27 RX ADMIN — OXYCODONE HYDROCHLORIDE AND ACETAMINOPHEN 1 TABLET: 7.5; 325 TABLET ORAL at 18:05

## 2025-05-27 RX ADMIN — ASPIRIN 81 MG: 81 TABLET, COATED ORAL at 08:40

## 2025-05-27 RX ADMIN — LISINOPRIL 40 MG: 20 TABLET ORAL at 08:40

## 2025-05-27 RX ADMIN — CYCLOBENZAPRINE HYDROCHLORIDE 10 MG: 10 TABLET, FILM COATED ORAL at 08:40

## 2025-05-27 NOTE — PLAN OF CARE
Goal Outcome Evaluation:  Plan of Care Reviewed With: patient        Progress: improving  Outcome Evaluation: The patient presents alert and oriented x4 lying in bed with aspen collar in place. The patient lives alone and typically cares for himself. He now has spinal restrictions with a cervical collar. He demonstrates no focal neurological deficits in strength, sensation, or coordination. He is slightly unsteady during gait, but he was able to ambulate in the hallway. He benefit from continued PT to work on his balance and increased activity since he lives alone. Recommend discharge home.    Anticipated Discharge Disposition (PT): home

## 2025-05-27 NOTE — PLAN OF CARE
Goal Outcome Evaluation:  Plan of Care Reviewed With: patient        Progress: no change  Outcome Evaluation: OT eval completed.  Pt alert and oriented x4. Pt seen in fowlers. Pt c/o of pain in posterior neck/head but pleasant and agreeable to participating in eval. Pt educated on 3/3 spinal precautions with good understanding. Pt completed bed mobility with initial verbal cues for proper log rolling technique to follow spinal precautions requiring Tess with rolling to the left. Pt B UE AROM WFL and B UE strength 5/5 with the exeception of 4/5 shoulder flex/ext due to pain. Pt completed donning/doffing socks and shoes with SBA. Pt completed functional mobility with CGA with initial c/o of dizziness upon standing but completed with slight fatique and no LOB noted throughout. Pt comes from [facility] as he lives alone and has been caring from himself . He does have a daughter that works close to his home that could assist when needed. OT will continue to treat to address deficits related to strength, act nicko, pain, and safety awareness to ensure safe d/c home. Recommend home with assist at d/c.

## 2025-05-27 NOTE — PAYOR COMM NOTE
"Dorota Araiza W (69 y.o. Male)   [id number]  Admit 05/26 In [facility] [phone]             Date of Birth   1955    Social Security Number       Address   [address] [address]    Home Phone   [phone]    MRN   [id number]       [facility]   [facility]    Marital Status                               Admission Date   5/26/2025    Admission Type   Emergency    Admitting Provider   Raghu Barrett MD    Attending Provider   Raghu Barrett MD    Department, Room/Bed   [facility] 3A, 354/1       Discharge Date       Discharge Disposition       Discharge Destination                                 Attending Provider: Raghu Barrett MD    Allergies: No Known Allergies    Isolation: None   Infection: None   Code Status: CPR    Ht: 195.6 cm (77\")   Wt: 130 kg (286 lb 13.1 oz)    Admission Cmt: None   Principal Problem: Closed fracture of odontoid process of axis [S12.100A]                   Active Insurance as of 5/26/2025       Primary Coverage       Payor Plan Insurance Group Employer/Plan Group    AETNA MEDICARE REPLACEMENT AETNA MEDICARE ADVANTAGE SNP [id number]       Payor Plan Address Payor Plan Phone Number Payor Plan Fax Number Effective Dates    [address] [phone]  3/1/2025 - None Entered    [facility] [id number]         Subscriber Name Subscriber Birth Date Member ID       DOROTA ARAIZA W 1955 [id number]               Secondary Coverage       Payor Plan Insurance Group Employer/Plan Group    [employer] [place] MEDICAID        Payor Plan Address Payor Plan Phone Number Payor Plan Fax Number Effective Dates    [address] [address] [phone]  6/16/2020 - None Entered    [facility] [id number]         Subscriber Name Subscriber Birth Date Member ID       DOROTA ARAIZA W 1955 [id number]                     Emergency Contacts        (Rel.) Home Phone Work Phone Mobile Phone    JAYESH MCCLURE " (Daughter) [phone] -- --                 History & Physical        Raghu Barrett MD at 05/26/25 0937          Date of Admission: 5/26/2025  Primary Care Physician: Maurice Caraballo MD        Subjective:    Chief complaint odontoid fracture    HPI: 69-year-old gentleman who fell early Sunday morning at home striking the front of his head and face.  He immediately had neck pain.  He does feel he may have had a short loss of consciousness.  He denies any upper extremity or lower extremity pain numbness or tingling since the accident.  The following day he had a significant neck pain required assistance from a friend to stabilize his neck while he moved around.  He was able to walk without any difficulty other than the neck pain.  This morning he felt it was not improving so he called 911 as brought to the emergency room.    Review of Systems   Musculoskeletal:  Positive for neck pain.   All other systems reviewed and are negative.       Pertinent positives/negatives documented in HPI.  All other systems reviewed and negative.    Past Medical History:   Past Medical History:   Diagnosis Date    Abnormal ECG     Aneurysm     Arrhythmia     Asthma     Atrial fibrillation     COPD (chronic obstructive pulmonary disease)     Elevated cholesterol     GERD (gastroesophageal reflux disease)     Hyperlipidemia     Hypertension     Type 2 diabetes mellitus 12/09/2021       Past Surgical History:   Past Surgical History:   Procedure Laterality Date    ABDOMINAL AORTIC ANEURYSM REPAIR WITH ENDOGRAFT N/A 11/10/2021    Procedure: ABDOMINAL AORTIC ANEURYSM REPAIR WITH ENDOGRAFT;  Surgeon: Khai Reynolds DO;  Location: [facility] HYBRID OR 12;  Service: Vascular;  Laterality: N/A;    AORTOGRAM N/A 12/15/2021    Procedure: AORTOILIAC ANGIOGRAM, BILATERAL ILIAC BALLOON ANGIOPLASTY, MYNX CLOSURE;  Surgeon: Khai Reynolds DO;  Location:  [facility] HYBRID OR 12;  Service: Vascular;  Laterality: N/A;    APPENDECTOMY      ATRIAL  APPENDAGE EXCLUSION LEFT WITH TRANSESOPHAGEAL ECHOCARDIOGRAM Right 5/9/2023    Procedure: Atrial Appendage Occlusion;  Surgeon: Ihsan Zuniga MD;  Location:  [facility] CATH INVASIVE LOCATION;  Service: Cardiology;  Laterality: Right;    CARDIAC ELECTROPHYSIOLOGY PROCEDURE N/A 12/4/2024    Procedure: Ablation atrial fibrillation - PFA;  Surgeon: Sea Howe MD;  Location:  [facility] CATH INVASIVE LOCATION;  Service: Cardiovascular;  Laterality: N/A;       Family History: family history includes Heart attack in his father.    Social History:  reports that he has been smoking cigarettes. He started smoking about 54 years ago. He has a 54.4 pack-year smoking history. He has never used smokeless tobacco. He reports current alcohol use. He reports current drug use. Drug: Marijuana.    Code Status: Full    Medications:  (Not in a hospital admission)      Allergies:  No Known Allergies    Objective:  Physical Exam  Constitutional:       General: He is awake.   Eyes:      Extraocular Movements: Extraocular movements intact.      Pupils: Pupils are equal, round, and reactive to light.   Cardiovascular:      Rate and Rhythm: Normal rate and regular rhythm.   Pulmonary:      Effort: Pulmonary effort is normal.   Abdominal:      General: There is no distension.      Palpations: Abdomen is soft.   Neurological:      Motor: Motor strength is normal.     Deep Tendon Reflexes: Reflexes are normal and symmetric.   Psychiatric:         Speech: Speech normal.         Neurological Exam  Mental Status  Awake. Oriented to person, place and time. Speech is normal. Language is fluent with no aphasia. Attention and concentration are normal.    Cranial Nerves  CN I: Sense of smell is normal.  CN II: Right normal visual field. Left normal visual field.  CN III, IV, VI: Extraocular movements intact bilaterally. Pupils equal round and reactive to light bilaterally.  CN V: Facial sensation is normal.  CN VII:  Right: There is no facial  weakness.  Left: There is no facial weakness.  CN XI: Shoulder shrug strength is normal.  CN XII: Tongue midline without atrophy or fasciculations.    Motor  Normal muscle bulk throughout. Normal muscle tone. Strength is 5/5 throughout all four extremities.    Sensory  Sensation is intact to light touch, pinprick, vibration and proprioception in all four extremities.    Reflexes  Deep tendon reflexes are 2+ and symmetric in all four extremities.      Vital Signs  Temp:  [98.1 °F (36.7 °C)] 98.1 °F (36.7 °C)  Heart Rate:  [66-74] 71  Resp:  [16] 16  BP: (142-149)/(84-95) 142/95        Results Review:  I reviewed the patient's new clinical results.  I reviewed the patient's new imaging results and agree with the interpretation.  I reviewed the patient's other test results and agree with the interpretation         Lab Results (last 24 hours)       ** No results found for the last 24 hours. **            Imaging Results (Last 24 Hours)       Procedure Component Value Units Date/Time    CT Cervical Spine Without Contrast [[id number]] Collected: 05/26/25 0840     Updated: 05/26/25 0855    Narrative:      Exam: CT CERVICAL SPINE WO CONTRAST-      HISTORY: fall, neck pain     COMPARISON: None      DOSE LENGTH PRODUCT: 1307.81 mGy.cm mGy cm. Automated exposure control  was also utilized to decrease patient radiation dose.     TECHNIQUE: Serial helical tomographic images of the cervical spine were  obtained without the use of intravenous contrast. Additionally, sagittal  and coronal reformatted images were also provided for review.     FINDINGS:     There is a displaced type II/type III odontoid fracture with  approximately 6 mm anterior translation of the distal portion. There are  somewhat symmetric lucencies extending through the posterior arch of C1  (series 4 image 14, 13) which is further limited in evaluation due to  motion.     Straightening of the cervical lordosis. Multilevel moderate to severe  degenerative disc  disease and facet arthropathy. Suspect mild to  moderate C5-C6 and to a lesser extent C6-C7 spinal canal narrowing.  Variable degree of foraminal narrowing most notable with severe left  foraminal narrowing at C3-C4.       Impression:         1. Acute displaced type II/type III odontoid fracture.  2. Symmetric nondisplaced lucencies involving the posterior arch of C1.  Suspicion is that these represent fractures in the setting of trauma,  however are limited in evaluation due to motion.     Findings and recommendations were discussed with DAX SIN JR on  5/26/2025 8:52 AM at 5/26/2025 8:52 AM.           This report was signed and finalized on 5/26/2025 8:52 AM by Gurdeep Pavon.       CT Facial Bones Without Contrast [[id number]] Collected: 05/26/25 0834     Updated: 05/26/25 0843    Narrative:      Exam: CT FACIAL BONES WO CONTRAST-      HISTORY: fall, hit left orbit      COMPARISON: None      DOSE LENGTH PRODUCT: 1307.81 mGy.cm mGy cm. Automated exposure control  was also utilized to decrease patient radiation dose.     TECHNIQUE: Serial helical tomographic images of the facial bones were  obtained without contrast. Multiplanar reformatted images were provided  for review.     FINDINGS:     Osseous structures: Odontoid fracture will be described on separate  report. No additional facial fracture.     Orbits: Unremarkable.     Sinuses: Unremarkable.     Mastoids: Unremarkable.     Soft tissues: Unremarkable.     Other: None.        Impression:         1. Odontoid fracture as described on separate report.  2. No acute facial fracture.     Findings and recommendations were discussed with DAX SIN JR on  5/26/2025 8:40 AM at 5/26/2025 8:40 AM.           This report was signed and finalized on 5/26/2025 8:40 AM by Gurdeep Pavon.       CT Head Without Contrast [[id number]] Collected: 05/26/25 0831     Updated: 05/26/25 0836    Narrative:      Exam: CT HEAD WO CONTRAST-      HISTORY: fall, hit back of  head       DOSE LENGTH PRODUCT: 1307.81 mGy.cm mGy cm. Automated exposure control  was also utilized to decrease patient radiation dose.     Technique:  Helically acquired CT of the brain without IV contrast was performed.  Sagittal and coronal reformations are also provided for review. Soft  tissue and bone kernels are available for interpretation.     Comparison: None.     Findings:     Ventricles and extra-axial CSF spaces are normal in size.     No intraparenchymal or extra-axial hemorrhage.     Gray-white matter differentiation is preserved.     Orbits are grossly unremarkable. Paranasal sinuses are grossly clear.  Mastoid air cells are grossly clear.     No suspicious calvarial or extracranial soft tissue abnormality.       Impression:      Impression:       No acute intracranial abnormality.     This report was signed and finalized on 5/26/2025 8:33 AM by Gurdeep Pavon.                      Assessment/Plan:   Type II odontoid fracture along with a C1 ring fracture there is posterior displacement at the fracture site along with the bilateral facet joints.  The patient would benefit from posterior cervical instrumentation and stabilization.  Risks and benefits were discussed which included but were not limited to infection, bleeding, paralysis, spinal fluid leak, bowel bladder incontinence, stroke, coma, and death.  In addition we also discussed the option of long-term bracing with the understanding that that may result in an unsuccessful healing of this fracture and require surgery down the road.  Patient Jacky understand this.  His questions concerns were addressed.    There are no active hospital problems to display for this patient.      I discussed the patient's findings and my recommendations with patient and consulting provider    Raghu Barrett MD  05/26/25  09:37 CDT    I spent 35 minutes caring for Teo on this date of service. This time includes time spent by me in the following activities:  preparing for the visit, reviewing tests, performing a medically appropriate examination and/or evaluation, counseling and educating the patient/family/caregiver, referring and communicating with other health care professionals, documenting information in the medical record, independently interpreting results and communicating that information with the patient/family/caregiver, care coordination, ordering medications, ordering procedure(s), obtaining a separately obtained history, and reviewing a separately obtained history        Electronically signed by Raghu Barrett MD at 05/26/25 0965          Emergency Department Notes        Raghu Clayton Jr., MD at 05/26/25 0886          Subjective   History of Present Illness  Patient says that he jumped out of bed yesterday morning to go to the bathroom and tripped over a cat that next to his bed and fell.  He hit his head on the closet door.  And hyperextended his neck.  He hit his face on the cabinet.  Since that time his biggest complaint is pain in his neck.  He says whenever he gets up or moves around somebody has to hold his head and body together so that does not move.  He does complain of some tenderness in his scalp and decided to get checked out.    History provided by:  Patient   used: No    Fall  Mechanism of injury: fall    Injury location:  Head/neck and face  Head/neck injury location:  Head, L neck and R neck  Facial injury location:  L cheek  Incident location:  Home  Time since incident:  1 day  Arrived directly from scene: no    Fall:     Fall occurred:  Tripped    Impact surface:  Furniture    Point of impact:  Head    Entrapped after fall: no    Protective equipment: none    Suspicion of alcohol use: no    Suspicion of drug use: no    Tetanus status:  Unknown  Prior to arrival data:     Bystander interventions:  None    Patient ambulatory at scene: yes      Blood loss:  None    Responsiveness at scene:  Alert    Orientation  at scene:  Person, situation, place and time    Loss of consciousness: Uncertain.      Amnesic to event: no      Airway interventions:  None    Breathing interventions:  None    IV access status:  None    IO access:  None    Fluids administered:  None    Cardiac interventions:  None    Medications administered:  None    Immobilization:  None    Airway condition since incident:  Stable    Breathing condition since incident:  Stable    Circulation condition since incident:  Stable    Mental status condition since incident:  Stable    Disability condition since incident:  Stable  Associated symptoms: headaches and neck pain    Associated symptoms: no abdominal pain, no back pain, no blindness, no chest pain, no difficulty breathing, no hearing loss, no loss of consciousness, no nausea, no seizures and no vomiting    Risk factors: no AICD, no anticoagulation therapy, no asthma, no beta blocker therapy, no CABG, no CAD, no CHF, no COPD, no diabetes, no dialysis, no hemophilia, no kidney disease, no pacemaker, no past MI, not pregnant and no steroid use        Review of Systems   Constitutional: Negative.    HENT:  Negative for hearing loss.    Eyes:  Negative for blindness.   Respiratory: Negative.     Cardiovascular: Negative.  Negative for chest pain.   Gastrointestinal: Negative.  Negative for abdominal pain, nausea and vomiting.   Genitourinary: Negative.    Musculoskeletal:  Positive for neck pain. Negative for back pain.   Skin: Negative.    Neurological:  Positive for headaches. Negative for seizures and loss of consciousness.   Psychiatric/Behavioral: Negative.     All other systems reviewed and are negative.      Past Medical History:   Diagnosis Date    Abnormal ECG     Aneurysm     Arrhythmia     Asthma     Atrial fibrillation     COPD (chronic obstructive pulmonary disease)     Elevated cholesterol     GERD (gastroesophageal reflux disease)     Hyperlipidemia     Hypertension     Type 2 diabetes mellitus  12/09/2021       No Known Allergies    Past Surgical History:   Procedure Laterality Date    ABDOMINAL AORTIC ANEURYSM REPAIR WITH ENDOGRAFT N/A 11/10/2021    Procedure: ABDOMINAL AORTIC ANEURYSM REPAIR WITH ENDOGRAFT;  Surgeon: Khai Reynolds DO;  Location:  [facility] HYBRID OR 12;  Service: Vascular;  Laterality: N/A;    AORTOGRAM N/A 12/15/2021    Procedure: AORTOILIAC ANGIOGRAM, BILATERAL ILIAC BALLOON ANGIOPLASTY, MYNX CLOSURE;  Surgeon: Khai Reynolds DO;  Location:  [facility] HYBRID OR 12;  Service: Vascular;  Laterality: N/A;    APPENDECTOMY      ATRIAL APPENDAGE EXCLUSION LEFT WITH TRANSESOPHAGEAL ECHOCARDIOGRAM Right 5/9/2023    Procedure: Atrial Appendage Occlusion;  Surgeon: Ihsan Zuniga MD;  Location:  [facility] CATH INVASIVE LOCATION;  Service: Cardiology;  Laterality: Right;    CARDIAC ELECTROPHYSIOLOGY PROCEDURE N/A 12/4/2024    Procedure: Ablation atrial fibrillation - PFA;  Surgeon: Sea Howe MD;  Location:  [facility] CATH INVASIVE LOCATION;  Service: Cardiovascular;  Laterality: N/A;       Family History   Problem Relation Age of Onset    Heart attack Father     Colon polyps Neg Hx     Esophageal cancer Neg Hx     Colon cancer Neg Hx        Social History     Socioeconomic History    Marital status:    Tobacco Use    Smoking status: Every Day     Current packs/day: 1.00     Average packs/day: 1 pack/day for 54.4 years (54.4 ttl pk-yrs)     Types: Cigarettes     Start date: 1/1/1971    Smokeless tobacco: Never    Tobacco comments:     SMOKES 6-8 CIG/DAY,  ROLLS HIS OWN   Vaping Use    Vaping status: Never Used   Substance and Sexual Activity    Alcohol use: Yes     Comment: rarely    Drug use: Yes     Types: Marijuana    Sexual activity: Defer       Prior to Admission medications    Medication Sig Start Date End Date Taking? Authorizing Provider   albuterol (PROVENTIL) (2.5 MG/3ML) 0.083% nebulizer solution Take 2.5 mg by nebulization Every 4 (Four) Hours As Needed for Wheezing. 9/25/24    Maurice Caraballo MD   allopurinol (ZYLOPRIM) 300 MG tablet Take 1 tablet by mouth Every Night.    Edward Thomas MD   amLODIPine (NORVASC) 5 MG tablet TAKE 1 TABLET BY MOUTH DAILY. 5/5/25   Maurice Caraballo MD   aspirin 81 MG EC tablet Take 1 tablet by mouth Daily. 4/2/25   Valdo Manrique APRN   atorvastatin (LIPITOR) 20 MG tablet Take 1 tablet by mouth Every Night.    Edward Thomas MD   eszopiclone (Lunesta) 3 MG tablet Take 1 tablet by mouth Every Night. Take immediately before bedtime 2/25/25   Maurice Caraballo MD   famotidine (PEPCID) 40 MG tablet Take 1 tablet by mouth Every Night. 12/19/24   Maurice Caraballo MD   lisinopril (PRINIVIL,ZESTRIL) 40 MG tablet Take 1 tablet by mouth Daily. 4/1/25   Maurice Caraballo MD   metoprolol succinate XL (TOPROL-XL) 100 MG 24 hr tablet Take 1 tablet by mouth Daily. 11/2/21   Edward Thomas MD   omeprazole (priLOSEC) 40 MG capsule Take 1 capsule by mouth Every Morning.    Edward Thomas MD       Medications   sodium chloride 0.9 % flush 10 mL (has no administration in time range)       Vitals:    05/26/25 0929   BP:    Pulse: 71   Resp:    Temp:    SpO2: 94%         Objective   Physical Exam  Vitals and nursing note reviewed.   Constitutional:       Appearance: Normal appearance.   HENT:      Head: Normocephalic.      Comments: Patient does have some tenderness and slight scalp swelling in the occipital scalp.  Eyes:      Extraocular Movements: Extraocular movements intact.      Pupils: Pupils are equal, round, and reactive to light.      Comments: Patient does have bruising over the left lower orbit area.   Cardiovascular:      Rate and Rhythm: Normal rate and regular rhythm.   Pulmonary:      Effort: Pulmonary effort is normal.      Breath sounds: Normal breath sounds.   Abdominal:      General: Abdomen is flat.      Palpations: Abdomen is soft.   Musculoskeletal:         General: Normal range of motion.   Skin:     General: Skin is warm and dry.    Neurological:      General: No focal deficit present.      Mental Status: He is alert and oriented to person, place, and time.   Psychiatric:         Mood and Affect: Mood normal.         Behavior: Behavior normal.         Procedures        Lab Results (last 24 hours)       ** No results found for the last 24 hours. **            CT Head Without Contrast   Final Result   Impression:         No acute intracranial abnormality.       This report was signed and finalized on 5/26/2025 8:33 AM by Gurdeep Pavon.          CT Facial Bones Without Contrast   Final Result       1. Odontoid fracture as described on separate report.   2. No acute facial fracture.       Findings and recommendations were discussed with DAX SIN JR on   5/26/2025 8:40 AM at 5/26/2025 8:40 AM.               This report was signed and finalized on 5/26/2025 8:40 AM by Gurdeep Pavon.          CT Cervical Spine Without Contrast   Final Result       1. Acute displaced type II/type III odontoid fracture.   2. Symmetric nondisplaced lucencies involving the posterior arch of C1.   Suspicion is that these represent fractures in the setting of trauma,   however are limited in evaluation due to motion.       Findings and recommendations were discussed with DAX SIN JR on   5/26/2025 8:52 AM at 5/26/2025 8:52 AM.               This report was signed and finalized on 5/26/2025 8:52 AM by Gurdeep Pavon.          XR Chest 1 View    (Results Pending)       ED Course          MDM  Number of Diagnoses or Management Options  Closed odontoid fracture, initial encounter: new and requires workup  Diagnosis management comments: Patient does have an odontoid fracture.  I spoke with Dr. Barrett of neurosurgery and he has examined the patient.  He is taking him to surgery now.       Amount and/or Complexity of Data Reviewed  Clinical lab tests: ordered and reviewed  Tests in the radiology section of CPT®: ordered and reviewed  Discuss the patient  with other providers: yes    Risk of Complications, Morbidity, and/or Mortality  Presenting problems: moderate  Diagnostic procedures: moderate  Management options: moderate    Patient Progress  Patient progress: stable        Final diagnoses:   Closed odontoid fracture, initial encounter          Raghu Clayton Jr., MD  05/26/25 1004       Raghu Clayton Jr., MD  05/26/25 1005      Electronically signed by Raghu Clayton Jr., MD at 05/26/25 1005       Vital Signs (last day)       Date/Time Temp Temp src Pulse Resp BP Patient Position SpO2    05/27/25 1112 98.6 (37) Oral 73 18 132/68 Lying 95    05/27/25 0730 98.1 (36.7) Oral 71 16 144/92 Lying 94    05/27/25 0423 98.4 (36.9) Oral 66 16 135/76 Lying 93    05/26/25 2346 98.2 (36.8) Oral 71 16 134/76 Lying 95    05/26/25 1612 97.6 (36.4) Oral 72 16 137/76 Lying 92    05/26/25 1530 96.5 (35.8) Oral 77 16 120/75 Lying 91    05/26/25 1459 98.1 (36.7) Oral 79 18 113/74 Lying 92    05/26/25 1440 -- -- -- 16 122/77 -- --    05/26/25 1435 -- -- 72 18 121/73 -- 94    05/26/25 1430 98 (36.7) Temporal 82 18 124/70 -- 93    05/26/25 1425 -- -- 81 14 118/76 -- 94    05/26/25 1420 -- -- 81 16 117/77 -- 94    05/26/25 1415 -- -- 88 16 111/79 -- 95    05/26/25 1408 97.3 (36.3) Temporal 85 20 112/71 Lying 95    05/26/25 0929 -- -- 71 -- -- -- 94    05/26/25 0916 -- -- 66 -- 142/95 -- 90    05/26/25 0726 98.1 (36.7) Oral 74 16 149/84 Lying 94          Current Facility-Administered Medications   Medication Dose Route Frequency Provider Last Rate Last Admin    acetaminophen (TYLENOL) tablet 650 mg  650 mg Oral Q8H Raghu Barrett MD   650 mg at 05/27/25 0840    Or    acetaminophen (TYLENOL) 160 MG/5ML oral solution 650 mg  650 mg Oral Q8H Raghu Barrett MD        Or    acetaminophen (TYLENOL) suppository 650 mg  650 mg Rectal Q8H Raghu Barrett MD        albuterol (PROVENTIL) nebulizer solution 0.083% 2.5 mg/3mL  2.5 mg Nebulization Q4H PRN Raghu Barrett MD         allopurinol (ZYLOPRIM) tablet 300 mg  300 mg Oral Nightly Raghu Barrett MD   300 mg at 05/26/25 2014    amLODIPine (NORVASC) tablet 5 mg  5 mg Oral Daily Raghu Barrett MD   5 mg at 05/27/25 0841    aspirin EC tablet 81 mg  81 mg Oral Daily Raghu Barrett MD   81 mg at 05/27/25 0840    atorvastatin (LIPITOR) tablet 20 mg  20 mg Oral Nightly Raghu Barrett MD   20 mg at 05/26/25 2014    sennosides-docusate (PERICOLACE) 8.6-50 MG per tablet 2 tablet  2 tablet Oral BID Raghu Barrett MD   2 tablet at 05/27/25 0841    And    polyethylene glycol (MIRALAX) packet 17 g  17 g Oral Daily PRN Raghu Barrett MD        And    bisacodyl (DULCOLAX) EC tablet 5 mg  5 mg Oral Daily PRN Raghu Barrett MD        And    bisacodyl (DULCOLAX) suppository 10 mg  10 mg Rectal Daily PRN Raghu Barrett MD        cyclobenzaprine (FLEXERIL) tablet 10 mg  10 mg Oral TID PRN Raghu Barrett MD   10 mg at 05/27/25 0840    famotidine (PEPCID) tablet 40 mg  40 mg Oral Nightly Raghu Barrett MD   40 mg at 05/26/25 2014    lisinopril (PRINIVIL,ZESTRIL) tablet 40 mg  40 mg Oral Daily Raghu Barrett MD   40 mg at 05/27/25 0840    metoprolol succinate XL (TOPROL-XL) 24 hr tablet 100 mg  100 mg Oral Daily Raghu Barrett MD   100 mg at 05/27/25 0841    Morphine sulfate (PF) injection 2 mg  2 mg Intravenous Q2H PRN Raghu Barrett MD        And    naloxone (NARCAN) injection 0.4 mg  0.4 mg Intravenous Q5 Min PRN Raghu Barrett MD        ondansetron (ZOFRAN) injection 4 mg  4 mg Intravenous Q6H PRN Raghu Barrett MD        oxyCODONE-acetaminophen (PERCOCET) 7.5-325 MG per tablet 1 tablet  1 tablet Oral Q4H PRN Raghu Barrett MD   1 tablet at 05/27/25 1308    pantoprazole (PROTONIX) EC tablet 40 mg  40 mg Oral Q AM Raghu Barrett MD   40 mg at 05/27/25 0517    [Transfer Hold] sodium chloride 0.9 % flush 10 mL  10 mL Intravenous PRN Raghu Clayton Jr., MD        sodium chloride 0.9 % flush 10 mL  10 mL  Intravenous PRN Raghu Barrett MD        sodium chloride 0.9 % flush 3 mL  3 mL Intravenous Q12H Raghu Barrett MD   3 mL at 05/27/25 0855    sodium chloride 0.9 % infusion 40 mL  40 mL Intravenous PRN Raghu Barrett MD        sodium chloride 0.9 % infusion  75 mL/hr Intravenous Continuous Raghu Barrett MD 75 mL/hr at 05/26/25 1620 75 mL/hr at 05/26/25 1620    sodium chloride nasal spray 1 spray  1 spray Each Nare Q30 Min PRN Harley Curtis APRN   1 spray at 05/27/25 1309    zolpidem (AMBIEN) tablet 5 mg  5 mg Oral Nightly PRN Raghu Barrett MD            Operative/Procedure Notes (all)        Raghu Barrett MD at 05/26/25 1228  Version 1 of 1          Procedure Note  Preop Diagnosis: Closed odontoid fracture, initial encounter [S12.100A]    Post-Op Diagnosis Codes:     * Closed odontoid fracture, initial encounter [S12.100A]     Procedure Name: Posterior cervical fusion with autograft and allograft C1, 2, 3  Posterior instrumented fusion C1-3  Use of image guided stereotactic navigation  Use the surgical robot      Indications:  A CT scan revealed findings of displaced odontoid fracture. The patient now presents for stabilization and fusion after discussing therapeutic alternatives.          Surgeon: Raghu Barrett MD     Assistants: None    Anesthesia: General endotracheal anesthesia    ASA Class: 3    Procedure Details   After obtaining informed consent, having the risks and benefits of the procedure explained including but not limited to infection, bleeding, paralysis, spinal fluid leak, bowel bladder incontinence, stroke, coma, and death.  The patient was brought to the operating.  He was given general anesthesia via an endotracheal tube.  He was placed on 3-point Robles head harness.  Maintaining inline stabilization he was flipped prone onto a Madhu axis table.  His head was then anchored to the frame using the Robles head harness.  The posterior cervical area was then prepped  and draped in the standard sterile fashion.  An O-arm navigation spin was then conducted.  A midline incision was then marked with indelible marker.  Was injected with Marcaine and epinephrine.  A 10 blade scalpel was used to make an incision the dermis epidermis.  Bovie cautery was used to extend the incision down to the nuchal line through the subcutaneous and soft tissues to the level of the spinous processes.  The musculature and connective tissue were then dissected off the spinous processes of C1, 2, 3 on the left and the right using Bovie cautery.  2 cerebellar retractors were then placed into the wound.  Image-guided navigation was then used to identify the lateral masses at C1, 2, 3 on the left and the right.   holes were drilled at each level under image guided navigation.  Hand drill was then used to complete the channel through the lateral masses on the left and the right at each level.  Each level was then tapped under image guided navigation.  Then a series of image-guided screws were placed at C1, 2, 3 on the right and C1 and 3 on the left.  The wound was then copiously irrigated with antibiotic solution.  Is inspected for hemostasis.  The ring of C1 and lamina and facet joints of C2 and C3 were then decorticated using an electric drill.  Autograft and allograft material were then placed over the lamina and facet joints on the left and the right from C1-C3.  The deep tissues were then closed using a series of inverted interrupted 0 Vicryl sutures.  The subcutaneous and soft tissues were closed using a series of inverted erupted 2-0 Vicryl sutures.  The skin was closed using a running 4-0 Monocryl.  All sponge needle and instrument counts were correct at end procedure.  The patient was extubated in stable condition returned recovery with about 400 cc of blood loss.    Findings:  Odontoid fracture]    Estimated Blood Loss:   400           Drains: none           Total IV Fluids: ml            Specimens: None           Implants:   Implant Name Type Inv. Item Serial No.  Lot No. LRB No. Used Action   KT HEMOST ABS SURGIFOAM PORCN 1GRAM - [id number] Implant KT HEMOST ABS SURGIFOAM PORCN 1GRAM  [employer]  DIV [employer] J AND J [id number] N/A 3 Implanted   ALLOGRFT DBM MAGNIFUSE 1X5CM - [id number] - [id number] Implant ALLOGRFT DBM MAGNIFUSE 1X5CM [id number] [employer] NR N/A 1 Implanted   SCRW MAS SPINE INFINITY PT 3.5X32MM - [id number] Implant SCRW MAS SPINE INFINITY PT 3.5X32MM  [employer] NR N/A 2 Implanted   SCRW MAS INFINITY 3.5X32MM - [id number] Implant SCRW MAS INFINITY 3.5X32MM  [employer] NR N/A 1 Implanted   SCRW INFINITY MAS 3.5X20MM - [id number] Implant SCRW INFINITY MAS 3.5X20MM  [employer] NR N/A 1 Implanted and Explanted   SCRW MAS INFINITY 3.5X32MM - [id number] Implant SCRW MAS INFINITY 3.5X32MM  [employer] NR N/A 1 Implanted   SCRW INFINITY MAS 3.5X16MM - [id number] Implant SCRW INFINITY MAS 3.5X16MM  [employer] NR N/A 1 Implanted   SCRW INFINITY MAS 3.5X18MM - [id number] Implant SCRW INFINITY MAS 3.5X18MM  [employer] NR N/A 1 Implanted   SIENNA P/C INFINITY 3.5X40MM - [id number] Implant SIENNA P/C INFINITY 3.5X40MM  [employer] NR N/A 1 Implanted   SIENNA SPINE INFINITY P/C 3.5X50MM - [id number] Implant SIENNA SPINE INFINITY P/C 3.5X50MM  [employer] NR N/A 1 Implanted   SCRW ST OCT INFINITY THRD - [id number] Implant SCRW ST OCT INFINITY THRD  [employer] NR N/A 5 Implanted   PUTTY DBM MAR 2.5CC - [id number] - [id number] Implant PUTTY DBM MAR 2.5CC [id number] [employer] NR N/A 1 Implanted              Complications:  none           Disposition: PACU - hemodynamically stable.           Condition: stable        Raghu Barrett MD     Electronically signed by Raghu Barrett MD at 05/26/25 1414          Physician Progress Notes (all)        Harley Curtis, ALYSSA at 05/27/25 0646          Teo Jones  69 y.o.      Chief complaint:   Neck pain status post  "posterior cervical fusion    Subjective  No events overnight    Temp:  [96.5 °F (35.8 °C)-98.4 °F (36.9 °C)] 98.4 °F (36.9 °C)  Heart Rate:  [66-88] 66  Resp:  [14-20] 16  BP: (111-149)/(70-95) 135/76      Objective:  General Appearance:  Comfortable, well-appearing, in no acute distress and in pain.    Vital signs: (most recent): Blood pressure 144/92, pulse 71, temperature 98.1 °F (36.7 °C), temperature source Oral, resp. rate 16, height 195.6 cm (77\"), weight 130 kg (286 lb 13.1 oz), SpO2 94%.  Vital signs are normal.  No fever.    Output: Producing urine.    HEENT: Normal HEENT exam.    Lungs:  Normal effort and normal respiratory rate.  He is not in respiratory distress.    Heart: Normal rate.  Regular rhythm.    Chest: Symmetric chest wall expansion.   Extremities: Normal range of motion.    Skin:  Warm and dry.    Pulses: Distal pulses are intact.        Neurological Exam  Mental Status  Awake. Oriented to person, place and time. Speech is normal. Language is fluent with no aphasia. Attention and concentration are normal.    Cranial Nerves  CN I: Sense of smell is normal.  CN II: Right normal visual field. Left normal visual field.  CN III, IV, VI: Extraocular movements intact bilaterally. Pupils equal round and reactive to light bilaterally.  CN V: Facial sensation is normal.  CN VII:  Right: There is no facial weakness.  Left: There is no facial weakness.  CN XI: Shoulder shrug strength is normal.  CN XII: Tongue midline without atrophy or fasciculations.    Motor  Normal muscle bulk throughout. Normal muscle tone. Strength is 5/5 throughout all four extremities.    Gait  Normal casual, toe, heel and tandem gait.      Lab Results (last 24 hours)       Procedure Component Value Units Date/Time    Basic Metabolic Panel [[id number]]  (Abnormal) Collected: 05/27/25 0605    Specimen: Blood Updated: 05/27/25 0633     Glucose 122 mg/dL      BUN 9 mg/dL      Creatinine 0.63 mg/dL      Sodium 140 mmol/L      " Potassium 3.7 mmol/L      Chloride 105 mmol/L      CO2 25.0 mmol/L      Calcium 8.0 mg/dL      BUN/Creatinine Ratio 14.3     Anion Gap 10.0 mmol/L      eGFR 104.3 mL/min/1.73     Narrative:      GFR Categories in Chronic Kidney Disease (CKD)              GFR Category          GFR (mL/min/1.73)    Interpretation  G1                    90 or greater        Normal or high (1)  G2                    60-89                Mild decrease (1)  G3a                   45-59                Mild to moderate decrease  G3b                   30-44                Moderate to severe decrease  G4                    15-29                Severe decrease  G5                    14 or less           Kidney failure    (1)In the absence of evidence of kidney disease, neither GFR category G1 or G2 fulfill the criteria for CKD.    eGFR calculation 2021 CKD-EPI creatinine equation, which does not include race as a factor    CBC & Differential [[id number]]  (Abnormal) Collected: 05/27/25 0605    Specimen: Blood Updated: 05/27/25 0626    Narrative:      The following orders were created for panel order CBC & Differential.  Procedure                               Abnormality         Status                     ---------                               -----------         ------                     CBC Auto Differential[[id number]]        Abnormal            Final result                 Please view results for these tests on the individual orders.    CBC Auto Differential [[id number]]  (Abnormal) Collected: 05/27/25 0605    Specimen: Blood Updated: 05/27/25 0626     WBC 9.80 10*3/mm3      RBC 3.68 10*6/mm3      Hemoglobin 12.2 g/dL      Hematocrit 37.5 %      .9 fL      MCH 33.2 pg      MCHC 32.5 g/dL      RDW 13.2 %      RDW-SD 50.1 fl      MPV 9.8 fL      Platelets 168 10*3/mm3      Neutrophil % 69.2 %      Lymphocyte % 14.3 %      Monocyte % 13.3 %      Eosinophil % 2.3 %      Basophil % 0.5 %      Immature Grans % 0.4 %      Neutrophils,  Absolute 6.78 10*3/mm3      Lymphocytes, Absolute 1.40 10*3/mm3      Monocytes, Absolute 1.30 10*3/mm3      Eosinophils, Absolute 0.23 10*3/mm3      Basophils, Absolute 0.05 10*3/mm3      Immature Grans, Absolute 0.04 10*3/mm3      nRBC 0.0 /100 WBC     Narrative:      OR yesterday    POC Glucose Once [[id number]]  (Abnormal) Collected: 05/26/25 1425    Specimen: Blood Updated: 05/26/25 1437     Glucose 170 mg/dL      Comment: : [id number] Ebony Lambert ID: [id number]       Comprehensive Metabolic Panel [[id number]]  (Abnormal) Collected: 05/26/25 1019    Specimen: Blood Updated: 05/26/25 1045     Glucose 108 mg/dL      BUN 8 mg/dL      Creatinine 0.61 mg/dL      Sodium 141 mmol/L      Potassium 3.6 mmol/L      Comment: Slight hemolysis detected by analyzer. Result may be falsely elevated.        Chloride 104 mmol/L      CO2 26.0 mmol/L      Calcium 9.1 mg/dL      Total Protein 6.8 g/dL      Albumin 3.8 g/dL      ALT (SGPT) 16 U/L      AST (SGOT) 19 U/L      Alkaline Phosphatase 82 U/L      Total Bilirubin 0.8 mg/dL      Globulin 3.0 gm/dL      A/G Ratio 1.3 g/dL      BUN/Creatinine Ratio 13.1     Anion Gap 11.0 mmol/L      eGFR 105.3 mL/min/1.73     Narrative:      GFR Categories in Chronic Kidney Disease (CKD)              GFR Category          GFR (mL/min/1.73)    Interpretation  G1                    90 or greater        Normal or high (1)  G2                    60-89                Mild decrease (1)  G3a                   45-59                Mild to moderate decrease  G3b                   30-44                Moderate to severe decrease  G4                    15-29                Severe decrease  G5                    14 or less           Kidney failure    (1)In the absence of evidence of kidney disease, neither GFR category G1 or G2 fulfill the criteria for CKD.    eGFR calculation 2021 CKD-EPI creatinine equation, which does not include race as a factor    CBC & Differential [[id number]]   (Abnormal) Collected: 05/26/25 1019    Specimen: Blood Updated: 05/26/25 1027    Narrative:      The following orders were created for panel order CBC & Differential.  Procedure                               Abnormality         Status                     ---------                               -----------         ------                     CBC Auto Differential[[id number]]        Abnormal            Final result                 Please view results for these tests on the individual orders.    CBC Auto Differential [[id number]]  (Abnormal) Collected: 05/26/25 1019    Specimen: Blood Updated: 05/26/25 1027     WBC 10.40 10*3/mm3      RBC 4.61 10*6/mm3      Hemoglobin 15.5 g/dL      Hematocrit 45.6 %      MCV 98.9 fL      MCH 33.6 pg      MCHC 34.0 g/dL      RDW 13.2 %      RDW-SD 48.5 fl      MPV 9.8 fL      Platelets 216 10*3/mm3      Neutrophil % 60.4 %      Lymphocyte % 26.5 %      Monocyte % 11.6 %      Eosinophil % 0.8 %      Basophil % 0.4 %      Immature Grans % 0.3 %      Neutrophils, Absolute 6.28 10*3/mm3      Lymphocytes, Absolute 2.76 10*3/mm3      Monocytes, Absolute 1.21 10*3/mm3      Eosinophils, Absolute 0.08 10*3/mm3      Basophils, Absolute 0.04 10*3/mm3      Immature Grans, Absolute 0.03 10*3/mm3      nRBC 0.0 /100 WBC                 Plan:   Patient to work with PT/OT today. Will evaluate patient with later today for need for rehab. Dressing changes daily      Closed fracture of odontoid process of axis    Odontoid fracture        ALYSSA Lisa      Electronically signed by Harley Curtis APRN at 05/27/25 0751       Harley Curtis APRN at 05/26/25 0706          Rigid Cervical collar has been ordered to reduce pain by restricting mobility and to facilitate healing following a surgical procedure of the spine.      Electronically signed by Harley Curtis APRN at 05/27/25 1751

## 2025-05-27 NOTE — PLAN OF CARE
Goal Outcome Evaluation:           Progress: improving  Outcome Evaluation: Patient oriented x4 with VSS on room air. C/o pain, see MAR. Rest has been fair. IV fluid and IV ABX infusing. Safety maintained.  Wasserman removed at 0529 and patient is DTV.

## 2025-05-27 NOTE — THERAPY TREATMENT NOTE
Acute Care - Physical Therapy Treatment Note  [facility]     Patient Name: Teo Jones  : 1955  MRN: [id number]  Today's Date: 2025   Onset of Illness/Injury or Date of Surgery: 25  Visit Dx:     ICD-10-CM ICD-9-CM   1. Closed odontoid fracture, initial encounter  S12.100A 805.02   2. Impaired mobility [Z74.09]  Z74.09 799.89     Patient Active Problem List   Diagnosis    Hypertension    Hyperlipidemia    Tobacco use    Abdominal aortic aneurysm (AAA) greater than 5.5 cm in diameter in male    Acute bronchitis    Arthritis    Amaro's esophagus    Chest pain    COPD (chronic obstructive pulmonary disease)    Dizziness    Foreign body    Prediabetes    Gout    Abdominal aortic aneurysm (AAA)    Stenosis of other vascular prosthetic devices, implants and grafts, initial encounter    S/P AAA (abdominal aortic aneurysm) repair    PAF (paroxysmal atrial fibrillation)    Epistaxis    Presence of Watchman left atrial appendage closure device    Gastroesophageal reflux disease    Amaro's esophagus determined by biopsy    Closed fracture of odontoid process of axis    Odontoid fracture     Past Medical History:   Diagnosis Date    Abnormal ECG     Aneurysm     Arrhythmia     Asthma     Atrial fibrillation     COPD (chronic obstructive pulmonary disease)     Elevated cholesterol     GERD (gastroesophageal reflux disease)     Hyperlipidemia     Hypertension     Type 2 diabetes mellitus 2021     Past Surgical History:   Procedure Laterality Date    ABDOMINAL AORTIC ANEURYSM REPAIR WITH ENDOGRAFT N/A 11/10/2021    Procedure: ABDOMINAL AORTIC ANEURYSM REPAIR WITH ENDOGRAFT;  Surgeon: Khai Reynolds DO;  Location: [facility] OR ;  Service: Vascular;  Laterality: N/A;    AORTOGRAM N/A 12/15/2021    Procedure: AORTOILIAC ANGIOGRAM, BILATERAL ILIAC BALLOON ANGIOPLASTY, MYNX CLOSURE;  Surgeon: Khai Reynolds DO;  Location: [facility] OR ;  Service: Vascular;  Laterality: N/A;     APPENDECTOMY      ATRIAL APPENDAGE EXCLUSION LEFT WITH TRANSESOPHAGEAL ECHOCARDIOGRAM Right 5/9/2023    Procedure: Atrial Appendage Occlusion;  Surgeon: Ihsan Zuniga MD;  Location:  [facility] CATH INVASIVE LOCATION;  Service: Cardiology;  Laterality: Right;    CARDIAC ELECTROPHYSIOLOGY PROCEDURE N/A 12/4/2024    Procedure: Ablation atrial fibrillation - PFA;  Surgeon: Sea Howe MD;  Location:  [facility] CATH INVASIVE LOCATION;  Service: Cardiovascular;  Laterality: N/A;    CERVICAL DISCECTOMY POSTERIOR FUSION WITH INSTRUMENTATION N/A 5/26/2025    Procedure: CERVICAL DISCECTOMY POSTERIOR FUSION WITH O-ARM C1-3;  Surgeon: Raghu Barrett MD;  Location:  [facility] OR;  Service: Neurosurgery;  Laterality: N/A;     PT Assessment (Last 12 Hours)       PT Evaluation and Treatment       Row Name 05/27/25 1429 05/27/25 1330       Physical Therapy Time and Intention    Subjective Information complains of;pain  -DOT --    Document Type therapy note (daily note)  -DOT --    Mode of Treatment physical therapy  -DOT --    Session Not Performed -- other (see comments)  -NW    Comment, Session Not Performed -- luis duffy working on lunch  -NW      Row Name 05/27/25 1429          General Information    Existing Precautions/Restrictions cervical collar;brace on at all times;fall;spinal  -DOT       Row Name 05/27/25 1429          Pain    Pretreatment Pain Rating 5/10  -DOT     Posttreatment Pain Rating 5/10  -DOT     Pain Location head  -DOT     Pain Management Interventions exercise or physical activity utilized  -DOT     Response to Pain Interventions activity participation with tolerable pain  -DOT       Row Name 05/27/25 1429          Bed Mobility    Bed Mobility sit-sidelying  -DOT     Sit-Sidelying [place] (Bed Mobility) modified independence  -DOT       Row Name 05/27/25 1429          Sit-Stand Transfer    Sit-Stand [place] (Transfers) verbal cues;standby assist  -DOT       Row Name 05/27/25 1429          Stand-Sit Transfer    Stand-Sit  [place] (Transfers) standby assist  -DOT       Row Name 05/27/25 1429          Gait/Stairs (Locomotion)    [place] Level (Gait) standby assist  -DOT     Distance in Feet (Gait) 250  -DOT       Row Name             Wound 05/26/25 1228 posterior cervical spine Surgical    Wound - Properties Group Placement Date: 05/26/25  -DT Placement Time: 1228 -DT Orientation: posterior  -DT Location: cervical spine  -DT Primary Wound Type: Surgical  -DT    Retired Wound - Properties Group Placement Date: 05/26/25  -DT Placement Time: 1228 -DT Orientation: posterior  -DT Location: cervical spine  -DT    Retired Wound - Properties Group Placement Date: 05/26/25  -DT Placement Time: 1228 -DT Orientation: posterior  -DT Location: cervical spine  -DT    Retired Wound - Properties Group Date first assessed: 05/26/25  -DT Time first assessed: 1228 -DT Location: cervical spine  -DT      Row Name 05/27/25 1429          Positioning and Restraints    Pre-Treatment Position in bed  -DOT     Post Treatment Position bed  -DOT     In Bed fowlers;call light within reach;encouraged to call for assist;side rails up x2  -DOT               User Key  (r) = Recorded By, (t) = Taken By, (c) = Cosigned By      Initials Name Provider Type    Adam Purcell, PTA Physical Therapist Assistant    NW Nilda Bedolal PTA Physical Therapist Assistant    Elvis Christiansen, RN Registered Nurse                    Physical Therapy Education       Title: PT OT SLP Therapies (In Progress)       Topic: Physical Therapy (In Progress)       Point: Mobility training (Done)       Learning Progress Summary            Patient Acceptance, E, VU by MS at 5/27/2025 0919    Comment: role of PT in his care, spinal restrictions, use of cervical collar                      Point: Home exercise program (Not Started)       Learner Progress:  Not documented in this visit.              Point: Body mechanics (Not Started)       Learner Progress:  Not documented in  this visit.              Point: Precautions (Done)       Learning Progress Summary            Patient Acceptance, E, VU by MS at 5/27/2025 0921    Comment: role of PT in his care, spinal restrictions, use of cervical collar                                      User Key       Initials Effective Dates Name Provider Type Discipline    MS 07/11/23 -  Ailyn Dumont, PT, DPT, NCS Physical Therapist PT                  PT Recommendation and Plan         Outcome Measures       Row Name 05/27/25 0757             How much help from another is currently needed...    Putting on and taking off regular lower body clothing? 4  -AC (r) BH (t) AC (c)      Bathing (including washing, rinsing, and drying) 3  -AC (r) BH (t) AC (c)      Toileting (which includes using toilet bed pan or urinal) 3  -AC (r) BH (t) AC (c)      Putting on and taking off regular upper body clothing 3  -AC (r) BH (t) AC (c)      Taking care of personal grooming (such as brushing teeth) 4  -AC (r) BH (t) AC (c)      Eating meals 4  -AC (r) BH (t) AC (c)      AM-PAC 6 Clicks Score (OT) 21  -AC (r) BH (t)         Functional Assessment    Outcome Measure Options AM-PAC 6 Clicks Daily Activity (OT)  -AC (r) BH (t) AC (c)                User Key  (r) = Recorded By, (t) = Taken By, (c) = Cosigned By      Initials Name Provider Type    AC Duc Saba, OTR/L, CNT Occupational Therapist    BH Luther Collins, OT Student OT Student                     Time Calculation:    PT Charges       Row Name 05/27/25 1429 05/27/25 0745          Time Calculation    Start Time 1429  -DOT 0745  5 min chart review  -MS     Stop Time 1447  -DOT 0820  -MS     Time Calculation (min) 18 min  -DOT 35 min  -MS     PT Received On 05/27/25  -DOT 05/27/25  -MS     PT Goal Re-Cert Due Date -- 06/06/25  -MS        Time Calculation- PT    Total Timed Code Minutes- PT 18 minute(s)  -DOT --        Timed Charges    60558 - Gait Training Minutes  18  -DOT --        Untimed Charges    PT Eval/Re-eval  Minutes -- 40  -MS        Total Minutes    Timed Charges Total Minutes 18  -DOT --     Untimed Charges Total Minutes -- 40  -MS      Total Minutes 18  -DOT 40  -MS               User Key  (r) = Recorded By, (t) = Taken By, (c) = Cosigned By      Initials Name Provider Type    Ailyn Richards R, PT, DPT, NCS Physical Therapist    Adam Purcell, PTA Physical Therapist Assistant                  Therapy Charges for Today       Code Description Service Date Service Provider Modifiers Qty    [id number] HC GAIT TRAINING EA 15 MIN 5/27/2025 Adam Zuleta PTA GP 1            PT G-Codes  Outcome Measure Options: AM-PAC 6 Clicks Daily Activity (OT)  AM-PAC 6 Clicks Score (PT): 19  AM-PAC 6 Clicks Score (OT): 21    Adam Zuleta PTA  5/27/2025

## 2025-05-27 NOTE — PROGRESS NOTES
Rigid Cervical collar has been ordered to reduce pain by restricting mobility and to facilitate healing following a surgical procedure of the spine.

## 2025-05-27 NOTE — THERAPY EVALUATION
Acute Care - Occupational Therapy Initial Evaluation  [facility]     Patient Name: Teo Jones  : 1955  MRN: [id number]  Today's Date: 2025  Onset of Illness/Injury or Date of Surgery: 25     Referring Physician: Dr. Barrett    Admit Date: 2025       ICD-10-CM ICD-9-CM   1. Closed odontoid fracture, initial encounter  S12.100A 805.02   2. Impaired mobility [Z74.09]  Z74.09 799.89     Patient Active Problem List   Diagnosis    Hypertension    Hyperlipidemia    Tobacco use    Abdominal aortic aneurysm (AAA) greater than 5.5 cm in diameter in male    Acute bronchitis    Arthritis    Amaro's esophagus    Chest pain    COPD (chronic obstructive pulmonary disease)    Dizziness    Foreign body    Prediabetes    Gout    Abdominal aortic aneurysm (AAA)    Stenosis of other vascular prosthetic devices, implants and grafts, initial encounter    S/P AAA (abdominal aortic aneurysm) repair    PAF (paroxysmal atrial fibrillation)    Epistaxis    Presence of Watchman left atrial appendage closure device    Gastroesophageal reflux disease    Amaro's esophagus determined by biopsy    Closed fracture of odontoid process of axis    Odontoid fracture     Past Medical History:   Diagnosis Date    Abnormal ECG     Aneurysm     Arrhythmia     Asthma     Atrial fibrillation     COPD (chronic obstructive pulmonary disease)     Elevated cholesterol     GERD (gastroesophageal reflux disease)     Hyperlipidemia     Hypertension     Type 2 diabetes mellitus 2021     Past Surgical History:   Procedure Laterality Date    ABDOMINAL AORTIC ANEURYSM REPAIR WITH ENDOGRAFT N/A 11/10/2021    Procedure: ABDOMINAL AORTIC ANEURYSM REPAIR WITH ENDOGRAFT;  Surgeon: Khai Reynolds DO;  Location: [facility] OR ;  Service: Vascular;  Laterality: N/A;    AORTOGRAM N/A 12/15/2021    Procedure: AORTOILIAC ANGIOGRAM, BILATERAL ILIAC BALLOON ANGIOPLASTY, MYNX CLOSURE;  Surgeon: Khai Reynolds DO;  Location: [facility]  HYBRID OR 12;  Service: Vascular;  Laterality: N/A;    APPENDECTOMY      ATRIAL APPENDAGE EXCLUSION LEFT WITH TRANSESOPHAGEAL ECHOCARDIOGRAM Right 5/9/2023    Procedure: Atrial Appendage Occlusion;  Surgeon: Ihsan Zuniga MD;  Location:  [facility] CATH INVASIVE LOCATION;  Service: Cardiology;  Laterality: Right;    CARDIAC ELECTROPHYSIOLOGY PROCEDURE N/A 12/4/2024    Procedure: Ablation atrial fibrillation - PFA;  Surgeon: Sea Howe MD;  Location:  [facility] CATH INVASIVE LOCATION;  Service: Cardiovascular;  Laterality: N/A;    CERVICAL DISCECTOMY POSTERIOR FUSION WITH INSTRUMENTATION N/A 5/26/2025    Procedure: CERVICAL DISCECTOMY POSTERIOR FUSION WITH O-ARM C1-3;  Surgeon: Raghu Barrett MD;  Location:  [facility] OR;  Service: Neurosurgery;  Laterality: N/A;         OT ASSESSMENT FLOWSHEET (Last 12 Hours)       OT Evaluation and Treatment       Row Name 05/27/25 0757                   OT Time and Intention    Subjective Information complains of;pain  -AC (r) BH (t) AC (c)        Document Type evaluation  -AC (r) BH (t) AC (c)        Mode of Treatment occupational therapy  -AC (r) BH (t) AC (c)           General Information    Patient Profile Reviewed yes  -AC (r) BH (t) AC (c)        Onset of Illness/Injury or Date of Surgery 05/26/25  -AC (r) BH (t) AC (c)        Referring Physician Dr. Barrett  -AC (r) BH (t) AC (c)        Prior Level of Function independent:;all household mobility;community mobility;ADL's;cooking;cleaning;driving  -AC (r) BH (t) AC (c)        Equipment Currently Used at Home none  Walk in shower  -AC (r) BH (t) AC (c)        Pertinent History of Current Functional Problem Fall, Closed odontoid fx, s/p posterior cervical fusion with autograft and allograft C1, 2, and 3. Posterior instrumentated fusion C1-3  -AC (r) BH (t) AC (c)        Existing Precautions/Restrictions cervical collar;brace on at all times;spinal;fall  -AC (r) BH (t) AC (c)        Risks Reviewed patient:;LOB;increased  discomfort;change in vital signs;increased drainage;lines disloged  -AC (r) BH (t) AC (c)        Benefits Reviewed patient:;improve function;increase independence;increase strength;increase balance;decrease pain;decrease risk of DVT;improve skin integrity;increase knowledge  -AC (r) BH (t) AC (c)           Living Environment    Current Living Arrangements apartment  -AC (r) BH (t) AC (c)        People in Home alone  Daughter works down the road and is available to check on him.  -AC (r) BH (t) AC (c)           Home Main Entrance    Number of Stairs, Main Entrance none  -AC (r) BH (t) AC (c)           Stairs Within Home, Primary    Number of Stairs, Within Home, Primary none  -AC (r) BH (t) AC (c)           Pain Assessment    Pretreatment Pain Rating 5/10  -AC (r) BH (t) AC (c)        Posttreatment Pain Rating 5/10  -AC (r) BH (t) AC (c)        Pain Location neck;head  -AC (r) BH (t) AC (c)        Pain Side/Orientation posterior  -AC (r) BH (t) AC (c)        Pain Management Interventions exercise or physical activity utilized  -AC (r) BH (t) AC (c)        Response to Pain Interventions activity participation with tolerable pain;no change per patient report  -AC (r) BH (t) AC (c)           Cognition    Orientation Status (Cognition) oriented x 4  -AC (r) BH (t) AC (c)        Follows Commands (Cognition) WFL  -AC (r) BH (t) AC (c)        Personal Safety Interventions fall prevention program maintained;gait belt;muscle strengthening facilitated;nonskid shoes/slippers when out of bed;supervised activity  -AC (r) BH (t) AC (c)           Range of Motion Comprehensive    General Range of Motion bilateral upper extremity ROM WFL  -AC (r) BH (t) AC (c)           Strength Comprehensive (MMT)    Comment, General Manual Muscle Testing (MMT) Assessment All B UE 5/5 with excepetion of B shoulder flex/ext 4/5  -AC (r) BH (t) AC (c)           Sensory    Additional Documentation Sensory Assessment (Somatosensory) (Group)  -AC (r) BH  (t) AC (c)           Sensory Assessment (Somatosensory)    Sensory Assessment (Somatosensory) sensation intact  -AC (r) BH (t) AC (c)           Activities of Daily Living    BADL Assessment/Intervention lower body dressing;upper body dressing  -AC (r) BH (t) AC (c)           Upper Body Dressing Assessment/Training    Charles Level (Upper Body Dressing) doff;don;other (see comments);moderate assist (50% patient effort)  C collar  -AC (r) BH (t) AC (c)        Position (Upper Body Dressing) edge of bed sitting  -AC (r) BH (t) AC (c)           Lower Body Dressing Assessment/Training    Charles Level (Lower Body Dressing) don;doff;socks;standby assist  -AC (r) BH (t) AC (c)        Position (Lower Body Dressing) edge of bed sitting  -AC (r) BH (t) AC (c)           BADL Safety/Performance    Impairments, BADL Safety/Performance pain;endurance/activity tolerance;strength  -AC (r) BH (t) AC (c)           Bed Mobility    Bed Mobility supine-sit;rolling left  -AC (r) BH (t) AC (c)        Rolling Left Charles (Bed Mobility) minimum assist (75% patient effort)  -AC (r) BH (t) AC (c)        Supine-Sit Charles (Bed Mobility) minimum assist (75% patient effort)  -AC (r) BH (t) AC (c)        Assistive Device (Bed Mobility) bed rails  -AC (r) BH (t) AC (c)           Functional Mobility    Functional Mobility- Ind. Level contact guard assist;1 person + 1 person to manage equipment  -AC (r) BH (t) AC (c)        Functional Mobility- Comment From bed, to hallway, back to room, to chair  -AC (r) BH (t) AC (c)           Transfer Assessment/Treatment    Transfers sit-stand transfer;stand-sit transfer  -AC (r) BH (t) AC (c)           Sit-Stand Transfer    Sit-Stand Charles (Transfers) contact guard;verbal cues  -AC (r) BH (t) AC (c)           Stand-Sit Transfer    Stand-Sit Charles (Transfers) contact guard;verbal cues  -AC (r) BH (t) AC (c)           Safety Issues/Impairments Affecting Functional Mobility     Impairments Affecting Function (Mobility) pain;strength;endurance/activity tolerance  -AC (r) BH (t) AC (c)           Balance    Balance Assessment sitting static balance;sitting dynamic balance;standing static balance;standing dynamic balance  -AC (r) BH (t) AC (c)        Static Sitting Balance standby assist  -AC (r) BH (t) AC (c)        Dynamic Sitting Balance standby assist  -AC (r) BH (t) AC (c)        Position, Sitting Balance sitting edge of bed  -AC (r) BH (t) AC (c)        Static Standing Balance contact guard  -AC (r) BH (t) AC (c)        Dynamic Standing Balance contact guard  -AC (r) BH (t) AC (c)           Wound 05/26/25 1228 posterior cervical spine Surgical    Wound - Properties Group Placement Date: 05/26/25  -DT Placement Time: 1228 -DT Orientation: posterior  -DT Location: cervical spine  -DT Primary Wound Type: Surgical  -DT    Retired Wound - Properties Group Placement Date: 05/26/25  -DT Placement Time: 1228 -DT Orientation: posterior  -DT Location: cervical spine  -DT    Retired Wound - Properties Group Placement Date: 05/26/25  -DT Placement Time: 1228  -DT Orientation: posterior  -DT Location: cervical spine  -DT    Retired Wound - Properties Group Date first assessed: 05/26/25  -DT Time first assessed: 1228 -DT Location: cervical spine  -DT       Plan of Care Review    Plan of Care Reviewed With patient  -AC (r) BH (t) AC (c)        Progress no change  -AC (r) BH (t) AC (c)        Outcome Evaluation OT eval completed.  Pt alert and oriented x4. Pt seen in fowlers. Pt c/o of pain in posterior neck/head but pleasant and agreeable to participating in eval. Pt educated on 3/3 spinal precautions with good understanding. Pt completed bed mobility with initial verbal cues for proper log rolling technique to follow spinal precautions requiring Tess with rolling to the left. Pt B UE AROM WFL and B UE strength 5/5 with the exeception of 4/5 shoulder flex/ext due to pain. Pt completed  donning/doffing socks and shoes with SBA. Pt completed functional mobility with CGA with initial c/o of dizziness upon standing but completed with slight fatique and no LOB noted throughout. Pt comes from [facility] as he lives alone and has been caring from himself . He does have a daughter that works close to his home that could assist when needed. OT will continue to treat to address deficits related to strength, act nicko, pain, and safety awareness to ensure safe d/c home. Recommend home with assist at d/c.  -AC (r) BH (t) AC (c)           Positioning and Restraints    Pre-Treatment Position in bed  -AC (r) BH (t) AC (c)        Post Treatment Position chair  -AC (r) BH (t) AC (c)        In Chair call light within reach;encouraged to call for assist;sitting;with brace  -AC (r) BH (t) AC (c)           Therapy Assessment/Plan (OT)    OT Diagnosis decreased adl  -AC (r) BH (t) AC (c)        Rehab Potential (OT) good  -AC (r) BH (t) AC (c)        Criteria for Skilled Therapeutic Interventions Met (OT) yes;meets criteria;skilled treatment is necessary  -AC (r) BH (t) AC (c)        Therapy Frequency (OT) 3 times/wk  -AC (r) BH (t) AC (c)        Predicted Duration of Therapy Intervention (OT) 10 days  -AC (r) BH (t) AC (c)        Activity Limitations Related to Problem List (OT) BADLs not performed adequately or safely  -AC (r) BH (t) AC (c)        Planned Therapy Interventions (OT) activity tolerance training;adaptive equipment training;BADL retraining;functional balance retraining;occupation/activity based interventions;patient/caregiver education/training;strengthening exercise;transfer/mobility retraining  -AC (r) BH (t) AC (c)           OT Goals    Bed Mobility Goal Selection (OT) -- (P)   -        Safety Awareness Goal Selection (OT) safety awareness, OT goal 1  -AC (r) BH (t) AC (c)        Problem Specific Goal Selection (OT) problem specific goal 1, OT  -AC (r) BH (t) AC (c)           Bed Mobility Goal 1 (OT)     Activity/Assistive Device (Bed Mobility Goal 1, OT) -- (P)   -        Jenkins Level/Cues Needed (Bed Mobility Goal 1, OT) -- (P)   -        Time Frame (Bed Mobility Goal 1, OT) -- (P)   -        Progress/Outcomes (Bed Mobility Goal 1, OT) -- (P)   -           Safety Awareness Goal 1 (OT)    Activity (Safety Awareness Goal 1, OT) follow through of safety precautions  Spinal precautions; Able to recall 3/3 when prompted  -AC (r) BH (t) AC (c)        Jenkins/Cues/Accuracy (Safety Awareness Goal 1, OT) independent  -AC (r) BH (t) AC (c)        Time Frame (Safety Awareness Goal 1, OT) 10 days  -AC (r) BH (t) AC (c)        Progress/Outcome (Safety Awareness Goal 1, OT) new goal  -AC (r) BH (t) AC (c)           Problem Specific Goal 1 (OT)    Problem Specific Goal 1 (OT) Pt will independently implement one pain management technique to decrease pain and improve functional performance.  -AC (r) BH (t) AC (c)        Time Frame (Problem Specific Goal 1, OT) long term goal (LTG);10 days  -AC (r) BH (t) AC (c)        Progress/Outcome (Problem Specific Goal 1, OT) new goal  -AC (r) BH (t) AC (c)                  User Key  (r) = Recorded By, (t) = Taken By, (c) = Cosigned By      Initials Name Effective Dates    Duc Esteves, OTR/L, CNT 02/03/23 -     Elvis Christiansen RN 02/17/22 -     Luther Briceño OT Student 05/12/25 -                      Occupational Therapy Education       Title: PT OT SLP Therapies (In Progress)       Topic: Occupational Therapy (In Progress)       Point: ADL training (Done)       Learning Progress Summary            Patient Acceptance, E,D,TB, VU,DU by  at 5/27/2025 0846    Comment: OT POC, OT role in care, bed mobility training, t/f training, spinal precautions.                      Point: Precautions (Done)       Learning Progress Summary            Patient Acceptance, E,D,TB, VU,DU by  at 5/27/2025 0846    Comment: OT POC, OT role in care, bed mobility training,  t/f training, spinal precautions.                      Point: Body mechanics (Done)       Learning Progress Summary            Patient Acceptance, E,D,TB, VU,DU by  at 5/27/2025 4420    Comment: OT POC, OT role in care, bed mobility training, t/f training, spinal precautions.                                      User Key       Initials Effective Dates Name Provider Type Discipline     05/12/25 -  Luther Collins, OT Student OT Student OT                      OT Recommendation and Plan  Planned Therapy Interventions (OT): activity tolerance training, adaptive equipment training, BADL retraining, functional balance retraining, occupation/activity based interventions, patient/caregiver education/training, strengthening exercise, transfer/mobility retraining  Therapy Frequency (OT): 3 times/wk  Plan of Care Review  Plan of Care Reviewed With: patient  Progress: no change  Outcome Evaluation: OT eval completed.  Pt alert and oriented x4. Pt seen in fowlers. Pt c/o of pain in posterior neck/head but pleasant and agreeable to participating in eval. Pt educated on 3/3 spinal precautions with good understanding. Pt completed bed mobility with initial verbal cues for proper log rolling technique to follow spinal precautions requiring Tess with rolling to the left. Pt B UE AROM WFL and B UE strength 5/5 with the exeception of 4/5 shoulder flex/ext due to pain. Pt completed donning/doffing socks and shoes with SBA. Pt completed functional mobility with CGA with initial c/o of dizziness upon standing but completed with slight fatique and no LOB noted throughout. Pt comes from [facility] as he lives alone and has been caring from himself . He does have a daughter that works close to his home that could assist when needed. OT will continue to treat to address deficits related to strength, act nicko, pain, and safety awareness to ensure safe d/c home. Recommend home with assist at d/c.  Plan of Care Reviewed With: patient  Outcome  Evaluation: OT eval completed.  Pt alert and oriented x4. Pt seen in fowlers. Pt c/o of pain in posterior neck/head but pleasant and agreeable to participating in eval. Pt educated on 3/3 spinal precautions with good understanding. Pt completed bed mobility with initial verbal cues for proper log rolling technique to follow spinal precautions requiring Tess with rolling to the left. Pt B UE AROM WFL and B UE strength 5/5 with the exeception of 4/5 shoulder flex/ext due to pain. Pt completed donning/doffing socks and shoes with SBA. Pt completed functional mobility with CGA with initial c/o of dizziness upon standing but completed with slight fatique and no LOB noted throughout. Pt comes from [facility] [place] as he lives alone and has been caring from himself . He does have a daughter that works close to his home that could assist when needed. OT will continue to treat to address deficits related to strength, act nicko, pain, and safety awareness to ensure safe d/c home. Recommend home with assist at d/c.     Outcome Measures       Row Name 05/27/25 0757             How much help from another is currently needed...    Putting on and taking off regular lower body clothing? 4  -AC (r) BH (t) AC (c)      Bathing (including washing, rinsing, and drying) 3  -AC (r) BH (t) AC (c)      Toileting (which includes using toilet bed pan or urinal) 3  -AC (r) BH (t) AC (c)      Putting on and taking off regular upper body clothing 3  -AC (r) BH (t) AC (c)      Taking care of personal grooming (such as brushing teeth) 4  -AC (r) BH (t) AC (c)      Eating meals 4  -AC (r) BH (t) AC (c)      AM-PAC 6 Clicks Score (OT) 21  -AC (r) BH (t)         Functional Assessment    Outcome Measure Options AM-PAC 6 Clicks Daily Activity (OT)  -AC (r) BH (t) AC (c)                User Key  (r) = Recorded By, (t) = Taken By, (c) = Cosigned By      Initials Name Provider Type    AC Duc Saba, OTR/L, CNT Occupational Therapist    Luther Briceño, OT  Student OT Student                    Time Calculation:    Time Calculation- OT       Row Name 05/27/25 0821             Time Calculation- OT    OT Start Time 0757  +15 min for chart review  -AC (r) BH (t) AC (c)      OT Stop Time 0821  -AC (r) BH (t) AC (c)      OT Time Calculation (min) 24 min  -AC (r) BH (t)      OT Received On 05/27/25  -AC (r) BH (t) AC (c)      OT Goal Re-Cert Due Date 06/06/25  -AC (r) BH (t) AC (c)         Untimed Charges    OT Eval/Re-eval Minutes 39  -AC (r) BH (t) AC (c)         Total Minutes    Untimed Charges Total Minutes 39  -AC (r) BH (t)       Total Minutes 39  -AC (r) BH (t)                User Key  (r) = Recorded By, (t) = Taken By, (c) = Cosigned By      Initials Name Provider Type    AC Duc Saba, OTR/L, CNT Occupational Therapist    Luther Briceño, [employer] Student OT Student                           Luther Collins OT Student  5/27/2025

## 2025-05-27 NOTE — THERAPY EVALUATION
Patient Name: Teo Jones  : 1955    MRN: [id number]                              Today's Date: 2025       Admit Date: 2025    Visit Dx:     ICD-10-CM ICD-9-CM   1. Closed odontoid fracture, initial encounter  S12.100A 805.02   2. Impaired mobility [Z74.09]  Z74.09 799.89     Patient Active Problem List   Diagnosis    Hypertension    Hyperlipidemia    Tobacco use    Abdominal aortic aneurysm (AAA) greater than 5.5 cm in diameter in male    Acute bronchitis    Arthritis    Amaro's esophagus    Chest pain    COPD (chronic obstructive pulmonary disease)    Dizziness    Foreign body    Prediabetes    Gout    Abdominal aortic aneurysm (AAA)    Stenosis of other vascular prosthetic devices, implants and grafts, initial encounter    S/P AAA (abdominal aortic aneurysm) repair    PAF (paroxysmal atrial fibrillation)    Epistaxis    Presence of Watchman left atrial appendage closure device    Gastroesophageal reflux disease    Amaro's esophagus determined by biopsy    Closed fracture of odontoid process of axis    Odontoid fracture     Past Medical History:   Diagnosis Date    Abnormal ECG     Aneurysm     Arrhythmia     Asthma     Atrial fibrillation     COPD (chronic obstructive pulmonary disease)     Elevated cholesterol     GERD (gastroesophageal reflux disease)     Hyperlipidemia     Hypertension     Type 2 diabetes mellitus 2021     Past Surgical History:   Procedure Laterality Date    ABDOMINAL AORTIC ANEURYSM REPAIR WITH ENDOGRAFT N/A 11/10/2021    Procedure: ABDOMINAL AORTIC ANEURYSM REPAIR WITH ENDOGRAFT;  Surgeon: Khai Reynolds DO;  Location:  [facility];  Service: Vascular;  Laterality: N/A;    AORTOGRAM N/A 12/15/2021    Procedure: AORTOILIAC ANGIOGRAM, BILATERAL ILIAC BALLOON ANGIOPLASTY, MYNX CLOSURE;  Surgeon: Khai Reynolds DO;  Location:  [facility];  Service: Vascular;  Laterality: N/A;    APPENDECTOMY      ATRIAL APPENDAGE EXCLUSION LEFT WITH  TRANSESOPHAGEAL ECHOCARDIOGRAM Right 5/9/2023    Procedure: Atrial Appendage Occlusion;  Surgeon: Ihsan Zuniga MD;  Location:  [facility];  Service: Cardiology;  Laterality: Right;    CARDIAC ELECTROPHYSIOLOGY PROCEDURE N/A 12/4/2024    Procedure: Ablation atrial fibrillation - PFA;  Surgeon: Sea Howe MD;  Location:  [facility];  Service: Cardiovascular;  Laterality: N/A;      General Information       Row Name 05/27/25 0745          Physical Therapy Time and Intention    Document Type evaluation  s/p PCF C1-3 due to displaced odontoid fx, s/p fall at home  -MS     Mode of Treatment physical therapy;co-treatment  -MS       Row Name 05/27/25 0502          General Information    Patient Profile Reviewed yes  -MS     Prior Level of Function independent:;all household mobility;community mobility;ADL's;driving;shopping  -MS     Existing Precautions/Restrictions cervical collar;brace on at all times;spinal  -MS       Row Name 05/27/25 5595          Living Environment    Current Living Arrangements apartment  -MS     People in Home alone  daughter works down the road and check on him  -MS       Row Name 05/27/25 [facility]          Home Main Entrance    Number of Stairs, Main Entrance none  walk in shower without shower  -MS       Row Name 05/27/25 4700          Stairs Within Home, Primary    Number of Stairs, Within Home, Primary none  -MS       Row Name 05/27/25 0164          Cognition    Orientation Status (Cognition) oriented x 4  -MS       Row Name 05/27/25 43          Safety Issues/Impairments Affecting Functional Mobility    Impairments Affecting Function (Mobility) balance  -MS               User Key  (r) = Recorded By, (t) = Taken By, (c) = Cosigned By      Initials Name Provider Type    MS Ailyn Dumont, PT, DPT, NCS Physical Therapist                   Mobility       Row Name 05/27/25 8608          Bed Mobility    Bed Mobility rolling left;sidelying-sit  -MS      Rolling Left Hen[place] (Bed Mobility) modified independence  -MS     Sidelying-Sit [place] (Bed Mobility) modified independence  -MS     Assistive Device (Bed Mobility) bed rails  -MS       Row Name 05/27/25 0745          Sit-Stand Transfer    Sit-Stand Hen[place] (Transfers) contact guard  -MS       Row Name 05/27/25 0745          Gait/Stairs (Locomotion)    Henley Level (Gait) contact guard  -MS     Patient was able to Ambulate yes  -MS     Distance in Feet (Gait) 150  -MS               User Key  (r) = Recorded By, (t) = Taken By, (c) = Cosigned By      Initials Name Provider Type    Ailyn Richards, PT, DPT, NCS Physical Therapist                   Obj/Interventions       Row Name 05/27/25 0745          Range of Motion Comprehensive    General Range of Motion bilateral upper extremity ROM WFL;bilateral lower extremity ROM WFL  -MS       Row Name 05/27/25 45          Strength Comprehensive (MMT)    General Manual Muscle Testing (MMT) Assessment no strength deficits identified  -MS       Row Name 05/27/25 0745          Motor Skills    Motor Skills coordination  -MS     Coordination WFL  -MS       Row Name 05/27/25 [facility]          Balance    Balance Assessment sitting static balance;sitting dynamic balance;standing static balance;standing dynamic balance  -MS     Static Sitting Balance independent  -MS     Dynamic Sitting Balance independent  -MS     Position, Sitting Balance unsupported;sitting edge of bed  -MS     Static Standing Balance contact guard  -MS     Dynamic Standing Balance contact guard  -MS       Row Name 05/27/25 0745          Sensory Assessment (Somatosensory)    Sensory Assessment (Somatosensory) sensation intact  -MS               User Key  (r) = Recorded By, (t) = Taken By, (c) = Cosigned By      Initials Name Provider Type    Ailyn Richards, PT, DPT, NCS Physical Therapist                   Goals/Plan       Row Name 05/27/25 0745          Bed Mobility Goal 1 (PT)     Activity/Assistive Device (Bed Mobility Goal 1, PT) bed mobility activities, all  -MS     Edinburgh Level/Cues Needed (Bed Mobility Goal 1, PT) independent  -MS     Time Frame (Bed Mobility Goal 1, PT) long term goal (LTG);by discharge  -MS     Progress/Outcomes (Bed Mobility Goal 1, PT) new goal  -MS       Row Name 05/27/25 0745          Transfer Goal 1 (PT)    Activity/Assistive Device (Transfer Goal 1, PT) sit-to-stand/stand-to-sit;bed-to-chair/chair-to-bed  -MS     Edinburgh Level/Cues Needed (Transfer Goal 1, PT) independent  -MS     Time Frame (Transfer Goal 1, PT) long term goal (LTG);by discharge  -MS     Progress/Outcome (Transfer Goal 1, PT) new [facility]  -MS       Row Name 05/27/25 0645          Gait Training Goal 1 (PT)    Activity/Assistive Device (Gait Training Goal 1, PT) gait (walking locomotion);decrease fall risk;improve balance and speed;increase endurance/gait distance  -MS     Edinburgh Level (Gait Training Goal 1, PT) independent  -MS     Distance (Gait Training Goal 1, PT) 400ft  -MS     Time Frame (Gait Training Goal 1, PT) long term goal (LTG);by discharge  -MS     Progress/Outcome (Gait Training Goal 1, PT) new [facility]  -MS       Row Name 05/27/25 2546          Balance Goal 1 (PT)    Activity/Assistive Device (Balance Goal) standing dynamic balance;unsupported;with functional mobility activities  -MS     Edinburgh Level/Cues Needed (Balance Goal 1, PT) modified independence  -MS     Time Frame (Balance Goal 1, PT) long-term goal (LTG);by discharge  -MS     Progress/Outcomes (Balance Goal 1, PT) goal ongoing  -MS       Row Name 05/27/25 5123          Therapy Assessment/Plan (PT)    Planned Therapy Interventions (PT) balance training;bed mobility training;gait training;patient/family education;orthotic fitting/training;transfer training  -MS               User Key  (r) = Recorded By, (t) = Taken By, (c) = Cosigned By      Initials Name Provider Type    Ailyn Richards, PT, DPT, NCS  Physical Therapist                   Clinical Impression       Row Name 05/27/25 0745          Pain    Pretreatment Pain Rating 5/10  -MS     Posttreatment Pain Rating 5/10  -MS     Pain Location neck;head  -MS     Pain Side/Orientation posterior  -MS     Pain Management Interventions activity modification encouraged;exercise or physical activity utilized  -MS     Response to Pain Interventions no change per patient report  -MS       Row Name 05/27/25 0745          Plan of Care Review    Plan of Care Reviewed With patient  -MS     Progress improving  -MS     Outcome Evaluation The patient presents alert and oriented x4 lying in bed with aspen collar in place. The patient lives alone and typically cares for himself. He now has spinal restrictions with a cervical collar. He demonstrates no focal neurological deficits in strength, sensation, or coordination. He is slightly unsteady during gait, but he was able to ambulate in the hallway. He benefit from continued PT to work on his balance and increased activity since he lives alone. Recommend discharge home.  -MS       Row Name 05/27/25 0754          Therapy Assessment/Plan (PT)    Patient/Family Therapy Goals Statement (PT) go home  -MS     Rehab Potential (PT) good  -MS     Criteria for Skilled Interventions Met (PT) yes;meets criteria;skilled treatment is necessary  -MS     Therapy Frequency (PT) 2 times/day  -MS     Predicted Duration of Therapy Intervention (PT) until discharge  -MS       Row Name 05/27/25 0745          Positioning and Restraints    Post Treatment Position chair  -MS     In Chair sitting;call light within reach;encouraged to call for assist;with brace  -MS               User Key  (r) = Recorded By, (t) = Taken By, (c) = Cosigned By      Initials Name Provider Type    MS Ailyn Dumont R, PT, DPT, NCS Physical Therapist                   Outcome Measures       Row Name 05/27/25 0840 05/27/25 0728       How much help from another person do you  currently need...    Turning from your back to your side while in flat bed without using bedrails? 4  -AS 3  -MS    Moving from lying on back to sitting on the side of a flat bed without bedrails? 4  -AS 3  -MS    Moving to and from a bed to a chair (including a wheelchair)? 3  -AS 3  -MS    Standing up from a chair using your arms (e.g., wheelchair, bedside chair)? 3  -AS 3  -MS    Climbing 3-5 steps with a railing? 2  -AS 3  -MS    To walk in hospital room? 3  -AS 3  -MS    AM-PAC 6 Clicks Score (PT) 19  -AS 18  -MS    Highest Level of Mobility Goal Walk 10 Steps or More-6  -AS Walk 10 Steps or More-6  -MS      Row Name 05/27/25 0757 05/27/25 0745       Functional Assessment    Outcome Measure [facility] AM-PAC 6 Clicks Daily Activity (OT) (P)   - AM-PAC 6 Clicks Basic Mobility (PT)  -MS              User Key  (r) = Recorded By, (t) = Taken By, (c) = Cosigned By      Initials Name Provider Type    Ailyn Richards R, PT, DPT, NCS Physical Therapist    AS Cherelle Nicholas, RN Registered Nurse     Luther Collins, OT Student OT Student                                 Physical Therapy Education       Title: PT OT SLP Therapies (In Progress)       Topic: Physical Therapy (In Progress)       Point: Mobility training (Done)       Learning Progress Summary            Patient Acceptance, E, VU by MS at 5/27/2025 0923    Comment: role of PT in his care, spinal restrictions, use of cervical collar                      Point: Home exercise program (Not Started)       Learner Progress:  Not documented in this visit.              Point: Body mechanics (Not Started)       Learner Progress:  Not documented in this visit.              Point: Precautions (Done)       Learning Progress Summary            Patient Acceptance, E, VU by MS at 5/27/2025 0923    Comment: role of PT in his care, spinal restrictions, use of cervical collar                                      User Key       Initials Effective Dates Name Provider Type  Discipline    MS 07/11/23 -  Ailyn Dumont PT, DPT, AVELINA Physical Therapist PT                  PT Recommendation and Plan  Planned Therapy Interventions (PT): balance training, bed mobility training, gait training, patient/family education, orthotic fitting/training, transfer training  Progress: improving  Outcome Evaluation: The patient presents alert and oriented x4 lying in bed with aspen collar in place. The patient lives alone and typically cares for himself. He now has spinal restrictions with a cervical collar. He demonstrates no focal neurological deficits in strength, sensation, or coordination. He is slightly unsteady during gait, but he was able to ambulate in the hallway. He benefit from continued PT to work on his balance and increased activity since he lives alone. Recommend discharge home.     Time Calculation:         PT Charges       Row Name 05/27/25 0745             Time Calculation    Start Time 0745  5 min chart review  -MS      Stop Time 0820  -MS      Time Calculation (min) 35 min  -MS      PT Received On 05/27/25  -MS      PT Goal Re-Cert Due Date 06/06/25  -MS         Untimed Charges    PT Eval/Re-eval Minutes 40  -MS         Total Minutes    Untimed Charges Total Minutes 40  -MS       Total Minutes 40  -MS                User Key  (r) = Recorded By, (t) = Taken By, (c) = Cosigned By      Initials Name Provider Type    MS Ailyn Dumont PT, DPT, AVELINA Physical Therapist                      PT G-Codes  Outcome Measure Options: (P) AM-PAC 6 Clicks Daily Activity (OT)  AM-PAC 6 Clicks Score (PT): 19  AM-PAC 6 Clicks Score (OT): (P) 21  PT Discharge Summary  Anticipated Discharge Disposition (PT): home    Ailyn Dumont PT, DPT, AVELINA  5/27/2025

## 2025-05-27 NOTE — ANESTHESIA POSTPROCEDURE EVALUATION
"Patient: Teo Jones    Procedure Summary       Date: 05/26/25 Room / Location:  [facility]  /  [facility]    Anesthesia Start: 1122 Anesthesia Stop: 1411    Procedure: CERVICAL DISCECTOMY POSTERIOR FUSION WITH O-ARM C1-3 (Spine Cervical) Diagnosis:       Closed odontoid fracture, initial encounter      (Closed odontoid fracture, initial encounter [S12.100A])    Surgeons: Raghu Barrett MD Provider: Jt Cruz CRNA    Anesthesia Type: general ASA Status: 3 - Emergent            Anesthesia Type: general    Vitals  Vitals Value Taken Time   /77 05/26/25 14:40   Temp 98 °F (36.7 °C) 05/26/25 14:30   Pulse 79 05/26/25 14:42   Resp 16 05/26/25 14:40   SpO2 92 % 05/26/25 14:42   Vitals shown include unfiled device data.        Post Anesthesia Care and Evaluation    Patient location during evaluation: PACU  Patient participation: complete - patient participated  Level of consciousness: awake and alert  Pain management: adequate    Airway patency: patent  Anesthetic complications: No anesthetic complications    Cardiovascular status: acceptable  Respiratory status: acceptable  Hydration status: acceptable    Comments: Blood pressure 144/92, pulse 71, temperature 98.1 °F (36.7 °C), temperature source Oral, resp. rate 16, height 195.6 cm (77\"), weight 130 kg (286 lb 13.1 oz), SpO2 94%.    Pt discharged from PACU based on marlon score >8    "

## 2025-05-27 NOTE — PROGRESS NOTES
"Teo Jones  69 y.o.      Chief complaint:   Neck pain status post posterior cervical fusion    Subjective  No events overnight    Temp:  [96.5 °F (35.8 °C)-98.4 °F (36.9 °C)] 98.4 °F (36.9 °C)  Heart Rate:  [66-88] 66  Resp:  [14-20] 16  BP: (111-149)/(70-95) 135/76      Objective:  General Appearance:  Comfortable, well-appearing, in no acute distress and in pain.    Vital signs: (most recent): Blood pressure 144/92, pulse 71, temperature 98.1 °F (36.7 °C), temperature source Oral, resp. rate 16, height 195.6 cm (77\"), weight 130 kg (286 lb 13.1 oz), SpO2 94%.  Vital signs are normal.  No fever.    Output: Producing urine.    HEENT: Normal HEENT exam.    Lungs:  Normal effort and normal respiratory rate.  He is not in respiratory distress.    Heart: Normal rate.  Regular rhythm.    Chest: Symmetric chest wall expansion.   Extremities: Normal range of motion.    Skin:  Warm and dry.    Pulses: Distal pulses are intact.        Neurological Exam  Mental Status  Awake. Oriented to person, place and time. Speech is normal. Language is fluent with no aphasia. Attention and concentration are normal.    Cranial Nerves  CN I: Sense of smell is normal.  CN II: Right normal visual field. Left normal visual field.  CN III, IV, VI: Extraocular movements intact bilaterally. Pupils equal round and reactive to light bilaterally.  CN V: Facial sensation is normal.  CN VII:  Right: There is no facial weakness.  Left: There is no facial weakness.  CN XI: Shoulder shrug strength is normal.  CN XII: Tongue midline without atrophy or fasciculations.    Motor  Normal muscle bulk throughout. Normal muscle tone. Strength is 5/5 throughout all four extremities.    Gait  Normal casual, toe, heel and tandem gait.      Lab Results (last 24 hours)       Procedure Component Value Units Date/Time    Basic Metabolic Panel [[id number]]  (Abnormal) Collected: 05/27/25 0605    Specimen: Blood Updated: 05/27/25 0633     Glucose 122 mg/dL      BUN " 9 mg/dL      Creatinine 0.63 mg/dL      Sodium 140 mmol/L      Potassium 3.7 mmol/L      Chloride 105 mmol/L      CO2 25.0 mmol/L      Calcium 8.0 mg/dL      BUN/Creatinine Ratio 14.3     Anion Gap 10.0 mmol/L      eGFR 104.3 mL/min/1.73     Narrative:      GFR Categories in Chronic Kidney Disease (CKD)              GFR Category          GFR (mL/min/1.73)    Interpretation  G1                    90 or greater        Normal or high (1)  G2                    60-89                Mild decrease (1)  G3a                   45-59                Mild to moderate decrease  G3b                   30-44                Moderate to severe decrease  G4                    15-29                Severe decrease  G5                    14 or less           Kidney failure    (1)In the absence of evidence of kidney disease, neither GFR category G1 or G2 fulfill the criteria for CKD.    eGFR calculation 2021 CKD-EPI creatinine equation, which does not include race as a factor    CBC & Differential [[id number]]  (Abnormal) Collected: 05/27/25 0605    Specimen: Blood Updated: 05/27/25 0626    Narrative:      The following orders were created for panel order CBC & Differential.  Procedure                               Abnormality         Status                     ---------                               -----------         ------                     CBC Auto Differential[[id number]]        Abnormal            Final result                 Please view results for these tests on the individual orders.    CBC Auto Differential [[id number]]  (Abnormal) Collected: 05/27/25 0605    Specimen: Blood Updated: 05/27/25 0626     WBC 9.80 10*3/mm3      RBC 3.68 10*6/mm3      Hemoglobin 12.2 g/dL      Hematocrit 37.5 %      .9 fL      MCH 33.2 pg      MCHC 32.5 g/dL      RDW 13.2 %      RDW-SD 50.1 fl      MPV 9.8 fL      Platelets 168 10*3/mm3      Neutrophil % 69.2 %      Lymphocyte % 14.3 %      Monocyte % 13.3 %      Eosinophil % 2.3 %       Basophil % 0.5 %      Immature Grans % 0.4 %      Neutrophils, Absolute 6.78 10*3/mm3      Lymphocytes, Absolute 1.40 10*3/mm3      Monocytes, Absolute 1.30 10*3/mm3      Eosinophils, Absolute 0.23 10*3/mm3      Basophils, Absolute 0.05 10*3/mm3      Immature Grans, Absolute 0.04 10*3/mm3      nRBC 0.0 /100 WBC     Narrative:      OR yesterday    POC Glucose Once [[id number]]  (Abnormal) Collected: 05/26/25 1425    Specimen: Blood Updated: 05/26/25 1437     Glucose 170 mg/dL      Comment: : [id number] Ebony Lambert ID: [id number]       Comprehensive Metabolic Panel [[id number]]  (Abnormal) Collected: 05/26/25 1019    Specimen: Blood Updated: 05/26/25 1045     Glucose 108 mg/dL      BUN 8 mg/dL      Creatinine 0.61 mg/dL      Sodium 141 mmol/L      Potassium 3.6 mmol/L      Comment: Slight hemolysis detected by analyzer. Result may be falsely elevated.        Chloride 104 mmol/L      CO2 26.0 mmol/L      Calcium 9.1 mg/dL      Total Protein 6.8 g/dL      Albumin 3.8 g/dL      ALT (SGPT) 16 U/L      AST (SGOT) 19 U/L      Alkaline Phosphatase 82 U/L      Total Bilirubin 0.8 mg/dL      Globulin 3.0 gm/dL      A/G Ratio 1.3 g/dL      BUN/Creatinine Ratio 13.1     Anion Gap 11.0 mmol/L      eGFR 105.3 mL/min/1.73     Narrative:      GFR Categories in Chronic Kidney Disease (CKD)              GFR Category          GFR (mL/min/1.73)    Interpretation  G1                    90 or greater        Normal or high (1)  G2                    60-89                Mild decrease (1)  G3a                   45-59                Mild to moderate decrease  G3b                   30-44                Moderate to severe decrease  G4                    15-29                Severe decrease  G5                    14 or less           Kidney failure    (1)In the absence of evidence of kidney disease, neither GFR category G1 or G2 fulfill the criteria for CKD.    eGFR calculation 2021 CKD-EPI creatinine equation, which does not  include race as a factor    CBC & Differential [[id number]]  (Abnormal) Collected: 05/26/25 1019    Specimen: Blood Updated: 05/26/25 1027    Narrative:      The following orders were created for panel order CBC & Differential.  Procedure                               Abnormality         Status                     ---------                               -----------         ------                     CBC Auto Differential[[id number]]        Abnormal            Final result                 Please view results for these tests on the individual orders.    CBC Auto Differential [[id number]]  (Abnormal) Collected: 05/26/25 1019    Specimen: Blood Updated: 05/26/25 1027     WBC 10.40 10*3/mm3      RBC 4.61 10*6/mm3      Hemoglobin 15.5 g/dL      Hematocrit 45.6 %      MCV 98.9 fL      MCH 33.6 pg      MCHC 34.0 g/dL      RDW 13.2 %      RDW-SD 48.5 fl      MPV 9.8 fL      Platelets 216 10*3/mm3      Neutrophil % 60.4 %      Lymphocyte % 26.5 %      Monocyte % 11.6 %      Eosinophil % 0.8 %      Basophil % 0.4 %      Immature Grans % 0.3 %      Neutrophils, Absolute 6.28 10*3/mm3      Lymphocytes, Absolute 2.76 10*3/mm3      Monocytes, Absolute 1.21 10*3/mm3      Eosinophils, Absolute 0.08 10*3/mm3      Basophils, Absolute 0.04 10*3/mm3      Immature Grans, Absolute 0.03 10*3/mm3      nRBC 0.0 /100 WBC                 Plan:   Patient to work with PT/OT today. Will evaluate patient with later today for need for rehab. Dressing changes daily      Closed fracture of odontoid process of axis    Odontoid fracture        Harley Curtis, APRN

## 2025-05-28 ENCOUNTER — READMISSION MANAGEMENT (OUTPATIENT)
Dept: CALL CENTER | Facility: HOSPITAL | Age: 70
End: 2025-05-28
Payer: MEDICARE

## 2025-05-28 VITALS
SYSTOLIC BLOOD PRESSURE: 151 MMHG | HEART RATE: 75 BPM | RESPIRATION RATE: 18 BRPM | WEIGHT: 286.82 LBS | DIASTOLIC BLOOD PRESSURE: 61 MMHG | TEMPERATURE: 98.3 F | OXYGEN SATURATION: 98 % | BODY MASS INDEX: 33.87 KG/M2 | HEIGHT: 77 IN

## 2025-05-28 PROCEDURE — 99024 POSTOP FOLLOW-UP VISIT: CPT | Performed by: NURSE PRACTITIONER

## 2025-05-28 PROCEDURE — 97116 GAIT TRAINING THERAPY: CPT

## 2025-05-28 PROCEDURE — 97535 SELF CARE MNGMENT TRAINING: CPT

## 2025-05-28 RX ORDER — OXYCODONE AND ACETAMINOPHEN 7.5; 325 MG/1; MG/1
1 TABLET ORAL EVERY 6 HOURS PRN
Qty: 24 TABLET | Refills: 0 | Status: SHIPPED | OUTPATIENT
Start: 2025-05-28

## 2025-05-28 RX ORDER — OXYCODONE AND ACETAMINOPHEN 7.5; 325 MG/1; MG/1
1 TABLET ORAL EVERY 6 HOURS PRN
Start: 2025-05-28 | End: 2025-06-03

## 2025-05-28 RX ORDER — CYCLOBENZAPRINE HCL 10 MG
10 TABLET ORAL 3 TIMES DAILY PRN
Qty: 90 TABLET | Refills: 0 | Status: SHIPPED | OUTPATIENT
Start: 2025-05-28

## 2025-05-28 RX ADMIN — AMLODIPINE BESYLATE 5 MG: 5 TABLET ORAL at 08:46

## 2025-05-28 RX ADMIN — METOPROLOL SUCCINATE 100 MG: 100 TABLET, EXTENDED RELEASE ORAL at 08:46

## 2025-05-28 RX ADMIN — PANTOPRAZOLE SODIUM 40 MG: 40 TABLET, DELAYED RELEASE ORAL at 05:23

## 2025-05-28 RX ADMIN — DOCUSATE SODIUM 50 MG AND SENNOSIDES 8.6 MG 2 TABLET: 8.6; 5 TABLET, FILM COATED ORAL at 08:45

## 2025-05-28 RX ADMIN — LISINOPRIL 40 MG: 20 TABLET ORAL at 08:45

## 2025-05-28 RX ADMIN — ACETAMINOPHEN 650 MG: 325 TABLET ORAL at 08:45

## 2025-05-28 RX ADMIN — ASPIRIN 81 MG: 81 TABLET, COATED ORAL at 08:45

## 2025-05-28 RX ADMIN — OXYCODONE HYDROCHLORIDE AND ACETAMINOPHEN 1 TABLET: 7.5; 325 TABLET ORAL at 02:09

## 2025-05-28 NOTE — PLAN OF CARE
Goal Outcome Evaluation:  Plan of Care Reviewed With: patient        Progress: improving  Outcome Evaluation: Pt AO X4 on RA. Posterior dressing CDI. C/o pain, PRN given with good relief. Up with SB assist. IVF infusing. Call light in reach.Aspen collar on at all times.VSS.

## 2025-05-28 NOTE — THERAPY DISCHARGE NOTE
Acute Care - Occupational Therapy Treatment Note/Discharge  [facility]     Patient Name: Teo Jones  : 1955  MRN: [id number]  Today's Date: 2025  Onset of Illness/Injury or Date of Surgery: 25     Referring Physician: Dr. Barrett      Admit Date: 2025       ICD-10-CM ICD-9-CM   1. Closed odontoid fracture, initial encounter  S12.100A 805.02   2. Impaired mobility [Z74.09]  Z74.09 799.89   3. Closed odontoid fracture with routine healing, subsequent encounter  S12.100D V54.17     Patient Active Problem List   Diagnosis    Hypertension    Hyperlipidemia    Tobacco use    Abdominal aortic aneurysm (AAA) greater than 5.5 cm in diameter in male    Acute bronchitis    Arthritis    Amaro's esophagus    Chest pain    COPD (chronic obstructive pulmonary disease)    Dizziness    Foreign body    Prediabetes    Gout    Abdominal aortic aneurysm (AAA)    Stenosis of other vascular prosthetic devices, implants and grafts, initial encounter    S/P AAA (abdominal aortic aneurysm) repair    PAF (paroxysmal atrial fibrillation)    Epistaxis    Presence of Watchman left atrial appendage closure device    Gastroesophageal reflux disease    Amaro's esophagus determined by biopsy    Closed fracture of odontoid process of axis    Odontoid fracture     Past Medical History:   Diagnosis Date    Abnormal ECG     Aneurysm     Arrhythmia     Asthma     Atrial fibrillation     COPD (chronic obstructive pulmonary disease)     Elevated cholesterol     GERD (gastroesophageal reflux disease)     Hyperlipidemia     Hypertension     Type 2 diabetes mellitus 2021     Past Surgical History:   Procedure Laterality Date    ABDOMINAL AORTIC ANEURYSM REPAIR WITH ENDOGRAFT N/A 11/10/2021    Procedure: ABDOMINAL AORTIC ANEURYSM REPAIR WITH ENDOGRAFT;  Surgeon: Khai Reynolds DO;  Location: [place] [address];  Service: Vascular;  Laterality: N/A;    AORTOGRAM N/A 12/15/2021    Procedure: AORTOILIAC ANGIOGRAM,  BILATERAL ILIAC BALLOON ANGIOPLASTY, MYNX CLOSURE;  Surgeon: Khai Reynolds DO;  Location:  [facility] HYBRID OR 12;  Service: Vascular;  Laterality: N/A;    APPENDECTOMY      ATRIAL APPENDAGE EXCLUSION LEFT WITH TRANSESOPHAGEAL ECHOCARDIOGRAM Right 5/9/2023    Procedure: Atrial Appendage Occlusion;  Surgeon: Ihsan Zuniga MD;  Location:  [facility] CATH INVASIVE LOCATION;  Service: Cardiology;  Laterality: Right;    CARDIAC ELECTROPHYSIOLOGY PROCEDURE N/A 12/4/2024    Procedure: Ablation atrial fibrillation - PFA;  Surgeon: Sea Howe MD;  Location:  [facility] CATH INVASIVE [facility];  Service: Cardiovascular;  Laterality: N/A;    CERVICAL DISCECTOMY POSTERIOR FUSION WITH INSTRUMENTATION N/A 5/26/2025    Procedure: CERVICAL DISCECTOMY POSTERIOR FUSION WITH O-ARM C1-3;  Surgeon: Raghu Barrett MD;  Location:  [facility] OR;  Service: Neurosurgery;  Laterality: N/A;       OT ASSESSMENT FLOWSHEET (Last 12 Hours)       OT Evaluation and Treatment       Row Name 05/28/25 1006                   OT Time and Intention    Subjective Information complains of;pain  -AC (r) BH (t) AC (c)        Document Type therapy note (daily note)  -AC (r) BH (t) AC (c)        Mode of Treatment occupational therapy  -AC (r) BH (t) AC (c)           General Information    Existing Precautions/Restrictions fall;cervical collar;brace on at all times  -AC (r) BH (t) AC (c)           Pain Assessment    Pretreatment Pain Rating 5/10  -AC (r) BH (t) AC (c)        Posttreatment Pain Rating 5/10  -AC (r) BH (t) AC (c)        Pain Location neck  -AC (r) BH (t) AC (c)        Pain Side/Orientation upper;posterior  -AC (r) BH (t) AC (c)        Pain Management Interventions exercise or physical activity utilized;premedicated for activity  -AC (r) BH (t) AC (c)        Response to Pain Interventions activity participation with tolerable pain  -AC (r) BH (t) AC (c)           Cognition    Personal Safety Interventions fall prevention program maintained;gait  belt;muscle strengthening facilitated;nonskid shoes/slippers when out of bed;supervised activity  -AC (r) BH (t) AC (c)           Activities of Daily Living    BADL Assessment/Intervention lower body dressing  -AC (r) BH (t) AC (c)           Lower Body Dressing Assessment/Training    [place] Level (Lower Body Dressing) don;doff;socks;standby assist  -AC (r) BH (t) AC (c)        Position (Lower Body Dressing) edge of bed sitting;unsupported sitting  -AC (r) BH (t) AC (c)           Bed Mobility    Bed Mobility rolling left;sit-supine;supine-sit  -AC (r) BH (t) AC (c)        Rolling Left [place] (Bed Mobility) standby assist  -AC (r) BH (t) AC (c)        Supine-Sit [place] (Bed Mobility) standby assist;verbal cues  -AC (r) BH (t) AC (c)        Sit-Supine [place] (Bed Mobility) standby assist  -AC (r) BH (t) AC (c)        Assistive Device (Bed Mobility) head of bed elevated  -AC (r) BH (t) AC (c)           Functional Mobility    Functional Mobility- Ind. Level contact guard assist  -AC (r) BH (t) AC (c)        Functional Mobility- Comment From bed to chair  -AC (r) BH (t) AC (c)           Transfer Assessment/Treatment    Transfers sit-stand transfer;stand-sit transfer;bed-chair transfer  -AC (r) BH (t) AC (c)           Bed-Chair Transfer    Bed-Chair [place] (Transfers) standby assist  -AC (r) BH (t) AC (c)           Sit-Stand Transfer    Sit-Stand [place] (Transfers) standby assist  -AC (r) BH (t) AC (c)           Stand-Sit Transfer    Stand-Sit [place] (Transfers) standby assist  -AC (r) BH (t) AC (c)           Wound 05/26/25 1228 posterior cervical spine Surgical    Wound - Properties Group Placement Date: 05/26/25  -DT Placement Time: 1228 -DT Orientation: posterior  -DT Location: cervical spine  -DT Primary Wound Type: Surgical  -DT    Retired Wound - Properties Group Placement Date: 05/26/25  -DT Placement Time: 1228 -DT Orientation: posterior  -DT Location: cervical spine  -DT     Retired Wound - Properties Group Placement Date: 05/26/25 -DT Placement Time: 1228 -DT Orientation: posterior  -DT Location: cervical spine  -DT    Retired Wound - Properties Group Date first assessed: 05/26/25 -DT Time first assessed: 1228 -DT Location: cervical spine  -DT       Plan of Care Review    Plan of Care Reviewed With patient  -AC (r) BH (t) AC (c)        Progress improving  -AC (r) BH (t) AC (c)        Outcome Evaluation OT tx complete. Pt seen in [place] with wife at bedside. Pt currently states he is waiting on final d/c papers and will leave then with wife. Pt c/o of pain in posterior neck but agreeable to participating in tx. Pt able to recall 3/3 spinal precautions when prompted. Pt and wife educated on donning/doffing c collar, how to adjust, how to wash pads, given extra pads, and putting layers between the collar and skin. Pt practiced proper log rolling technique to follow spinal precautions with SBA. Pt and wife educated on proper environmental modifications to implement at home to reduce risk of falls and following spinal precautions while completing ADL's. Pt completed all functional mobility, t/fs, and donning/doffing socks with SBA and no c/o of dizziness or fatigue. Pt is I at baseline but states he has a high tolerance for pain but will be cautious while he is at home to follow spinal precautions and reduce risk of falls. OT will sign off at this time. Recommend home with assist at d/c.  -AC (r) BH (t) AC (c)           Positioning and Restraints    Pre-Treatment Position in bed  -AC (r) BH (t) AC (c)        Post Treatment Position chair  -AC (r) BH (t) AC (c)        In Bed sitting;encouraged to call for assist;exit alarm on;patient within staff view;with brace (P)   -BH           Therapy Plan Review/Discharge Plan (OT)    Anticipated Discharge Disposition (OT) home with assist  -AC (r) BH (t) AC (c)           Safety Awareness Goal 1 (OT)    Activity (Safety Awareness Goal 1, OT)  follow through of safety precautions  -AC (r) BH (t) AC (c)        [place]/Cues/Accuracy (Safety Awareness Goal 1, OT) independent  -AC (r) BH (t) AC (c)        Time Frame (Safety Awareness Goal 1, OT) 10 days  -AC (r) BH (t) AC (c)        Progress/Outcome (Safety Awareness Goal 1, OT) goal met  -AC (r) BH (t) AC (c)           Problem Specific Goal 1 (OT)    Problem Specific Goal 1 (OT) Pt will independently implement one pain management technique to decrease pain and improve functional performance.  -AC (r) BH (t) AC (c)        Time Frame (Problem Specific Goal 1, OT) long term goal (LTG);10 days  -AC (r) BH (t) AC (c)        Progress/Outcome (Problem Specific Goal 1, OT) goal not met  -AC (r) BH (t) AC (c)                  User Key  (r) = Recorded By, (t) = Taken By, (c) = Cosigned By      Initials Name Effective Dates    Duc Esteves, OTR/L, CNT 02/03/23 -     Elvis Christiansen RN 02/17/22 -     Luther Briceño, OT Student 05/12/25 -                     Occupational Therapy Education       Title: PT OT SLP Therapies (In Progress)       Topic: Occupational Therapy (In Progress)       Point: ADL training (Done)       Learning Progress Summary            Patient Acceptance, E,TB,D, VU,DU by  at 5/28/2025 1041    Comment: Donning/doffing c collar, spinal precautions, bed mobility, environmental modifications to reduce risk of falls.    Acceptance, E,D,TB, VU,DU by  at 5/27/2025 0846    Comment: OT POC, OT role in care, bed mobility training, t/f training, spinal precautions.   Family Acceptance, E,TB,D, VU,DU by  at 5/28/2025 1041    Comment: Donning/doffing c collar, spinal precautions, bed mobility, environmental modifications to reduce risk of falls.                      Point: Precautions (Done)       Learning Progress Summary            Patient Acceptance, E,TB,D, VU,DU by  at 5/28/2025 1041    Comment: Donning/doffing c collar, spinal precautions, bed mobility, environmental  modifications to reduce risk of falls.    Acceptance, E,D,TB, VU,DU by  at 5/27/2025 0846    Comment: OT POC, OT role in care, bed mobility training, t/f training, spinal precautions.   Family Acceptance, E,TB,D, VU,DU by  at 5/28/2025 1041    Comment: Donning/doffing c collar, spinal precautions, bed mobility, environmental modifications to reduce risk of falls.                      Point: Body mechanics (Done)       Learning Progress Summary            Patient Acceptance, E,TB,D, VU,DU by  at 5/28/2025 1041    Comment: Donning/doffing c collar, spinal precautions, bed mobility, environmental modifications to reduce risk of falls.    Acceptance, E,D,TB, VU,DU by  at 5/27/2025 0846    Comment: OT POC, OT role in care, bed mobility training, t/f training, spinal precautions.   Family Acceptance, E,TB,D, VU,DU by  at 5/28/2025 1041    Comment: Donning/doffing c collar, spinal precautions, bed mobility, environmental modifications to reduce risk of falls.                                      User Key       Initials Effective Dates Name Provider Type Discipline     05/12/25 -  Luther Collins, OT Student OT Student OT                    OT Recommendation and Plan  Planned Therapy Interventions (OT): activity tolerance training, adaptive equipment training, BADL retraining, functional balance retraining, occupation/activity based interventions, patient/caregiver education/training, strengthening exercise, transfer/mobility retraining  Therapy Frequency (OT): 3 times/wk  Plan of Care Review  Plan of Care Reviewed With: patient  Progress: improving  Outcome Evaluation: OT tx complete. Pt seen in [place] with wife at bedside. Pt currently states he is waiting on final d/c papers and will leave then with wife. Pt c/o of pain in posterior neck but agreeable to participating in tx. Pt able to recall 3/3 spinal precautions when prompted. Pt and wife educated on donning/doffing c collar, how to adjust, how to wash pads,  given extra pads, and putting layers between the collar and skin. Pt practiced proper log rolling technique to follow spinal precautions with SBA. Pt and wife educated on proper environmental modifications to implement at home to reduce risk of falls and following spinal precautions while completing ADL's. Pt completed all functional mobility, t/fs, and donning/doffing socks with SBA and no c/o of dizziness or fatigue. Pt is I at baseline but states he has a high tolerance for pain but will be cautious while he is at home to follow spinal precautions and reduce risk of falls. OT will sign off at this time. Recommend home with assist at d/c.  Plan of Care Reviewed With: patient  Outcome Evaluation: OT tx complete. Pt seen in [facility] with wife at bedside. Pt currently states he is waiting on final d/c papers and will leave then with wife. Pt c/o of pain in posterior neck but agreeable to participating in tx. Pt able to recall 3/3 spinal precautions when prompted. Pt and wife educated on donning/doffing c collar, how to adjust, how to wash pads, given extra pads, and putting layers between the collar and skin. Pt practiced proper log rolling technique to follow spinal precautions with SBA. Pt and wife educated on proper environmental modifications to implement at home to reduce risk of falls and following spinal precautions while completing ADL's. Pt completed all functional mobility, t/fs, and donning/doffing socks with SBA and no c/o of dizziness or fatigue. Pt is I at baseline but states he has a high tolerance for pain but will be cautious while he is at home to follow spinal precautions and reduce risk of falls. OT will sign off at this time. Recommend home with assist at d/c.      OT Rehab Goals       Row Name 05/28/25 1006             Safety Awareness Goal 1 (OT)    Activity (Safety Awareness Goal 1, OT) follow through of safety precautions  -AC (r) BH (t) AC (c)      [place]/Cues/Accuracy (Safety Awareness  Goal 1, OT) independent  -AC (r) BH (t) AC (c)      Time Frame (Safety Awareness Goal 1, OT) 10 days  -AC (r) BH (t) AC (c)      Progress/Outcome (Safety Awareness Goal 1, OT) goal met  -AC (r) BH (t) AC (c)         Problem Specific Goal 1 (OT)    Problem Specific Goal 1 (OT) Pt will independently implement one pain management technique to decrease pain and improve functional performance.  -AC (r) BH (t) AC (c)      Time Frame (Problem Specific Goal 1, OT) long term goal (LTG);10 days  -AC (r) BH (t) AC (c)      Progress/Outcome (Problem Specific Goal 1, OT) goal not met  -AC (r) BH (t) AC (c)                User Key  (r) = Recorded By, (t) = Taken By, (c) = Cosigned By      Initials Name Provider Type Discipline    Duc Esteves, OTR/L, CNT Occupational Therapist OT    Luther Briceño, OT Student OT Student OT                     Outcome Measures       Row Name 05/28/25 1006 05/27/25 0757          How much help from another is currently needed...    Putting on and taking off regular lower body clothing? 4  -AC (r) BH (t) AC (c) 4  -AC (r) BH (t) AC (c)     Bathing (including washing, rinsing, and drying) 3  -AC (r) BH (t) AC (c) 3  -AC (r) BH (t) AC (c)     Toileting (which includes using toilet bed pan or urinal) 3  -AC (r) BH (t) AC (c) 3  -AC (r) BH (t) AC (c)     Putting on and taking off regular upper body clothing 3  -AC (r) BH (t) AC (c) 3  -AC (r) BH (t) AC (c)     Taking care of personal grooming (such as brushing teeth) 4  -AC (r) BH (t) AC (c) 4  -AC (r) BH (t) AC (c)     Eating meals 4  -AC (r) BH (t) AC (c) 4  -AC (r) BH (t) AC (c)     AM-PAC 6 Clicks Score (OT) 21  -AC (r) BH (t) 21  -AC (r) BH (t)        Functional Assessment    Outcome Measure Options AM-PAC 6 Clicks Daily Activity (OT)  -AC (r) BH (t) AC (c) AM-PAC 6 Clicks Daily Activity (OT)  -AC (r) BH (t) AC (c)               User Key  (r) = Recorded By, (t) = Taken By, (c) = Cosigned By      Initials Name Provider Type    SWETHA Saba,  Duc MOSLEY, OTR/L, CNT Occupational Therapist    Luther Briceño, OT Student OT Student                    Time Calculation:    Time Calculation- OT       Row Name 05/28/25 1029 05/28/25 0903          Time Calculation- OT    OT Start Time 1003  -AC (r) BH (t) AC (c) --     OT Stop Time 1029  -AC (r) BH (t) AC (c) --     OT Time Calculation (min) 26 min  -AC (r) BH (t) --     Total Timed Code Minutes- OT 26 minute(s)  -AC (r) BH (t) AC (c) --     OT Received On 05/28/25  -AC (r) BH (t) AC (c) --        Timed Charges    52072 - Gait Training Minutes  -- 23  -NW     47877 - OT Self Care/Mgmt Minutes 26  -AC (r) BH (t) AC (c) --        Total Minutes    Timed Charges Total Minutes 26  -AC (r) BH (t) 23  -NW      Total Minutes 26  -AC (r) BH (t) 23  -NW               User Key  (r) = Recorded By, (t) = Taken By, (c) = Cosigned By      Initials Name Provider Type    Duc Esteves, OTR/L, CNT Occupational Therapist    NW Nilda Bedolla, PTA Physical Therapist Assistant    Luther Briceño, OT Student OT Student                  Timed Therapy Charges  Total Units: 2      Suggested Charges  Total Units: 2      Procedure Name Documented Minutes Units Code    HC OT SELF CARE/MGMT/TRAIN EA 15 MIN 26 2   50773 (CPT®)                 Documented Minutes  Total Minutes: 26      Therapy Provided Minutes    06597 - OT Self Care/Mgmt Minutes 26                           OT Discharge Summary  Anticipated Discharge Disposition (OT): home with assist  Reason for Discharge: Discharge from facility  Outcomes Achieved: Patient able to partially acheive established goals  Discharge Destination: Home with assist    SIERRA Gonzalez  5/28/2025

## 2025-05-28 NOTE — DISCHARGE SUMMARY
Date of Discharge:  5/28/2025    Discharge Diagnosis: Odontoid fracture [S12.110A]    Presenting Problem/History of Present Illness  Odontoid fracture [S12.110A]       Hospital Course  Patient is a 69 y.o. male presented with Odontoid fracture [S12.110A].  The patient sustained a fall on May 25, 2025 hitting his head.  He had an onset of neck pain.  He ended up coming to the hospital on May 26 due to ongoing neck pain he was found that he did have an odontoid fracture as well as a C1 fracture.  The patient went to the operating room on 5/26/2025 for a Cervical Discectomy Posterior Fusion With O-arm C1-3.  The patient tolerated procedure well.  Currently the patient is ambulating.  The patient is tolerating p.o.  The patient is voiding spontaneously.  It is felt that at this time the patient is stable and suitable to be discharged home.  See orders for discharge instructions and restrictions.  The patient is to clean the incision daily with soap and water and keep the incision covered with a clean dressing while the collar is in place..  They are to call the office with any drainage from the incision or worsening pain.  I will follow-up with him in the office in 3 weeks.  He is not to take any NSAIDs.    Procedures Performed  Procedure(s):  CERVICAL DISCECTOMY POSTERIOR FUSION WITH O-ARM C1-3       Consults:   Consults       No orders found from 4/27/2025 to 5/27/2025.              Condition on Discharge: Stable    Vital Signs  Temp:  [97.8 °F (36.6 °C)-98.7 °F (37.1 °C)] 98.3 °F (36.8 °C)  Heart Rate:  [73-82] 75  Resp:  [18-20] 18  BP: (131-151)/(61-97) 151/61    Physical Exam:   Physical Exam  Constitutional:       General: He is awake.      Appearance: He is well-developed.   HENT:      Head: Normocephalic.   Eyes:      Extraocular Movements: Extraocular movements intact.      Pupils: Pupils are equal, round, and reactive to light.   Pulmonary:      Effort: Pulmonary effort is normal.   Musculoskeletal:          General: Normal range of motion.      Cervical back: Normal range of motion.   Skin:     General: Skin is warm.      Comments: Incision clean dry and   Neurological:      Mental Status: He is alert and oriented to person, place, and time.      GCS: GCS eye subscore is 4. GCS verbal subscore is 5. GCS motor subscore is 6.      Cranial Nerves: No cranial nerve deficit.      Sensory: No sensory deficit.      Motor: Motor strength is normal.     Gait: Gait normal.      Deep Tendon Reflexes: Reflexes are normal and symmetric.   Psychiatric:         Speech: Speech normal.         Behavior: Behavior normal.         Thought Content: Thought content normal.          Neurological Exam  Mental Status  Awake and alert. Oriented to person, place and time. Oriented to person, place, and time. Speech is normal. Language is fluent with no aphasia. Attention and concentration are normal.    Cranial Nerves  CN I: Sense of smell is normal.  CN II: Right normal visual field. Left normal visual field.  CN III, IV, VI: Extraocular movements intact bilaterally. Pupils equal round and reactive to light bilaterally.  CN V: Facial sensation is normal.  CN VII:  Right: There is no facial weakness.  Left: There is no facial weakness.  CN XI: Shoulder shrug strength is normal.  CN XII: Tongue midline without atrophy or fasciculations.    Motor  Normal muscle bulk throughout. Normal muscle tone. Strength is 5/5 throughout all four extremities.    Sensory  Sensation is intact to light touch, pinprick, vibration and proprioception in all four extremities.    Reflexes  Deep tendon reflexes are 2+ and symmetric in all four extremities.    Gait  Normal casual, toe, heel and tandem gait. Normal gait.         Discharge Disposition  Home or Self Care    Discharge Medications     Discharge Medications        New Medications        Instructions Start Date   cyclobenzaprine 10 MG tablet  Commonly known as: FLEXERIL   10 mg, Oral, 3 Times Daily PRN       naloxone 4 MG/0.1ML nasal spray  Commonly known as: NARCAN   1 spray, Nasal, As Needed      oxyCODONE-acetaminophen 7.5-325 MG per tablet  Commonly known as: PERCOCET   1 tablet, Oral, Every 6 Hours PRN             Continue These Medications        Instructions Start Date   allopurinol 300 MG tablet  Commonly known as: ZYLOPRIM   300 mg, Nightly      amLODIPine 5 MG tablet  Commonly known as: NORVASC   5 mg, Oral, Daily      aspirin 81 MG EC tablet   81 mg, Oral, Daily      atorvastatin 20 MG tablet  Commonly known as: LIPITOR   20 mg, Oral, Nightly      eszopiclone 3 MG tablet  Commonly known as: Lunesta   3 mg, Oral, Nightly, Take immediately before bedtime      famotidine 40 MG tablet  Commonly known as: PEPCID   40 mg, Oral, Nightly      lisinopril 40 MG tablet  Commonly known as: PRINIVIL,ZESTRIL   40 mg, Oral, Daily      metoprolol succinate  MG 24 hr tablet  Commonly known as: TOPROL-XL   1 tablet, Daily      omeprazole 40 MG capsule  Commonly known as: priLOSEC   40 mg, Every Morning               Discharge Diet:   Diet Instructions       Diet: Regular/House Diet; Regular Texture (IDDSI 7); Thin (IDDSI 0)      Discharge Diet: Regular/House Diet    Texture: Regular Texture (IDDSI 7)    Fluid Consistency: Thin (IDDSI 0)            Activity at Discharge:   Activity Instructions       Other Instructions (Specify)      Activity Instructions: No strenuous activity, no driving, wear cervical collar at all times.  No NSAIDs            Follow-up Appointments  Future Appointments   Date Time Provider Department Center   10/3/2025  9:00 AM Valdo Manrique APRN [facility]   10/14/2025  1:45 PM Maurice Caraballo MD [facility]   11/26/2025  2:15 PM Ihsan Zuniga MD [facility]   1/8/2026 12:30 PM PAD CT 1 BH PAD CAT [facility]AD   1/8/2026  1:00 PM [facility] US [facility] CART 3 BH [facility]   1/8/2026  2:00 PM [facility] US [facility] CART 3 BH PAD [facility]   1/15/2026 10:30 AM Marlene Shankar APRN [facility]      Additional Instructions for the Follow-ups that You Need to Schedule       Call MD With Problems / Concerns   As directed      Instructions: Worsening neck or arm pain, drainage from wound, fever, difficulty breathing or swallowing.    Order Comments: Instructions: Worsening neck or arm pain, drainage from wound, fever, difficulty breathing or swallowing.         Discharge Follow-up with Specialty: mela gillespie np; 3 Weeks   As directed      Specialty: mela gillespie np   Follow Up: 3 Weeks                Test Results Pending at Discharge       ALYSSA Lisa  05/28/25  08:27 CDT    Time: Discharge 20 min

## 2025-05-28 NOTE — THERAPY TREATMENT NOTE
Acute Care - Physical Therapy Treatment Note  [facility]     Patient Name: Teo Jones  : 1955  MRN: [id number]  Today's Date: 2025   Onset of Illness/Injury or Date of Surgery: 25  Visit Dx:     ICD-10-CM ICD-9-CM   1. Closed odontoid fracture, initial encounter  S12.100A 805.02   2. Impaired mobility [Z74.09]  Z74.09 799.89   3. Closed odontoid fracture with routine healing, subsequent encounter  S12.100D V54.17     Patient Active Problem List   Diagnosis    Hypertension    Hyperlipidemia    Tobacco use    Abdominal aortic aneurysm (AAA) greater than 5.5 cm in diameter in male    Acute bronchitis    Arthritis    Amaro's esophagus    Chest pain    COPD (chronic obstructive pulmonary disease)    Dizziness    Foreign body    Prediabetes    Gout    Abdominal aortic aneurysm (AAA)    Stenosis of other vascular prosthetic devices, implants and grafts, initial encounter    S/P AAA (abdominal aortic aneurysm) repair    PAF (paroxysmal atrial fibrillation)    Epistaxis    Presence of Watchman left atrial appendage closure device    Gastroesophageal reflux disease    Amaro's esophagus determined by biopsy    Closed fracture of odontoid process of axis    Odontoid fracture     Past Medical History:   Diagnosis Date    Abnormal ECG     Aneurysm     Arrhythmia     Asthma     Atrial fibrillation     COPD (chronic obstructive pulmonary disease)     Elevated cholesterol     GERD (gastroesophageal reflux disease)     Hyperlipidemia     Hypertension     Type 2 diabetes mellitus 2021     Past Surgical History:   Procedure Laterality Date    ABDOMINAL AORTIC ANEURYSM REPAIR WITH ENDOGRAFT N/A 11/10/2021    Procedure: ABDOMINAL AORTIC ANEURYSM REPAIR WITH ENDOGRAFT;  Surgeon: Khai Reynolds DO;  Location: [place] [address];  Service: Vascular;  Laterality: N/A;    AORTOGRAM N/A 12/15/2021    Procedure: AORTOILIAC ANGIOGRAM, BILATERAL ILIAC BALLOON ANGIOPLASTY, MYNX CLOSURE;  Surgeon: Gail  Khai ULLOA DO;  Location: [facility] HYBRID OR 12;  Service: Vascular;  Laterality: N/A;    APPENDECTOMY      ATRIAL APPENDAGE EXCLUSION LEFT WITH TRANSESOPHAGEAL ECHOCARDIOGRAM Right 5/9/2023    Procedure: Atrial Appendage Occlusion;  Surgeon: Ihsan Zuniga MD;  Location: [facility] CATH INVASIVE LOCATION;  Service: Cardiology;  Laterality: Right;    CARDIAC ELECTROPHYSIOLOGY PROCEDURE N/A 12/4/2024    Procedure: Ablation atrial fibrillation - PFA;  Surgeon: Sea Howe MD;  Location:  [facility] CATH INVASIVE LOCATION;  Service: Cardiovascular;  Laterality: N/A;    CERVICAL DISCECTOMY POSTERIOR FUSION WITH INSTRUMENTATION N/A 5/26/2025    Procedure: CERVICAL DISCECTOMY POSTERIOR FUSION WITH O-ARM C1-3;  Surgeon: Raghu aBrrett MD;  Location: [facility] OR;  Service: Neurosurgery;  Laterality: N/A;     PT Assessment (Last 12 Hours)       PT Evaluation and Treatment       Row Name 05/28/25 0847 05/28/25 0837       Physical Therapy Time and Intention    Subjective Information --  -NW complains of;pain  -NW    Document Type -- therapy note (daily note)  -NW    Mode of Treatment -- physical therapy  -NW      Row Name 05/28/25 0837          General Information    Existing Precautions/Restrictions fall;cervical collar;brace on at all times  -NW       Row Name 05/28/25 0837          Pain    Posttreatment Pain Rating 5/10  -NW     Pain Location neck  -NW       Row Name 05/28/25 0837          Bed Mobility    Comment, (Bed Mobility) sitting EOB  -NW       Row Name 05/28/25 0837          Sit-Stand Transfer    Sit-Stand [place] (Transfers) supervision  -NW       Row Name 05/28/25 0837          Stand-Sit Transfer    Stand-Sit [place] (Transfers) supervision  -NW       Row Name 05/28/25 0837          Gait/Stairs (Locomotion)    [place] Level (Gait) supervision  -NW     Distance in Feet (Gait) 200  -NW     Pattern (Gait) step-through  -NW     [place] Level (Stairs) verbal cues;stand by assist  -NW     Handrail Location  (Stairs) right side (ascending)  -NW     Number of Steps (Stairs) 9  -NW     Ascending Technique (Stairs) step-over-step  -NW     Descending Technique (Stairs) step-over-step  -NW     Comment, (Gait/Stairs) discussion w/ pt and family. POC. changing out collar pads, stairs, car transfer etc  -NW       Row Name 05/28/25 0837          Safety Issues/Impairments Affecting Functional Mobility    Impairments Affecting Function (Mobility) pain;balance  -NW       Row Name             Wound 05/26/25 1228 posterior cervical spine Surgical    Wound - Properties Group Placement Date: 05/26/25  -DT Placement Time: 1228 -DT Orientation: posterior  -DT Location: cervical spine  -DT Primary Wound Type: Surgical  -DT    Retired Wound - Properties Group Placement Date: 05/26/25  -DT Placement Time: 1228 -DT Orientation: posterior  -DT Location: cervical spine  -DT    Retired Wound - Properties Group Placement Date: 05/26/25  -DT Placement Time: 1228  -DT Orientation: posterior  -DT Location: cervical spine  -DT    Retired Wound - Properties Group Date first assessed: 05/26/25  -DT Time first assessed: 1228 -DT Location: cervical spine  -DT      Row Name 05/28/25 0837          Positioning and Restraints    Pre-Treatment Position in bed  -NW     Post Treatment Position chair  -NW     In Bed sitting;call light within reach;encouraged to call for assist;with family/caregiver  -NW               User Key  (r) = Recorded By, (t) = Taken By, (c) = Cosigned By      Initials Name Provider Type    Nilda De La Garza PTA Physical Therapist Assistant    DT Elvis Larson RN Registered Nurse                    Physical Therapy Education       Title: PT OT SLP Therapies (In Progress)       Topic: Physical Therapy (In Progress)       Point: Mobility training (Done)       Learning Progress Summary            Patient Acceptance, E, VU by MS at 5/27/2025 5388    Comment: role of PT in his care, spinal restrictions, use of cervical collar                       Point: Home exercise program (Not Started)       Learner Progress:  Not documented in this visit.              Point: Body mechanics (Not Started)       Learner Progress:  Not documented in this visit.              Point: Precautions (Done)       Learning Progress Summary            Patient Acceptance, E, VU by MS at 5/27/2025 0978    Comment: role of PT in his care, spinal restrictions, use of cervical collar                                      User Key       Initials Effective Dates Name Provider Type Discipline    MS 07/11/23 -  Ailyn Dumont, PT, DPT, NCS Physical Therapist PT                  PT Recommendation and Plan         Outcome Measures       Row Name 05/27/25 0757             How much help from another is currently needed...    Putting on and taking off regular lower body clothing? 4  -AC (r) BH (t) AC (c)      Bathing (including washing, rinsing, and drying) 3  -AC (r) BH (t) AC (c)      Toileting (which includes using toilet bed pan or urinal) 3  -AC (r) BH (t) AC (c)      Putting on and taking off regular upper body clothing 3  -AC (r) BH (t) AC (c)      Taking care of personal grooming (such as brushing teeth) 4  -AC (r) BH (t) AC (c)      Eating meals 4  -AC (r) BH (t) AC (c)      AM-PAC 6 Clicks Score (OT) 21  -AC (r) BH (t)         Functional Assessment    Outcome Measure Options AM-PAC 6 Clicks Daily Activity (OT)  -AC (r) BH (t) AC (c)                User Key  (r) = Recorded By, (t) = Taken By, (c) = Cosigned By      Initials Name Provider Type    AC Duc Saba, OTR/L, CNT Occupational Therapist     Luther Collins, OT Student OT Student                     Time Calculation:    PT Charges       Row Name 05/28/25 0903             Time Calculation    Start Time 0837  -NW      Stop Time 0900  -NW      Time Calculation (min) 23 min  -NW      PT Received On 05/28/25  -NW      PT Goal Re-Cert Due Date 06/06/25  -NW         Time Calculation- PT    Total Timed Code Minutes- PT  23 minute(s)  -NW         Timed Charges    61948 - Gait Training Minutes  23  -NW         Total Minutes    Timed Charges Total Minutes 23  -NW       Total Minutes 23  -NW                User Key  (r) = Recorded By, (t) = Taken By, (c) = Cosigned By      Initials Name Provider Type    Nilda De La Garza PTA Physical Therapist Assistant                  Therapy Charges for Today       Code Description Service Date Service Provider Modifiers Qty    85761590160 HC GAIT TRAINING EA 15 MIN 5/28/2025 Nilda Bedolla PTA GP 2            PT G-Codes  Outcome Measure Options: AM-PAC 6 Clicks Daily Activity (OT)  AM-PAC 6 Clicks Score (PT): 19  AM-PAC 6 Clicks Score (OT): 21    Nilda Bedolla PTA  5/28/2025

## 2025-05-28 NOTE — PLAN OF CARE
Goal Outcome Evaluation:  Plan of Care Reviewed With: patient, caregiver        Progress: improving  Outcome Evaluation: pt AO on RA. Aspen collar on at all times. Dressing changed, medipore and gauze. CDI. Educated pt friend that will be staying with him on how to change dressing. Up with SB assist. VSS. DC home with friend.NV checks WDL.

## 2025-05-28 NOTE — THERAPY DISCHARGE NOTE
Acute Care - Physical Therapy Discharge Summary   [facility]       Patient Name: Teo Jones  : 1955  MRN: [id number]    Today's Date: 2025  Onset of Illness/Injury or Date of Surgery: 25       Referring Physician: Dr. Barrett      Admit Date: 2025      PT Recommendation and Plan    Visit Dx:    ICD-10-CM ICD-9-CM   1. Closed odontoid fracture, initial encounter  S12.100A 805.02   2. Impaired mobility [Z74.09]  Z74.09 799.89   3. Closed odontoid fracture with routine healing, subsequent encounter  S12.100D V54.17        Outcome Measures       Row Name 25 1006 25 0757          How much help from another is currently needed...    Putting on and taking off regular lower body clothing? 4  -AC (r) BH (t) AC (c) 4  -AC (r) BH (t) AC (c)     Bathing (including washing, rinsing, and drying) 3  -AC (r) BH (t) AC (c) 3  -AC (r) BH (t) AC (c)     Toileting (which includes using toilet bed pan or urinal) 3  -AC (r) BH (t) AC (c) 3  -AC (r) BH (t) AC (c)     Putting on and taking off regular upper body clothing 3  -AC (r) BH (t) AC (c) 3  -AC (r) BH (t) AC (c)     Taking care of personal grooming (such as brushing teeth) 4  -AC (r) BH (t) AC (c) 4  -AC (r) BH (t) AC (c)     Eating meals 4  -AC (r) BH (t) AC (c) 4  -AC (r) BH (t) AC (c)     AM-PAC 6 Clicks Score (OT) 21  -AC (r) BH (t) 21  -AC (r) BH (t)        Functional Assessment    Outcome Measure Options AM-PAC 6 Clicks Daily Activity (OT)  -AC (r) BH (t) AC (c) AM-PAC 6 Clicks Daily Activity (OT)  -AC (r) BH (t) AC (c)               User Key  (r) = Recorded By, (t) = Taken By, (c) = Cosigned By      Initials Name Provider Type    AC Duc Saba, OTR/L, CNT Occupational Therapist    Luther Briceño OT Student OT Student                     PT Charges       Row Name 25 0903             Time Calculation    Start Time 08  -NW      Stop Time 09  -NW      Time Calculation (min) 23 min  -NW      PT Received On 25  -NW       PT Goal Re-Cert Due Date 06/06/25  -NW         Time Calculation- PT    Total Timed Code Minutes- PT 23 minute(s)  -NW         Timed Charges    98583 - Gait Training Minutes  23  -NW         Total Minutes    Timed Charges Total Minutes 23  -NW       Total Minutes 23  -NW                User Key  (r) = Recorded By, (t) = Taken By, (c) = Cosigned By      Initials Name Provider Type    Nilda De La Garza, PTA Physical Therapist Assistant                     PT Rehab Goals       Row Name 05/28/25 1515             Bed Mobility Goal 1 (PT)    Activity/Assistive Device (Bed Mobility Goal 1, PT) bed mobility activities, all  -      [place] Level/Cues Needed (Bed Mobility Goal 1, PT) independent  -      Time Frame (Bed Mobility Goal 1, PT) long term goal (LTG);by discharge  -      Progress/Outcomes (Bed Mobility Goal 1, PT) goal met  -         Transfer Goal 1 (PT)    Activity/Assistive Device (Transfer Goal 1, PT) sit-to-stand/stand-to-sit;bed-to-chair/chair-to-bed  -      [place] Level/Cues Needed (Transfer Goal 1, PT) independent  -AH      Time Frame (Transfer Goal 1, PT) long term goal (LTG);by discharge  -      Progress/Outcome (Transfer Goal 1, PT) goal met  -         Gait Training Goal 1 (PT)    Activity/Assistive Device (Gait Training Goal 1, PT) gait (walking locomotion);decrease fall risk;improve balance and speed;increase endurance/gait distance  -      [place] Level (Gait Training Goal 1, PT) independent  -      Distance (Gait Training Goal 1, PT) 400ft  -AH      Time Frame (Gait Training Goal 1, PT) long term goal (LTG);by discharge  -      Progress/Outcome (Gait Training Goal 1, PT) goal not met  -         Balance Goal 1 (PT)    Activity/Assistive Device (Balance Goal) standing dynamic balance;unsupported;with functional mobility activities  -      [place] Level/Cues Needed (Balance Goal 1, PT) modified independence  -AH      Time Frame (Balance Goal 1, PT) long-term  goal (LTG);by discharge  -      Progress/Outcomes (Balance Goal 1, PT) goal not met  -                User Key  (r) = Recorded By, (t) = Taken By, (c) = Cosigned By      Initials Name Provider Type Discipline    Kat Jung, PTA Physical Therapist Assistant PT                        PT Discharge Summary  Reason for Discharge: Discharge from facility  Outcomes Achieved: Refer to plan of care for updates on goals achieved  Discharge Destination: Home      Kat Vital PTA   5/28/2025

## 2025-05-28 NOTE — PLAN OF CARE
Goal Outcome Evaluation:  Plan of Care Reviewed With: patient        Progress: improving  Outcome Evaluation: [facility]. Pt currently states he is waiting on final d/c papers and will leave then with wife. Pt c/o of pain in posterior neck but agreeable to participating in tx. Pt able to recall 3/3 spinal precautions when prompted. Pt and wife educated on donning/doffing c collar, how to adjust, how to wash pads, given extra pads, and putting layers between the collar and skin. Pt practiced proper log rolling technique to follow spinal precautions with SBA. Pt and wife educated on proper environmental modifications to implement at home to reduce risk of falls and following spinal precautions while completing ADL's. Pt completed all functional mobility, t/fs, and donning/doffing socks with SBA and no c/o of dizziness or fatigue. Pt is I at baseline but states he has a high tolerance for pain but will be cautious while he is at home to follow spinal precautions and reduce risk of falls. OT will sign off at this time. Recommend home with assist at d/c.    Anticipated Discharge Disposition (OT): home with assist

## 2025-05-28 NOTE — PLAN OF CARE
Problem: Adult Inpatient Plan of Care  Goal: Plan of Care Review  Outcome: Progressing  Flowsheets (Taken 5/28/2025 0409)  Progress: no change  Outcome Evaluation: Pt is A&Ox4 can be forgetful at times. Up with standby assist. PRN pain meds given with relief noted. educated on importance of keeping neck brace on. VSS. Safety maintained.  Plan of Care Reviewed With: patient

## 2025-05-29 ENCOUNTER — TELEPHONE (OUTPATIENT)
Dept: FAMILY MEDICINE CLINIC | Facility: CLINIC | Age: 70
End: 2025-05-29
Payer: MEDICARE

## 2025-05-29 ENCOUNTER — TRANSITIONAL CARE MANAGEMENT TELEPHONE ENCOUNTER (OUTPATIENT)
Dept: CALL CENTER | Facility: HOSPITAL | Age: 70
End: 2025-05-29
Payer: MEDICARE

## 2025-05-29 NOTE — TELEPHONE ENCOUNTER
Caller: Teo Jones    Relationship: Self    Best call back number: [phone]     What is the best time to reach you: ANY    Who are you requesting to speak with (clinical staff, provider,  specific staff member): CLINICAL    Do you know the name of the person who called: SELF    What was the call regarding: PT HAD SURGERY AT  ON MONDAY AND THE SURGEON TOLD HIM THAT HE WOULD BE SENT HOME WITH PAIN MEDICATION AND ANTIBIOTICS. THE PAIN MEDICATION WAS SENT OVER TO THE PHARMACY BUT THE ANTIBIOTIC WAS NOT. HE WAS GIVEN ANTIBIOTICS VIA IV WHILE AT THE HOSPITAL. HE CALLED THE HOSPITAL TO ASK ABOUT THE ANTIBIOTICS AND THEY TOLD HIM TO CALL HIS PCP TO TRY TO GET THE SITUATION RESOLVED. PLEASE CALL TO ADVISE.     Is it okay if the provider responds through MyChart: CALL BACK

## 2025-05-29 NOTE — PAYOR COMM NOTE
"REF:   [id number]     [facility]  FAX  [phone]     Dorota Araiza W (69 y.o. Male)       Date of Birth   1955    Social Security Number       Address   [address] [address]    Home Phone   [phone]    MRN   [id number]       Faith   [facility]    Marital Status                               Admission Date   5/26/2025    Admission Type   Emergency    Admitting Provider   Raghu Barrett MD    Attending Provider       Department, Room/Bed   [facility] 3A, 354/1       Discharge Date   5/28/2025    Discharge Disposition   Home or Self Care    Discharge Destination                                 Attending Provider: (none)   Allergies: No Known Allergies    Isolation: None   Infection: None   Code Status: Prior    Ht: 195.6 cm (77\")   Wt: 130 kg (286 lb 13.1 oz)    Admission Cmt: None   Principal Problem: Closed fracture of odontoid process of axis [S12.100A]                   Active Insurance as of 5/26/2025       Primary Coverage       Payor Plan Insurance Group Employer/Plan Group    AETNA MEDICARE REPLACEMENT AETNA MEDICARE ADVANTAGE SNP [id number]       Payor Plan Address Payor Plan Phone Number Payor Plan Fax Number Effective Dates    [address] [phone]  3/1/2025 - None Entered    [facility] [id number]         Subscriber Name Subscriber Birth Date Member ID       DOROTA ARAIZA W 1955 [id number]               Secondary Coverage       Payor Plan Insurance Group Employer/Plan Group    [employer] [place] [employer] MEDICAID        Payor Plan Address Payor Plan Phone Number Payor Plan Fax Number Effective Dates    [address] [address] [phone]  6/16/2020 - None Entered    [facility] [id number]         Subscriber Name Subscriber Birth Date Member ID       DOROTA ARAIZA W 1955 [id number]                     Emergency Contacts        (Rel.) Home Phone Work Phone Mobile Phone    JAYESH MCCLURE (Daughter) [phone] -- --      "            Discharge Summary        Harley Curtis APRN at 05/28/25 0096       Attestation signed by Raghu Barrett MD at 05/28/25 4425      I have reviewed this documentation and agree.                        Date of Discharge:  5/28/2025    Discharge Diagnosis: Odontoid fracture [S12.110A]    Presenting Problem/History of Present Illness  Odontoid fracture [S12.110A]       Hospital Course  Patient is a 69 y.o. male presented with Odontoid fracture [S12.110A].  The patient sustained a fall on May 25, 2025 hitting his head.  He had an onset of neck pain.  He ended up coming to the hospital on May 26 due to ongoing neck pain he was found that he did have an odontoid fracture as well as a C1 fracture.  The patient went to the operating room on 5/26/2025 for a Cervical Discectomy Posterior Fusion With O-arm C1-3.  The patient tolerated procedure well.  Currently the patient is ambulating.  The patient is tolerating p.o.  The patient is voiding spontaneously.  It is felt that at this time the patient is stable and suitable to be discharged home.  See orders for discharge instructions and restrictions.  The patient is to clean the incision daily with soap and water and keep the incision covered with a clean dressing while the collar is in place..  They are to call the office with any drainage from the incision or worsening pain.  I will follow-up with him in the office in 3 weeks.  He is not to take any NSAIDs.    Procedures Performed  Procedure(s):  CERVICAL DISCECTOMY POSTERIOR FUSION WITH O-ARM C1-3       Consults:   Consults       No orders found from 4/27/2025 to 5/27/2025.              Condition on Discharge: Stable    Vital Signs  Temp:  [97.8 °F (36.6 °C)-98.7 °F (37.1 °C)] 98.3 °F (36.8 °C)  Heart Rate:  [73-82] 75  Resp:  [18-20] 18  BP: (131-151)/(61-97) 151/61    Physical Exam:   Physical Exam  Constitutional:       General: He is awake.      Appearance: He is well-developed.   HENT:      Head:  Normocephalic.   Eyes:      Extraocular Movements: Extraocular movements intact.      Pupils: Pupils are equal, round, and reactive to light.   Pulmonary:      Effort: Pulmonary effort is normal.   Musculoskeletal:         General: Normal range of motion.      Cervical back: Normal range of motion.   Skin:     General: Skin is warm.      Comments: Incision clean dry and   Neurological:      Mental Status: He is alert and oriented to person, place, and time.      GCS: GCS eye subscore is 4. GCS verbal subscore is 5. GCS motor subscore is 6.      Cranial Nerves: No cranial nerve deficit.      Sensory: No sensory deficit.      Motor: Motor strength is normal.     Gait: Gait normal.      Deep Tendon Reflexes: Reflexes are normal and symmetric.   Psychiatric:         Speech: Speech normal.         Behavior: Behavior normal.         Thought Content: Thought content normal.          Neurological Exam  Mental Status  Awake and alert. Oriented to person, place and time. Oriented to person, place, and time. Speech is normal. Language is fluent with no aphasia. Attention and concentration are normal.    Cranial Nerves  CN I: Sense of smell is normal.  CN II: Right normal visual field. Left normal visual field.  CN III, IV, VI: Extraocular movements intact bilaterally. Pupils equal round and reactive to light bilaterally.  CN V: Facial sensation is normal.  CN VII:  Right: There is no facial weakness.  Left: There is no facial weakness.  CN XI: Shoulder shrug strength is normal.  CN XII: Tongue midline without atrophy or fasciculations.    Motor  Normal muscle bulk throughout. Normal muscle tone. Strength is 5/5 throughout all four extremities.    Sensory  Sensation is intact to light touch, pinprick, vibration and proprioception in all four extremities.    Reflexes  Deep tendon reflexes are 2+ and symmetric in all four extremities.    Gait  Normal casual, toe, heel and tandem gait. Normal gait.         Discharge  Disposition  Home or Self Care    Discharge Medications     Discharge Medications        New Medications        Instructions Start Date   cyclobenzaprine 10 MG tablet  Commonly known as: FLEXERIL   10 mg, Oral, 3 Times Daily PRN      naloxone 4 MG/0.1ML nasal spray  Commonly known as: NARCAN   1 spray, Nasal, As Needed      oxyCODONE-acetaminophen 7.5-325 MG per tablet  Commonly known as: PERCOCET   1 tablet, Oral, Every 6 Hours PRN             Continue These Medications        Instructions Start Date   allopurinol 300 MG tablet  Commonly known as: ZYLOPRIM   300 mg, Nightly      amLODIPine 5 MG tablet  Commonly known as: NORVASC   5 mg, Oral, Daily      aspirin 81 MG EC tablet   81 mg, Oral, Daily      atorvastatin 20 MG tablet  Commonly known as: LIPITOR   20 mg, Oral, Nightly      eszopiclone 3 MG tablet  Commonly known as: Lunesta   3 mg, Oral, Nightly, Take immediately before bedtime      famotidine 40 MG tablet  Commonly known as: PEPCID   40 mg, Oral, Nightly      lisinopril 40 MG tablet  Commonly known as: PRINIVIL,ZESTRIL   40 mg, Oral, Daily      metoprolol succinate  MG 24 hr tablet  Commonly known as: TOPROL-XL   1 tablet, Daily      omeprazole 40 MG capsule  Commonly known as: priLOSEC   40 mg, Every Morning               Discharge Diet:   Diet Instructions       Diet: Regular/House Diet; Regular Texture (IDDSI 7); Thin (IDDSI 0)      Discharge Diet: Regular/House Diet    Texture: Regular Texture (IDDSI 7)    Fluid Consistency: Thin (IDDSI 0)            Activity at Discharge:   Activity Instructions       Other Instructions (Specify)      Activity Instructions: No strenuous activity, no driving, wear cervical collar at all times.  No NSAIDs            Follow-up Appointments  Future Appointments   Date Time Provider Department Center   10/3/2025  9:00 AM Valdo Manrique APRN [facility] CD PAD PAD   10/14/2025  1:45 PM Maurice Caraballo MD [facility]  PADS PAD   11/26/2025  2:15 PM Ihsna Zuniga MD [facility] CD PAD  PAD   1/8/2026 12:30 PM PAD CT 1 BH PAD CAT PAD   1/8/2026  1:00 PM PAD US NIVAS CART 3 BH PAD NIVAS PAD   1/8/2026  2:00 PM PAD US NIVAS CART 3 BH PAD NIVAS PAD   1/15/2026 10:30 AM Marlene Shankar APRN [facility] VS PAD PAD     Additional Instructions for the Follow-ups that You Need to Schedule       Call MD With Problems / Concerns   As directed      Instructions: Worsening neck or arm pain, drainage from wound, fever, difficulty breathing or swallowing.    Order Comments: Instructions: Worsening neck or arm pain, drainage from wound, fever, difficulty breathing or swallowing.         Discharge Follow-up with Specialty: mela gillespie np; 3 Weeks   As directed      Specialty: mela gillespie np   Follow Up: 3 Weeks                Test Results Pending at Discharge       ALYSSA Lisa  05/28/25  08:27 CDT    Time: Discharge 20 min      Electronically signed by Raghu Barrett MD at 05/28/25 1353       Discharge Order (From admission, onward)       Start     Ordered    05/28/25 0824  Discharge patient  Once        Expected Discharge Date: 05/28/25   Discharge Disposition: Home or Self Care   Physician of Record for Attribution - Please select from Treatment Team: RAGHU BARRETT [[id number]]   Review needed by CMO to determine Physician of Record: No      Question Answer Comment   Physician of Record for Attribution - Please select from Treatment Team RAGHU BARRETT    Review needed by CMO to determine Physician of Record No        05/28/25 0821

## 2025-05-29 NOTE — OUTREACH NOTE
Call Center TCM Note      Flowsheet Row Responses   [facility] patient discharged from? [place]   Does the patient have one of the following disease processes/diagnoses(primary or secondary)? General Surgery   TCM attempt successful? Yes   Call start time 1444   Call end time 1449   Discharge diagnosis Closed fracture of odontoid process of axis, CERVICAL DISCECTOMY POSTERIOR FUSION WITH O-ARM C1-3   Meds reviewed with patient/caregiver? Yes   Is the patient having any side effects they believe may be caused by any medication additions or changes? No   Does the patient have all medications related to this admission filled (includes all antibiotics, pain medications, etc.) Yes   Is the patient taking all medications as directed (includes completed medication regime)? Yes   Comments Surgeon appt on 6/19   Does the patient have an appointment with their PCP within 7-14 days of discharge? No   Nursing Interventions Patient desires to follow up with specialty only   Psychosocial issues? No   Did the patient receive a copy of their discharge instructions? Yes   Is the patient /caregiver able to teach back basic post-op care? Practice 'cough and deep breath', Continue use of incentive spirometry at least 1 week post discharge, Drive as instructed by MD in discharge instructions, Keep incision areas clean,dry and protected, Lifting as instructed by MD in discharge instructions   Is the patient/caregiver able to teach back signs and symptoms of incisional infection? Increased redness, swelling or pain at the incisonal site, Increased drainage or bleeding, Incisional warmth, Pus or odor from incision, Fever   Is the patient/caregiver able to teach back steps to recovery at home? Set small, achievable goals for return to baseline health, Rest and rebuild strength, gradually increase activity, Eat a well-balance diet   If the patient is a current smoker, are they able to teach back resources for cessation? Not a smoker  "  Is the patient/caregiver able to teach back the hierarchy of who to call/visit for symptoms/problems? PCP, Specialist, Home health nurse, Urgent Care, ED, 911 Yes   Additional teach back comments States he is doing \"ok\".  Has collar on.  Wife has checked site and is healing well.  He has been using his nubulizer.   TCM call completed? Yes   Wrap up additional comments No questions or needs at this time.   Call end time [phone]            Viki BROUSSARD - Licensed Nurse    5/29/2025, 14:49 CDT        "

## 2025-05-29 NOTE — PAYOR COMM NOTE
"REF:  [id number]     [facility]  FAX  [phone]      Dorota Araiza W (69 y.o. Male)       Date of Birth   1955    Social Security Number       Address   [address] [address]    Home Phone   [phone]    MRN   [id number]       Hinduism   [facility]    Marital Status                               Admission Date   5/26/2025    Admission Type   Emergency    Admitting Provider   Raghu Barrett MD    Attending Provider       Department, Room/Bed   [facility] 3A, 354/1       Discharge Date   5/28/2025    Discharge Disposition   Home or Self Care    Discharge Destination                                 Attending Provider: (none)   Allergies: No Known Allergies    Isolation: None   Infection: None   Code Status: Prior    Ht: 195.6 cm (77\")   Wt: 130 kg (286 lb 13.1 oz)    Admission Cmt: None   Principal Problem: Closed fracture of odontoid process of axis [S12.100A]                   Active Insurance as of 5/26/2025       Primary Coverage       Payor Plan Insurance Group Employer/Plan Group    AETNA MEDICARE REPLACEMENT AETNA MEDICARE ADVANTAGE SNP [id number]       Payor Plan Address Payor Plan Phone Number Payor Plan Fax Number Effective Dates    [address] [phone]  3/1/2025 - None Entered    [facility] [id number]         Subscriber Name Subscriber Birth Date Member ID       DOROTA ARAIZA W 1955 [id number]               Secondary Coverage       Payor Plan Insurance Group Employer/Plan Group    [facility] MEDICAID        Payor Plan Address Payor Plan Phone Number Payor Plan Fax Number Effective Dates    [address] [address] [phone]  6/16/2020 - None Entered    [facility] [address]         Subscriber Name Subscriber Birth Date Member ID       DOROTA ARAIZA W 1955 [id number]                     Emergency Contacts        (Rel.) Home Phone Work Phone Mobile Phone    JAYESH MCCLURE (Daughter) [phone] -- --      "            Discharge Summary        Harley Curtis APRN at 05/28/25 8988       Attestation signed by Raghu Barrett MD at 05/28/25 8936      I have reviewed this documentation and agree.                        Date of Discharge:  5/28/2025    Discharge Diagnosis: Odontoid fracture [S12.110A]    Presenting Problem/History of Present Illness  Odontoid fracture [S12.110A]       Hospital Course  Patient is a 69 y.o. male presented with Odontoid fracture [S12.110A].  The patient sustained a fall on May 25, 2025 hitting his head.  He had an onset of neck pain.  He ended up coming to the hospital on May 26 due to ongoing neck pain he was found that he did have an odontoid fracture as well as a C1 fracture.  The patient went to the operating room on 5/26/2025 for a Cervical Discectomy Posterior Fusion With O-arm C1-3.  The patient tolerated procedure well.  Currently the patient is ambulating.  The patient is tolerating p.o.  The patient is voiding spontaneously.  It is felt that at this time the patient is stable and suitable to be discharged home.  See orders for discharge instructions and restrictions.  The patient is to clean the incision daily with soap and water and keep the incision covered with a clean dressing while the collar is in place..  They are to call the office with any drainage from the incision or worsening pain.  I will follow-up with him in the office in 3 weeks.  He is not to take any NSAIDs.    Procedures Performed  Procedure(s):  CERVICAL DISCECTOMY POSTERIOR FUSION WITH O-ARM C1-3       Consults:   Consults       No orders found from 4/27/2025 to 5/27/2025.              Condition on Discharge: Stable    Vital Signs  Temp:  [97.8 °F (36.6 °C)-98.7 °F (37.1 °C)] 98.3 °F (36.8 °C)  Heart Rate:  [73-82] 75  Resp:  [18-20] 18  BP: (131-151)/(61-97) 151/61    Physical Exam:   Physical Exam  Constitutional:       General: He is awake.      Appearance: He is well-developed.   HENT:      Head:  Normocephalic.   Eyes:      Extraocular Movements: Extraocular movements intact.      Pupils: Pupils are equal, round, and reactive to light.   Pulmonary:      Effort: Pulmonary effort is normal.   Musculoskeletal:         General: Normal range of motion.      Cervical back: Normal range of motion.   Skin:     General: Skin is warm.      Comments: Incision clean dry and   Neurological:      Mental Status: He is alert and oriented to person, place, and time.      GCS: GCS eye subscore is 4. GCS verbal subscore is 5. GCS motor subscore is 6.      Cranial Nerves: No cranial nerve deficit.      Sensory: No sensory deficit.      Motor: Motor strength is normal.     Gait: Gait normal.      Deep Tendon Reflexes: Reflexes are normal and symmetric.   Psychiatric:         Speech: Speech normal.         Behavior: Behavior normal.         Thought Content: Thought content normal.          Neurological Exam  Mental Status  Awake and alert. Oriented to person, place and time. Oriented to person, place, and time. Speech is normal. Language is fluent with no aphasia. Attention and concentration are normal.    Cranial Nerves  CN I: Sense of smell is normal.  CN II: Right normal visual field. Left normal visual field.  CN III, IV, VI: Extraocular movements intact bilaterally. Pupils equal round and reactive to light bilaterally.  CN V: Facial sensation is normal.  CN VII:  Right: There is no facial weakness.  Left: There is no facial weakness.  CN XI: Shoulder shrug strength is normal.  CN XII: Tongue midline without atrophy or fasciculations.    Motor  Normal muscle bulk throughout. Normal muscle tone. Strength is 5/5 throughout all four extremities.    Sensory  Sensation is intact to light touch, pinprick, vibration and proprioception in all four extremities.    Reflexes  Deep tendon reflexes are 2+ and symmetric in all four extremities.    Gait  Normal casual, toe, heel and tandem gait. Normal gait.         Discharge  Disposition  Home or Self Care    Discharge Medications     Discharge Medications        New Medications        Instructions Start Date   cyclobenzaprine 10 MG tablet  Commonly known as: FLEXERIL   10 mg, Oral, 3 Times Daily PRN      naloxone 4 MG/0.1ML nasal spray  Commonly known as: NARCAN   1 spray, Nasal, As Needed      oxyCODONE-acetaminophen 7.5-325 MG per tablet  Commonly known as: PERCOCET   1 tablet, Oral, Every 6 Hours PRN             Continue These Medications        Instructions Start Date   allopurinol 300 MG tablet  Commonly known as: ZYLOPRIM   300 mg, Nightly      amLODIPine 5 MG tablet  Commonly known as: NORVASC   5 mg, Oral, Daily      aspirin 81 MG EC tablet   81 mg, Oral, Daily      atorvastatin 20 MG tablet  Commonly known as: LIPITOR   20 mg, Oral, Nightly      eszopiclone 3 MG tablet  Commonly known as: Lunesta   3 mg, Oral, Nightly, Take immediately before bedtime      famotidine 40 MG tablet  Commonly known as: PEPCID   40 mg, Oral, Nightly      lisinopril 40 MG tablet  Commonly known as: PRINIVIL,ZESTRIL   40 mg, Oral, Daily      metoprolol succinate  MG 24 hr tablet  Commonly known as: TOPROL-XL   1 tablet, Daily      omeprazole 40 MG capsule  Commonly known as: priLOSEC   40 mg, Every Morning               Discharge Diet:   Diet Instructions       Diet: Regular/House Diet; Regular Texture (IDDSI 7); Thin (IDDSI 0)      Discharge Diet: Regular/House Diet    Texture: Regular Texture (IDDSI 7)    Fluid Consistency: Thin (IDDSI 0)            Activity at Discharge:   Activity Instructions       Other Instructions (Specify)      Activity Instructions: No strenuous activity, no driving, wear cervical collar at all times.  No NSAIDs            Follow-up Appointments  Future Appointments   Date Time Provider Department Center   10/3/2025  9:00 AM Valdo Manrique APRN [facility] CD PAD PAD   10/14/2025  1:45 PM Maurice Caraballo MD [facility]  PADS PAD   11/26/2025  2:15 PM Ihsan Zuniga MD [facility] CD PAD  PAD   1/8/2026 12:30 PM PAD CT 1 BH PAD CAT PAD   1/8/2026  1:00 PM PAD US NIVAS CART 3 BH PAD NIVAS PAD   1/8/2026  2:00 PM PAD US NIVAS CART 3 BH PAD NIVAS PAD   1/15/2026 10:30 AM Marlene Shankar APRN [facility] VS PAD PAD     Additional Instructions for the Follow-ups that You Need to Schedule       Call MD With Problems / Concerns   As directed      Instructions: Worsening neck or arm pain, drainage from wound, fever, difficulty breathing or swallowing.    Order Comments: Instructions: Worsening neck or arm pain, drainage from wound, fever, difficulty breathing or swallowing.         Discharge Follow-up with Specialty: mela gillespie np; 3 Weeks   As directed      Specialty: mela gillespie np   Follow Up: 3 Weeks                Test Results Pending at Discharge       ALYSSA Lisa  05/28/25  08:27 CDT    Time: Discharge 20 min      Electronically signed by Raghu Barrett MD at 05/28/25 1353       Discharge Order (From admission, onward)       Start     Ordered    05/28/25 0824  Discharge patient  Once        Expected Discharge Date: 05/28/25   Discharge Disposition: Home or Self Care   Physician of Record for Attribution - Please select from Treatment Team: RAGHU BARRETT [[id number]]   Review needed by CMO to determine Physician of Record: No      Question Answer Comment   Physician of Record for Attribution - Please select from Treatment Team RAGHU BARRETT    Review needed by CMO to determine Physician of Record No        05/28/25 0835

## 2025-05-29 NOTE — OUTREACH NOTE
Call Center TCM Note      Flowsheet Row Responses   [facility] patient discharged from? [place]   Does the patient have one of the following disease processes/diagnoses(primary or secondary)? General Surgery   TCM attempt successful? No   Unsuccessful attempts Attempt 1            Viki BROUSSARD - Licensed Nurse    5/29/2025, 11:16 CDT

## 2025-06-03 LAB
QT INTERVAL: 420 MS
QTC INTERVAL: 456 MS

## 2025-06-03 RX ORDER — CLINDAMYCIN HYDROCHLORIDE 300 MG/1
300 CAPSULE ORAL 4 TIMES DAILY
Qty: 28 CAPSULE | Refills: 0 | Status: SHIPPED | OUTPATIENT
Start: 2025-06-03 | End: 2025-06-10

## 2025-06-06 RX ORDER — OMEPRAZOLE 40 MG/1
40 CAPSULE, DELAYED RELEASE ORAL EVERY MORNING
Qty: 90 CAPSULE | Refills: 3 | Status: SHIPPED | OUTPATIENT
Start: 2025-06-06

## 2025-06-06 NOTE — TELEPHONE ENCOUNTER
Caller: Karen Teo SHER    Relationship: Self    Best call back number: [phone]     Requested Prescriptions:   Requested Prescriptions     Pending Prescriptions Disp Refills    omeprazole (priLOSEC) 40 MG capsule       Sig: Take 1 capsule by mouth Every Morning.        [address] [facility] sent[address] [facility] [address] - [phone]  - [phone] FX     Last office visit with prescribing clinician: 4/1/2025   Last telemedicine visit with prescribing clinician: Visit date not found   Next office visit with prescribing clinician: 10/14/2025     Additional details provided by patient:     Does the patient have less than a 3 day supply:  [x] Yes  [] No    Would you like a call back once the refill request has been completed: [x] Yes [] No    If the office needs to give you a call back, can they leave a voicemail: [x] Yes [] No    Juana Beauchamp   06/06/25 13:07 CDT

## 2025-06-13 ENCOUNTER — HOSPITAL ENCOUNTER (EMERGENCY)
Facility: HOSPITAL | Age: 70
Discharge: HOME | End: 2025-06-13
Attending: EMERGENCY MEDICINE
Payer: MEDICARE

## 2025-06-13 ENCOUNTER — APPOINTMENT (OUTPATIENT)
Dept: CT IMAGING | Facility: HOSPITAL | Age: 70
End: 2025-06-13
Payer: MEDICARE

## 2025-06-13 VITALS
DIASTOLIC BLOOD PRESSURE: 99 MMHG | HEIGHT: 77 IN | SYSTOLIC BLOOD PRESSURE: 132 MMHG | BODY MASS INDEX: 25.03 KG/M2 | HEART RATE: 93 BPM | WEIGHT: 212 LBS | OXYGEN SATURATION: 94 % | RESPIRATION RATE: 20 BRPM | TEMPERATURE: 98 F

## 2025-06-13 DIAGNOSIS — S12.001A CLOSED NONDISPLACED FRACTURE OF FIRST CERVICAL VERTEBRA, UNSPECIFIED FRACTURE MORPHOLOGY, INITIAL ENCOUNTER: Primary | ICD-10-CM

## 2025-06-13 DIAGNOSIS — R55 SYNCOPE AND COLLAPSE: ICD-10-CM

## 2025-06-13 LAB
ALBUMIN SERPL-MCNC: 4 G/DL (ref 3.5–5.2)
ALBUMIN/GLOB SERPL: 1.1 G/DL
ALP SERPL-CCNC: 89 U/L (ref 39–117)
ALT SERPL W P-5'-P-CCNC: 19 U/L (ref 1–41)
ANION GAP SERPL CALCULATED.3IONS-SCNC: 16 MMOL/L (ref 5–15)
AST SERPL-CCNC: 29 U/L (ref 1–40)
BASOPHILS # BLD AUTO: 0.08 10*3/MM3 (ref 0–0.2)
BASOPHILS NFR BLD AUTO: 1 % (ref 0–1.5)
BILIRUB SERPL-MCNC: 0.3 MG/DL (ref 0–1.2)
BUN SERPL-MCNC: 13.2 MG/DL (ref 8–23)
BUN/CREAT SERPL: 17.8 (ref 7–25)
CALCIUM SPEC-SCNC: 9.8 MG/DL (ref 8.6–10.5)
CHLORIDE SERPL-SCNC: 102 MMOL/L (ref 98–107)
CO2 SERPL-SCNC: 18 MMOL/L (ref 22–29)
CREAT SERPL-MCNC: 0.74 MG/DL (ref 0.76–1.27)
DEPRECATED RDW RBC AUTO: 46.1 FL (ref 37–54)
EGFRCR SERPLBLD CKD-EPI 2021: 98.7 ML/MIN/1.73
EOSINOPHIL # BLD AUTO: 0.28 10*3/MM3 (ref 0–0.4)
EOSINOPHIL NFR BLD AUTO: 3.4 % (ref 0.3–6.2)
ERYTHROCYTE [DISTWIDTH] IN BLOOD BY AUTOMATED COUNT: 12.8 % (ref 12.3–15.4)
GLOBULIN UR ELPH-MCNC: 3.5 GM/DL
GLUCOSE SERPL-MCNC: 185 MG/DL (ref 65–99)
HCT VFR BLD AUTO: 44.5 % (ref 37.5–51)
HGB BLD-MCNC: 15.1 G/DL (ref 13–17.7)
IMM GRANULOCYTES # BLD AUTO: 0.03 10*3/MM3 (ref 0–0.05)
IMM GRANULOCYTES NFR BLD AUTO: 0.4 % (ref 0–0.5)
LYMPHOCYTES # BLD AUTO: 2.6 10*3/MM3 (ref 0.7–3.1)
LYMPHOCYTES NFR BLD AUTO: 31.3 % (ref 19.6–45.3)
MAGNESIUM SERPL-MCNC: 2.3 MG/DL (ref 1.6–2.4)
MCH RBC QN AUTO: 33.5 PG (ref 26.6–33)
MCHC RBC AUTO-ENTMCNC: 33.9 G/DL (ref 31.5–35.7)
MCV RBC AUTO: 98.7 FL (ref 79–97)
MONOCYTES # BLD AUTO: 1.19 10*3/MM3 (ref 0.1–0.9)
MONOCYTES NFR BLD AUTO: 14.3 % (ref 5–12)
NEUTROPHILS NFR BLD AUTO: 4.12 10*3/MM3 (ref 1.7–7)
NEUTROPHILS NFR BLD AUTO: 49.6 % (ref 42.7–76)
NRBC BLD AUTO-RTO: 0 /100 WBC (ref 0–0.2)
PLATELET # BLD AUTO: 419 10*3/MM3 (ref 140–450)
PMV BLD AUTO: 9.6 FL (ref 6–12)
POTASSIUM SERPL-SCNC: 4.2 MMOL/L (ref 3.5–5.2)
PROT SERPL-MCNC: 7.5 G/DL (ref 6–8.5)
RBC # BLD AUTO: 4.51 10*6/MM3 (ref 4.14–5.8)
SODIUM SERPL-SCNC: 136 MMOL/L (ref 136–145)
WBC NRBC COR # BLD AUTO: 8.3 10*3/MM3 (ref 3.4–10.8)

## 2025-06-13 PROCEDURE — 80053 COMPREHEN METABOLIC PANEL: CPT | Performed by: EMERGENCY MEDICINE

## 2025-06-13 PROCEDURE — 25510000001 IOPAMIDOL PER 1 ML: Performed by: EMERGENCY MEDICINE

## 2025-06-13 PROCEDURE — 85025 COMPLETE CBC W/AUTO DIFF WBC: CPT | Performed by: EMERGENCY MEDICINE

## 2025-06-13 PROCEDURE — 99285 EMERGENCY DEPT VISIT HI MDM: CPT | Performed by: EMERGENCY MEDICINE

## 2025-06-13 PROCEDURE — 83735 ASSAY OF MAGNESIUM: CPT | Performed by: EMERGENCY MEDICINE

## 2025-06-13 PROCEDURE — 71275 CT ANGIOGRAPHY CHEST: CPT

## 2025-06-13 RX ORDER — IOPAMIDOL 755 MG/ML
100 INJECTION, SOLUTION INTRAVASCULAR
Status: COMPLETED | OUTPATIENT
Start: 2025-06-13 | End: 2025-06-13

## 2025-06-13 RX ORDER — OXYCODONE AND ACETAMINOPHEN 7.5; 325 MG/1; MG/1
1 TABLET ORAL EVERY 4 HOURS PRN
Qty: 10 TABLET | Refills: 0 | Status: SHIPPED | OUTPATIENT
Start: 2025-06-13

## 2025-06-13 RX ORDER — OXYCODONE AND ACETAMINOPHEN 7.5; 325 MG/1; MG/1
1 TABLET ORAL ONCE
Refills: 0 | Status: COMPLETED | OUTPATIENT
Start: 2025-06-13 | End: 2025-06-13

## 2025-06-13 RX ORDER — SODIUM CHLORIDE 0.9 % (FLUSH) 0.9 %
10 SYRINGE (ML) INJECTION AS NEEDED
Status: DISCONTINUED | OUTPATIENT
Start: 2025-06-13 | End: 2025-06-13 | Stop reason: HOSPADM

## 2025-06-13 RX ADMIN — OXYCODONE HYDROCHLORIDE AND ACETAMINOPHEN 1 TABLET: 7.5; 325 TABLET ORAL at 17:15

## 2025-06-13 RX ADMIN — IOPAMIDOL 100 ML: 755 INJECTION, SOLUTION INTRAVENOUS at 16:12

## 2025-06-13 NOTE — ED PROVIDER NOTES
Subjective   History of Present Illness  Patient complains of multiple episodes of passing out.  He says that he fell a couple weeks ago when he passed out and injured his neck and had to have surgery for that.  Prior to that time and only had 1 or 2 these episodes in the past month.  Since that time though it says he seems to happen almost every day.  When he gets up in the morning and stands up to go eat when he takes a few steps he passes out.  He has happened twice today.  Sometimes he feels it coming on he will grab a hold of something and wait let passes but other times he has no warning.  He checked his blood pressure 1 time when he felt bad and it was 60/40 but he does not know specific on the times when he passes out.  He does sometimes get sweaty and nauseated also.  He denies any chest pain or palpitations when these episodes happen.  He has had some chest pain but has a history of heart disease also.    History provided by:  Patient   used: No    Syncope  Episode history:  Multiple  Most recent episode:  More than 2 days ago  Duration:  1 minute  Timing:  Intermittent  Progression:  Waxing and waning  Chronicity:  New  Context: standing up    Context: not blood draw, not bowel movement, not dehydration, not exertion, not inactivity, not medication change, not with normal activity, not sight of blood, not sitting down and not urination    Witnessed: no    Relieved by:  Nothing  Worsened by:  Nothing  Ineffective treatments:  None tried  Associated symptoms: recent injury    Associated symptoms: no anxiety, no chest pain, no confusion, no diaphoresis, no difficulty breathing, no dizziness, no fever, no focal sensory loss, no focal weakness, no headaches, no malaise/fatigue, no nausea, no palpitations, no recent fall, no recent surgery, no rectal bleeding, no seizures, no shortness of breath, no visual change, no vomiting and no weakness    Risk factors: no congenital heart disease, no  coronary artery disease, no seizures and no vascular disease        Review of Systems   Constitutional: Negative.  Negative for diaphoresis, fever and malaise/fatigue.   HENT: Negative.     Respiratory: Negative.  Negative for shortness of breath.    Cardiovascular:  Positive for syncope. Negative for chest pain and palpitations.   Gastrointestinal: Negative.  Negative for nausea and vomiting.   Genitourinary: Negative.    Musculoskeletal: Negative.    Skin: Negative.    Neurological:  Negative for dizziness, focal weakness, seizures, weakness and headaches.   Psychiatric/Behavioral: Negative.  Negative for confusion.    All other systems reviewed and are negative.      Past Medical History:   Diagnosis Date    Abnormal ECG     Aneurysm     Arrhythmia     Asthma     Atrial fibrillation     COPD (chronic obstructive pulmonary disease)     Elevated cholesterol     GERD (gastroesophageal reflux disease)     Hyperlipidemia     Hypertension     Type 2 diabetes mellitus 12/09/2021       No Known Allergies    Past Surgical History:   Procedure Laterality Date    ABDOMINAL AORTIC ANEURYSM REPAIR WITH ENDOGRAFT N/A 11/10/2021    Procedure: ABDOMINAL AORTIC ANEURYSM REPAIR WITH ENDOGRAFT;  Surgeon: Khai Reynolds DO;  Location: [facility] HYBRID OR 12;  Service: Vascular;  Laterality: N/A;    AORTOGRAM N/A 12/15/2021    Procedure: AORTOILIAC ANGIOGRAM, BILATERAL ILIAC BALLOON ANGIOPLASTY, MYNX CLOSURE;  Surgeon: Khai Reynolds DO;  Location: [facility] HYBRID OR 12;  Service: Vascular;  Laterality: N/A;    APPENDECTOMY      ATRIAL APPENDAGE EXCLUSION LEFT WITH TRANSESOPHAGEAL ECHOCARDIOGRAM Right 5/9/2023    Procedure: Atrial Appendage Occlusion;  Surgeon: Ihsan Zuniga MD;  Location:  [facility] CATH INVASIVE LOCATION;  Service: Cardiology;  Laterality: Right;    CARDIAC ELECTROPHYSIOLOGY PROCEDURE N/A 12/4/2024    Procedure: Ablation atrial fibrillation - PFA;  Surgeon: Sea Howe MD;  Location: [facility] CATH INVASIVE  LOCATION;  Service: Cardiovascular;  Laterality: N/A;    CERVICAL DISCECTOMY POSTERIOR FUSION WITH INSTRUMENTATION N/A 5/26/2025    Procedure: CERVICAL DISCECTOMY POSTERIOR FUSION WITH O-ARM C1-3;  Surgeon: Raghu Barrett MD;  Location: [facility] OR;  Service: Neurosurgery;  Laterality: N/A;       Family History   Problem Relation Age of Onset    Heart attack Father     Colon polyps Neg Hx     Esophageal cancer Neg Hx     Colon cancer Neg Hx        Social History     Socioeconomic History    Marital status:    Tobacco Use    Smoking status: Every Day     Current packs/day: 1.00     Average packs/day: 1 pack/day for 54.4 years (54.4 ttl pk-yrs)     Types: Cigarettes     Start date: 1/1/1971    Smokeless tobacco: Never    Tobacco comments:     SMOKES 6-8 CIG/DAY,  ROLLS HIS OWN   Vaping Use    Vaping status: Never Used   Substance and Sexual Activity    Alcohol use: Yes     Comment: rarely    Drug use: Yes     Types: Marijuana    Sexual activity: Defer       Prior to Admission medications    Medication Sig Start Date End Date Taking? Authorizing Provider   allopurinol (ZYLOPRIM) 300 MG tablet Take 1 tablet by mouth Every Night.    ProviderEdward MD   amLODIPine (NORVASC) 5 MG tablet TAKE 1 TABLET BY MOUTH DAILY. 5/5/25   Maurice Caraballo MD   aspirin 81 MG EC tablet Take 1 tablet by mouth Daily. 4/2/25   Valdo Manrique APRN   atorvastatin (LIPITOR) 20 MG tablet Take 1 tablet by mouth Every Night.    ProviderEdward MD   cyclobenzaprine (FLEXERIL) 10 MG tablet Take 1 tablet by mouth 3 (Three) Times a Day As Needed for Muscle Spasms. 5/28/25   Harley Curtis APRN   eszopiclone (Lunesta) 3 MG tablet Take 1 tablet by mouth Every Night. Take immediately before bedtime 2/25/25   Maurice Caraballo MD   famotidine (PEPCID) 40 MG tablet Take 1 tablet by mouth Every Night. 12/19/24   Maurice Caraballo MD   lisinopril (PRINIVIL,ZESTRIL) 40 MG tablet Take 1 tablet by mouth Daily. 4/1/25   Maurice Caraballo MD    metoprolol succinate XL (TOPROL-XL) 100 MG 24 hr tablet Take 1 tablet by mouth Daily. 11/2/21   Provider, MD Edward   naloxone (NARCAN) 4 MG/0.1ML nasal spray Administer 1 spray into the nostril(s) as directed by provider As Needed for Opioid Reversal. 5/28/25   Harley Curtis APRN   omeprazole (priLOSEC) 40 MG capsule Take 1 capsule by mouth Every Morning. Patient takes nightly 6/6/25   Maurice Caraballo MD   oxyCODONE-acetaminophen (Percocet) 7.5-325 MG per tablet Take 1 tablet by mouth Every 6 (Six) Hours As Needed for Moderate Pain. 5/28/25   Raghu Barrett MD       Medications   iopamidol (ISOVUE-370) 76 % injection 100 mL (100 mL Intravenous Given 6/13/25 1612)   oxyCODONE-acetaminophen (PERCOCET) 7.5-325 MG per tablet 1 tablet (1 tablet Oral Given 6/13/25 1715)       Vitals:    06/13/25 1720   BP:    Pulse:    Resp: 20   Temp: 98 °F (36.7 °C)   SpO2:          Objective   Physical Exam  Vitals and nursing note reviewed.   Constitutional:       Appearance: Normal appearance.   HENT:      Head: Normocephalic and atraumatic.   Eyes:      Extraocular Movements: Extraocular movements intact.      Pupils: Pupils are equal, round, and reactive to light.   Neck:      Comments: Neck is in a collar and kept there.  Cardiovascular:      Rate and Rhythm: Normal rate and regular rhythm.   Pulmonary:      Effort: Pulmonary effort is normal.      Breath sounds: Normal breath sounds.   Abdominal:      General: Abdomen is flat.      Palpations: Abdomen is soft.   Musculoskeletal:         General: Normal range of motion.   Skin:     General: Skin is warm and dry.   Neurological:      General: No focal deficit present.      Mental Status: He is alert and oriented to person, place, and time.   Psychiatric:         Mood and Affect: Mood normal.         Behavior: Behavior normal.         Procedures         Lab Results (last 24 hours)       Procedure Component Value Units Date/Time    CBC & Differential [[id number]]   (Abnormal) Collected: 06/13/25 1549    Specimen: Blood Updated: 06/13/25 1602    Narrative:      The following orders were created for panel order CBC & Differential.  Procedure                               Abnormality         Status                     ---------                               -----------         ------                     CBC Auto Differential[[id number]]        Abnormal            Final result                 Please view results for these tests on the individual orders.    Comprehensive Metabolic Panel [[id number]]  (Abnormal) Collected: 06/13/25 1549    Specimen: Blood Updated: 06/13/25 1620     Glucose 185 mg/dL      BUN 13.2 mg/dL      Creatinine 0.74 mg/dL      Sodium 136 mmol/L      Potassium 4.2 mmol/L      Chloride 102 mmol/L      CO2 18.0 mmol/L      Calcium 9.8 mg/dL      Total Protein 7.5 g/dL      Albumin 4.0 g/dL      ALT (SGPT) 19 U/L      AST (SGOT) 29 U/L      Alkaline Phosphatase 89 U/L      Total Bilirubin 0.3 mg/dL      Globulin 3.5 gm/dL      A/G Ratio 1.1 g/dL      BUN/Creatinine Ratio 17.8     Anion Gap 16.0 mmol/L      eGFR 98.7 mL/min/1.73     Narrative:      GFR Categories in Chronic Kidney Disease (CKD)              GFR Category          GFR (mL/min/1.73)    Interpretation  G1                    90 or greater        Normal or high (1)  G2                    60-89                Mild decrease (1)  G3a                   45-59                Mild to moderate decrease  G3b                   30-44                Moderate to severe decrease  G4                    15-29                Severe decrease  G5                    14 or less           Kidney failure    (1)In the absence of evidence of kidney disease, neither GFR category G1 or G2 fulfill the criteria for CKD.    eGFR calculation 2021 CKD-EPI creatinine equation, which does not include race as a factor    Magnesium [[id number]]  (Normal) Collected: 06/13/25 1549    Specimen: Blood Updated: 06/13/25 1617     Magnesium 2.3  mg/dL     CBC Auto Differential [[id number]]  (Abnormal) Collected: 06/13/25 1549    Specimen: Blood Updated: 06/13/25 1602     WBC 8.30 10*3/mm3      RBC 4.51 10*6/mm3      Hemoglobin 15.1 g/dL      Hematocrit 44.5 %      MCV 98.7 fL      MCH 33.5 pg      MCHC 33.9 g/dL      RDW 12.8 %      RDW-SD 46.1 fl      MPV 9.6 fL      Platelets 419 10*3/mm3      Neutrophil % 49.6 %      Lymphocyte % 31.3 %      Monocyte % 14.3 %      Eosinophil % 3.4 %      Basophil % 1.0 %      Immature Grans % 0.4 %      Neutrophils, Absolute 4.12 10*3/mm3      Lymphocytes, Absolute 2.60 10*3/mm3      Monocytes, Absolute 1.19 10*3/mm3      Eosinophils, Absolute 0.28 10*3/mm3      Basophils, Absolute 0.08 10*3/mm3      Immature Grans, Absolute 0.03 10*3/mm3      nRBC 0.0 /100 WBC             CT Angiogram Chest Pulmonary Embolism   Final Result       1. No pulmonary emboli.   2. Left atrial occlusion device. Mild atherosclerotic calcifications of   the thoracic aorta with no aneurysm. Moderate LAD coronary   calcification.   3. Emphysema. 9 mm irregular spiculated superior segment left lower lobe   pulmonary nodule with linear densities emanating from the nodule to the   posterior lateral pleural surface. No prior studies to assess chronicity   of the finding. Comparison to outside films recommended. If no outside   studies, a follow-up PET/CT versus short-term follow-up CT chest in 3   months recommended. 5 mm left upper lobe pulmonary nodule can be   followed on subsequent imaging. No pathologic lymphadenopathy.   4. Moderate size hiatal hernia.       This report was signed and finalized on 6/13/2025 4:29 PM by Dr. Rosangela Katz MD.              ED Course          MDM  Number of Diagnoses or Management Options  Closed nondisplaced fracture of first cervical vertebra, unspecified fracture morphology, initial encounter: new and requires workup  Syncope and collapse: new and requires workup  Diagnosis management comments: This testing all  looks fine and I do not have a good explanation for why he is having the syncopal episodes.  It sounds like postural hypotension or vasovagal episodes.  I offered admit him for further evaluation and observation but he decided he does not want to stay.  He did ask for something for continued pain in his neck and I did give him a few pain pills for that.  I did advise him to move slowly from this point and take his time when getting around so that he does not pass out.  He is discharged in stable condition.       Amount and/or Complexity of Data Reviewed  Clinical lab tests: ordered and reviewed  Tests in the radiology section of CPT®: ordered and reviewed  Tests in the medicine section of CPT®: reviewed and ordered    Risk of Complications, Morbidity, and/or Mortality  Presenting problems: moderate  Diagnostic procedures: moderate  Management options: moderate    Patient Progress  Patient progress: stable        Final diagnoses:   Closed nondisplaced fracture of first cervical vertebra, unspecified fracture morphology, initial encounter   Syncope and collapse          Raghu Clayton Jr., MD  06/14/25 1631

## 2025-06-18 ENCOUNTER — READMISSION MANAGEMENT (OUTPATIENT)
Dept: CALL CENTER | Facility: HOSPITAL | Age: 70
End: 2025-06-18
Payer: MEDICARE

## 2025-06-19 ENCOUNTER — HOSPITAL ENCOUNTER (OUTPATIENT)
Dept: GENERAL RADIOLOGY | Facility: HOSPITAL | Age: 70
Discharge: HOME | End: 2025-06-19
Admitting: NURSE PRACTITIONER
Payer: MEDICARE

## 2025-06-19 ENCOUNTER — RESULTS FOLLOW-UP (OUTPATIENT)
Dept: NEUROSURGERY | Facility: CLINIC | Age: 70
End: 2025-06-19
Payer: MEDICARE

## 2025-06-19 ENCOUNTER — OFFICE VISIT (OUTPATIENT)
Dept: NEUROSURGERY | Facility: CLINIC | Age: 70
End: 2025-06-19
Payer: MEDICARE

## 2025-06-19 VITALS — HEIGHT: 77 IN | WEIGHT: 212 LBS | BODY MASS INDEX: 25.03 KG/M2

## 2025-06-19 DIAGNOSIS — Z72.0 TOBACCO USE: ICD-10-CM

## 2025-06-19 DIAGNOSIS — S12.100D CLOSED ODONTOID FRACTURE WITH ROUTINE HEALING, SUBSEQUENT ENCOUNTER: Primary | ICD-10-CM

## 2025-06-19 DIAGNOSIS — S12.100D CLOSED ODONTOID FRACTURE WITH ROUTINE HEALING, SUBSEQUENT ENCOUNTER: ICD-10-CM

## 2025-06-19 DIAGNOSIS — E66.3 OVERWEIGHT WITH BODY MASS INDEX (BMI) OF 25 TO 25.9 IN ADULT: ICD-10-CM

## 2025-06-19 PROCEDURE — 72050 X-RAY EXAM NECK SPINE 4/5VWS: CPT

## 2025-06-19 PROCEDURE — 1159F MED LIST DOCD IN RCRD: CPT | Performed by: NURSE PRACTITIONER

## 2025-06-19 PROCEDURE — 99024 POSTOP FOLLOW-UP VISIT: CPT | Performed by: NURSE PRACTITIONER

## 2025-06-19 PROCEDURE — 1160F RVW MEDS BY RX/DR IN RCRD: CPT | Performed by: NURSE PRACTITIONER

## 2025-06-19 NOTE — TELEPHONE ENCOUNTER
Placed a call to inform pt of imaging results and pt can come out of brace on 0707/25. Ended call with pt understanding and acknowledgment.

## 2025-06-19 NOTE — PATIENT INSTRUCTIONS
Advance Care Planning and Advance Directives     You make decisions on a daily basis - decisions about where you want to live, your career, your home, your life. Perhaps one of the most important decisions you face is your choice for future medical care. Take time to talk with your family and your healthcare team and start planning today.  Advance Care Planning is a process that can help you:  Understand possible future healthcare decisions in light of your own experiences  Reflect on those decision in light of your goals and values  Discuss your decisions with those closest to you and the healthcare professionals that care for you  Make a plan by creating a document that reflects your wishes    Surrogate Decision Maker  In the event of a medical emergency, which has left you unable to communicate or to make your own decisions, you would need someone to make decisions for you.  It is important to discuss your preferences for medical treatment with this person while you are in good health.     Qualities of a surrogate decision maker:  Willing to take on this role and responsibility  Knows what you want for future medical care  Willing to follow your wishes even if they don't agree with them  Able to make difficult medical decisions under stressful circumstances    Advance Directives  These are legal documents you can create that will guide your healthcare team and decision maker(s) when needed. These documents can be stored in the electronic medical record.    Living Will - a legal document to guide your care if you have a terminal condition or a serious illness and are unable to communicate. States vary by statute in document names/types, but most forms may include one or more of the following:        -  Directions regarding life-prolonging treatments        -  Directions regarding artificially provided nutrition/hydration        -  Choosing a healthcare decision maker        -  Direction regarding organ/tissue  donation    Durable Power of  for Healthcare - this document names an -in-fact to make medical decisions for you, but it may also allow this person to make personal and financial decisions for you. Please seek the advice of an  if you need this type of document.    **Advance Directives are not required and no one may discriminate against you if you do not sign one.    Medical Orders  Many states allow specific forms/orders signed by your physician to record your wishes for medical treatment in your current state of health. This form, signed in personal communication with your physician, addresses resuscitation and other medical interventions that you may or may not want.      For more information or to schedule a time with a [facility] Advance Care Planning Facilitator contact: [facility] or call [phone] and someone will contact you directly.For more information:    Quit Now [place]  1-800-QUIT-NOW  https://[place].[employer].org/en-[place]/  Steps to Quit Smoking  Smoking tobacco can be harmful to your health and can affect almost every organ in your body. Smoking puts you, and those around you, at risk for developing many serious chronic diseases. Quitting smoking is difficult, but it is one of the best things that you can do for your health. It is never too late to quit.  What are the benefits of quitting smoking?  When you quit smoking, you lower your risk of developing serious diseases and conditions, such as:  Lung cancer or lung disease, such as COPD.  Heart disease.  Stroke.  Heart attack.  Infertility.  Osteoporosis and bone fractures.  Additionally, symptoms such as coughing, wheezing, and shortness of breath may get better when you quit. You may also find that you get sick less often because your body is stronger at fighting off colds and infections. If you are pregnant, quitting smoking can help to reduce your chances of having a baby of low birth weight.  How do I  get ready to quit?  When you decide to quit smoking, create a plan to make sure that you are successful. Before you quit:  Pick a date to quit. Set a date within the next two weeks to give you time to prepare.  Write down the reasons why you are quitting. Keep this list in places where you will see it often, such as on your bathroom mirror or in your car or wallet.  Identify the people, places, things, and activities that make you want to smoke (triggers) and avoid them. Make sure to take these actions:  Throw away all cigarettes at home, at work, and in your car.  Throw away smoking accessories, such as ashtrays and lighters.  Clean your car and make sure to empty the ashtray.  Clean your home, including curtains and carpets.  Tell your family, friends, and coworkers that you are quitting. Support from your loved ones can make quitting easier.  Talk with your health care provider about your options for quitting smoking.  Find out what treatment options are covered by your health insurance.  What strategies can I use to quit smoking?  Talk with your healthcare provider about different strategies to quit smoking. Some strategies include:  Quitting smoking altogether instead of gradually lessening how much you smoke over a period of time. Research shows that quitting “cold turkey” is more successful than gradually quitting.  Attending in-person counseling to help you build problem-solving skills. You are more likely to have success in quitting if you attend several counseling sessions. Even short sessions of 10 minutes can be effective.  Finding resources and support systems that can help you to quit smoking and remain smoke-free after you quit. These resources are most helpful when you use them often. They can include:  Online chats with a counselor.  Telephone quitlines.  Printed self-help materials.  Support groups or group counseling.  Text messaging programs.  Mobile phone applications.  Taking medicines to help  you quit smoking. (If you are pregnant or breastfeeding, talk with your health care provider first.) Some medicines contain nicotine and some do not. Both types of medicines help with cravings, but the medicines that include nicotine help to relieve withdrawal symptoms. Your health care provider may recommend:  Nicotine patches, gum, or lozenges.  Nicotine inhalers or sprays.  Non-nicotine medicine that is taken by mouth.  Talk with your health care provider about combining strategies, such as taking medicines while you are also receiving in-person counseling. Using these two strategies together makes you more likely to succeed in quitting than if you used either strategy on its own.  If you are pregnant or breastfeeding, talk with your health care provider about finding counseling or other support strategies to quit smoking. Do not take medicine to help you quit smoking unless told to do so by your health care provider.  What things can I do to make it easier to quit?  Quitting smoking might feel overwhelming at first, but there is a lot that you can do to make it easier. Take these important actions:  Reach out to your family and friends and ask that they support and encourage you during this time. Call telephone quitlines, reach out to support groups, or work with a counselor for support.  Ask people who smoke to avoid smoking around you.  Avoid places that trigger you to smoke, such as bars, parties, or smoke-break areas at work.  Spend time around people who do not smoke.  Lessen stress in your life, because stress can be a smoking trigger for some people. To lessen stress, try:  Exercising regularly.  Deep-breathing exercises.  Yoga.  Meditating.  Performing a body scan. This involves closing your eyes, scanning your body from head to toe, and noticing which parts of your body are particularly tense. Purposefully relax the muscles in those areas.  Download or purchase mobile phone or tablet apps (applications)  that can help you stick to your quit plan by providing reminders, tips, and encouragement. There are many free apps, such as QuitGuide from the CDC (Centers for Disease Control and Prevention). You can find other support for quitting smoking (smoking cessation) through smokefree.gov and other websites.  How will I feel when I quit smoking?  Within the first 24 hours of quitting smoking, you may start to feel some withdrawal symptoms. These symptoms are usually most noticeable 2-3 days after quitting, but they usually do not last beyond 2-3 weeks. Changes or symptoms that you might experience include:  Mood swings.  Restlessness, anxiety, or irritation.  Difficulty concentrating.  Dizziness.  Strong cravings for sugary foods in addition to nicotine.  Mild weight gain.  Constipation.  Nausea.  Coughing or a sore throat.  Changes in how your medicines work in your body.  A depressed mood.  Difficulty sleeping (insomnia).  After the first 2-3 weeks of quitting, you may start to notice more positive results, such as:  Improved sense of smell and taste.  Decreased coughing and sore throat.  Slower heart rate.  Lower blood pressure.  Clearer skin.  The ability to breathe more easily.  Fewer sick days.  Quitting smoking is very challenging for most people. Do not get discouraged if you are not successful the first time. Some people need to make many attempts to quit before they achieve long-term success. Do your best to stick to your quit plan, and talk with your health care provider if you have any questions or concerns.  This information is not intended to replace advice given to you by your health care provider. Make sure you discuss any questions you have with your health care provider.  Document Released: 12/12/2002 Document Revised: 08/15/2017 Document Reviewed: 05/03/2016  [employer] Interactive Patient Education © 2017 [employer]

## 2025-06-19 NOTE — TELEPHONE ENCOUNTER
----- Message from Harley Curtis sent at 6/19/2025 10:21 AM CDT -----  X-ray looks good he can come out of the brace on July 7  ----- Message -----  From: [employer]  Sent: 6/19/2025  10:15 AM CDT  To: Harley Curtis, ALYSSA

## 2025-06-19 NOTE — PROGRESS NOTES
"  Chief complaint:   Chief Complaint   Patient presents with    Neck Pain     Pt is here for 3wk p/o f/u. Pt states since surgery his symptoms have improved. Pt has brace on today.         Subjective     HPI: This is a 70 y.o. male patient who went to the operating room on 5/26/2025 for a Cervical Discectomy Posterior Fusion With O-arm C1-3. The patient is here in follow up today for postoperative visit.  He says overall he does feel he is doing well.  His pain is under better control.  Also complaining of neck pain but denies any radicular arm pain.  He remains in the cervical collar.        Review of Systems   Musculoskeletal:  Positive for neck pain.         Objective      Vital Signs  Ht 195.6 cm (77\")   Wt 96.2 kg (212 lb)   BMI 25.14 kg/m²     Physical Exam  Constitutional:       General: He is awake.      Appearance: He is well-developed.   HENT:      Head: Normocephalic.   Eyes:      Extraocular Movements: Extraocular movements intact.      Pupils: Pupils are equal, round, and reactive to light.   Pulmonary:      Effort: Pulmonary effort is normal.   Musculoskeletal:         General: Normal range of motion.      Cervical back: Normal range of motion.   Skin:     General: Skin is warm.   Neurological:      Mental Status: He is alert and oriented to person, place, and time.      GCS: GCS eye subscore is 4. GCS verbal subscore is 5. GCS motor subscore is 6.      Cranial Nerves: No cranial nerve deficit.      Sensory: No sensory deficit.      Motor: Motor strength is normal.     Gait: Gait normal.      Deep Tendon Reflexes: Reflexes are normal and symmetric.   Psychiatric:         Speech: Speech normal.         Behavior: Behavior normal.         Thought Content: Thought content normal.       Incisions clean dry and intact    Neurological Exam  Mental Status  Awake and alert. Oriented to person, place and time. Oriented to person, place, and time. Speech is normal. Language is fluent with no aphasia. Attention " and concentration are normal.    Cranial Nerves  CN I: Sense of smell is normal.  CN II: Right normal visual field. Left normal visual field.  CN III, IV, VI: Extraocular movements intact bilaterally. Pupils equal round and reactive to light bilaterally.  CN V: Facial sensation is normal.  CN VII:  Right: There is no facial weakness.  Left: There is no facial weakness.  CN XI: Shoulder shrug strength is normal.  CN XII: Tongue midline without atrophy or fasciculations.    Motor  Normal muscle bulk throughout. Normal muscle tone. Strength is 5/5 throughout all four extremities.    Sensory  Sensation is intact to light touch, pinprick, vibration and proprioception in all four extremities.    Reflexes  Deep tendon reflexes are 2+ and symmetric in all four extremities.    Gait  Normal casual, toe, heel and tandem gait. Normal gait.      Imaging review: No new imaging          Assessment/Plan: Patient is doing well with surgery.  Symptoms are under control.  At this time we will plan to see him back in August with Dr. Barrett.  He will go for x-rays of the cervical spine today to see when he come out of the collar.  We did go over activity restrictions after he comes out of the brace.  He is told to call us if any further problems or concerns    Patient is a nonsmoker  The patient's Body mass index is 25.14 kg/m².. BMI is above normal parameters. Recommendations include: educational material and nutrition counseling    Diagnoses and all orders for this visit:    1. Closed odontoid fracture with routine healing, subsequent encounter (Primary)  -     XR Spine Cervical Complete 4 or 5 View; Future    2. Overweight with body mass index (BMI) of 25 to 25.9 in adult    3. Tobacco use        I discussed the patients findings and my recommendations with patient  Harley Curtis, ALYSSA  06/19/25  09:29 CDT  Answers submitted by the patient for this visit:  Post Operative Visit (Submitted on 6/19/2025)  Chief Complaint:  Follow-up  Pain Control: partially controlled  Fever: no fever  Diet: poor intake, lack of appetite  Activity: impaired due to pain  Operative Site Issues: No

## 2025-07-07 DIAGNOSIS — R91.1 PULMONARY NODULE: Primary | ICD-10-CM

## 2025-07-07 RX ORDER — FAMOTIDINE 40 MG/1
40 TABLET, FILM COATED ORAL NIGHTLY
Qty: 90 TABLET | Refills: 11 | Status: SHIPPED | OUTPATIENT
Start: 2025-07-07

## 2025-07-07 NOTE — TELEPHONE ENCOUNTER
Rx Refill Note  Requested Prescriptions     Pending Prescriptions Disp Refills    famotidine (PEPCID) 40 MG tablet [Pharmacy Med Name: FAMOTIDINE 40 MG TABS 40 Tablet] 90 tablet 11     Sig: TAKE 1 TABLET BY MOUTH EVERY NIGHT.      Last office visit with prescribing clinician: 4/1/2025   Last telemedicine visit with prescribing clinician: Visit date not found   Next office visit with prescribing clinician: 10/14/2025                         Would you like a call back once the refill request has been completed: [] Yes [] No    If the office needs to give you a call back, can they leave a voicemail: [] Yes [] No    Ximena Caraballo MA  07/07/25, 09:46 CDT

## 2025-07-14 ENCOUNTER — TELEPHONE (OUTPATIENT)
Dept: FAMILY MEDICINE CLINIC | Facility: CLINIC | Age: 70
End: 2025-07-14
Payer: MEDICARE

## 2025-07-14 NOTE — TELEPHONE ENCOUNTER
Tried to call pt to let him know about the CT he will need to complete a week or two before his next appt went to  and that was full.

## 2025-07-14 NOTE — TELEPHONE ENCOUNTER
----- Message from Maurice Caraballo sent at 7/7/2025 11:14 AM CDT -----  Please advise patient that CTA chest done when he was at the hospital last showed a concerning lung nodule that needs to be watched.  It is recommended that CT of the chest be done in 3 months.  This would coincide closely with our next visit.  I will go ahead and order CT for patient to do ideally 1 week before appointment.  ----- Message -----  From: Mily Leon  Sent: 7/2/2025   9:19 AM CDT  To: Maurice Caraballo MD    Please review the following recommended f/u imaging.   Thank you,   Mily Leon, Patient Navigator [phone]

## 2025-07-14 NOTE — TELEPHONE ENCOUNTER
Patient returned call, verified name and .  Advised of CTA results and to repeat scan to monitor 1 week before next office visit.  Patient voiced understanding.

## 2025-07-22 ENCOUNTER — TELEPHONE (OUTPATIENT)
Dept: NEUROSURGERY | Facility: CLINIC | Age: 70
End: 2025-07-22
Payer: MEDICARE

## 2025-07-22 NOTE — TELEPHONE ENCOUNTER
Patient called and left a VM today stating he was seen by Harley for his postop appt but needs to be seen again because he is having a lot of pain & wants to know what is going on.  In review of the chart it says he was still having some neck pain but the arm symptoms had improved.    I am going to send this over to Harley to see if he wants to order xrays, see the patient, etc.  I will notify the patient once I hear back from Harley.    DEMETRIS SKY [facility]  CLINICAL COORDINATOR  DR DAX CASTELAN  [facility]    IT IS OKAY FOR THE HUB TO DELIVER THIS INFORMATION TO THE PATIENT IF THEY RECEIVE THIS CALL BACK

## 2025-07-23 ENCOUNTER — HOSPITAL ENCOUNTER (OUTPATIENT)
Dept: GENERAL RADIOLOGY | Facility: HOSPITAL | Age: 70
Discharge: HOME | End: 2025-07-23
Admitting: NURSE PRACTITIONER
Payer: MEDICARE

## 2025-07-23 DIAGNOSIS — S12.100D CLOSED ODONTOID FRACTURE WITH ROUTINE HEALING, SUBSEQUENT ENCOUNTER: ICD-10-CM

## 2025-07-23 DIAGNOSIS — S12.100D CLOSED ODONTOID FRACTURE WITH ROUTINE HEALING, SUBSEQUENT ENCOUNTER: Primary | ICD-10-CM

## 2025-07-23 PROCEDURE — 72050 X-RAY EXAM NECK SPINE 4/5VWS: CPT

## 2025-07-23 NOTE — TELEPHONE ENCOUNTER
Harley ordered xrays for patient to have done.  I called him and he is going to come in today & get them done.    DEMETRIS SKY [facility]  CLINICAL COORDINATOR  DR DAX CASTELAN  [facility]    IT IS OKAY FOR THE HUB TO DELIVER THIS INFORMATION TO THE PATIENT IF THEY RECEIVE THIS CALL BACK

## 2025-07-24 ENCOUNTER — TELEPHONE (OUTPATIENT)
Dept: NEUROSURGERY | Facility: CLINIC | Age: 70
End: 2025-07-24
Payer: MEDICARE

## 2025-07-24 NOTE — TELEPHONE ENCOUNTER
Placed a call to inform pt his x-rays look good and Harley would like to know if pt is taking anything for the pain? N/A LVM

## 2025-07-25 NOTE — TELEPHONE ENCOUNTER
Placed a call to inform pt imaging looks good but Harley would like to know if pt is taking anything for the pain. Per pt  he is not taking anything for the pain only Advil doesn't seem to help and the pain is keeping him up at night he can't get conformable it seems at night time it gets real stiff and the pain comes and it hurts a lot. Informed pt I would send Harley a message.

## 2025-07-28 RX ORDER — CYCLOBENZAPRINE HCL 10 MG
10 TABLET ORAL 3 TIMES DAILY PRN
Qty: 90 TABLET | Refills: 2 | Status: SHIPPED | OUTPATIENT
Start: 2025-07-28

## 2025-07-28 RX ORDER — DICLOFENAC SODIUM 75 MG/1
75 TABLET, DELAYED RELEASE ORAL 2 TIMES DAILY
Qty: 60 TABLET | Refills: 2 | Status: SHIPPED | OUTPATIENT
Start: 2025-07-28

## 2025-07-28 NOTE — TELEPHONE ENCOUNTER
Placed a call to inform pt Harley sent in diclofenac and a muscle relaxer to try and see if this will help with his pain. If he is not better in a couple days have him call us back and let us know. He cannot take any Aleve ibuprofen or Advil. Tylenol is okay to take. Ended call with pt understanding and acknowledgment.

## 2025-07-29 ENCOUNTER — HOSPITAL ENCOUNTER (OUTPATIENT)
Dept: CT IMAGING | Facility: HOSPITAL | Age: 70
Discharge: HOME | End: 2025-07-29
Admitting: STUDENT IN AN ORGANIZED HEALTH CARE EDUCATION/TRAINING PROGRAM
Payer: MEDICARE

## 2025-07-29 DIAGNOSIS — R91.1 PULMONARY NODULE: ICD-10-CM

## 2025-07-29 PROCEDURE — 71260 CT THORAX DX C+: CPT

## 2025-07-29 PROCEDURE — 25510000001 IOPAMIDOL 61 % SOLUTION: Performed by: STUDENT IN AN ORGANIZED HEALTH CARE EDUCATION/TRAINING PROGRAM

## 2025-07-29 RX ORDER — IOPAMIDOL 612 MG/ML
100 INJECTION, SOLUTION INTRAVASCULAR
Status: COMPLETED | OUTPATIENT
Start: 2025-07-29 | End: 2025-07-29

## 2025-07-29 RX ADMIN — IOPAMIDOL 100 ML: 612 INJECTION, SOLUTION INTRAVENOUS at 08:30

## 2025-07-30 ENCOUNTER — TELEPHONE (OUTPATIENT)
Dept: FAMILY MEDICINE CLINIC | Facility: CLINIC | Age: 70
End: 2025-07-30

## 2025-08-04 DIAGNOSIS — F51.01 PRIMARY INSOMNIA: ICD-10-CM

## 2025-08-05 RX ORDER — ESZOPICLONE 3 MG/1
3 TABLET, FILM COATED ORAL NIGHTLY
Qty: 30 TABLET | Refills: 2 | Status: SHIPPED | OUTPATIENT
Start: 2025-08-05

## 2025-08-14 ENCOUNTER — OFFICE VISIT (OUTPATIENT)
Dept: NEUROSURGERY | Facility: CLINIC | Age: 70
End: 2025-08-14
Payer: MEDICARE

## 2025-08-14 VITALS — BODY MASS INDEX: 25.62 KG/M2 | HEIGHT: 77 IN | WEIGHT: 217 LBS

## 2025-08-14 DIAGNOSIS — S12.100D CLOSED ODONTOID FRACTURE WITH ROUTINE HEALING, SUBSEQUENT ENCOUNTER: Primary | ICD-10-CM

## 2025-08-14 DIAGNOSIS — E66.3 OVERWEIGHT WITH BODY MASS INDEX (BMI) OF 25 TO 25.9 IN ADULT: ICD-10-CM

## 2025-08-14 DIAGNOSIS — F17.200 CURRENT EVERY DAY SMOKER: ICD-10-CM

## 2025-08-14 PROCEDURE — 99024 POSTOP FOLLOW-UP VISIT: CPT | Performed by: NEUROLOGICAL SURGERY

## 2025-08-14 RX ORDER — AMLODIPINE BESYLATE 5 MG/1
1 TABLET ORAL DAILY
COMMUNITY
Start: 2025-07-04

## (undated) DEVICE — PK TURNOVER RM ADV

## (undated) DEVICE — PROXIMATE RH ROTATING HEAD SKIN STAPLERS (35 WIDE) CONTAINS 35 STAINLESS STEEL STAPLES: Brand: PROXIMATE

## (undated) DEVICE — CONN FLX BREATHE CIRCT

## (undated) DEVICE — CATH SZ ACCUVU SEG/20CM OMNI 5F 100CM

## (undated) DEVICE — GLV SURG DERMASSURE GRN LF PF 8.0

## (undated) DEVICE — PAD, DEFIB, ADULT, RADIOTRANS, PHYSIO: Brand: MEDLINE

## (undated) DEVICE — SOL IRR NACL 0.9PCT BO 1000ML

## (undated) DEVICE — PINNACLE INTRODUCER SHEATH: Brand: PINNACLE

## (undated) DEVICE — DRSNG SURESITE WNDW 4X4.5

## (undated) DEVICE — STEERABLE SHEATH CLEAR: Brand: FARADRIVE™

## (undated) DEVICE — SYS CLS VASC/VENI VASCADE MVP 6TO12F

## (undated) DEVICE — BG BANDED WRUBBER BAND AND TP 36X54IN

## (undated) DEVICE — Device: Brand: PENTARAY NAV

## (undated) DEVICE — TRAP FLD MINIVAC MEGADYNE 100ML

## (undated) DEVICE — CANN NASL ETCO2 LO/FLO A/

## (undated) DEVICE — SOLIDIFIER LIQUI LOC PLUS 2000CC

## (undated) DEVICE — 3M™ STERI-STRIP™ REINFORCED ADHESIVE SKIN CLOSURES, R1547, 1/2 IN X 4 IN (12 MM X 100 MM), 6 STRIPS/ENVELOPE: Brand: 3M™ STERI-STRIP™

## (undated) DEVICE — KT NDL GUIDE STRL 18GA

## (undated) DEVICE — PK CATH CARD 30 CA/4

## (undated) DEVICE — PINNACLE R/O II INTRODUCER SHEATH WITH RADIOPAQUE MARKER: Brand: PINNACLE

## (undated) DEVICE — MULTI AXIAL SCREW 3603520 3.5 X 20MM
Type: IMPLANTABLE DEVICE | Site: SPINE CERVICAL | Status: NON-FUNCTIONAL
Brand: INFINITY™ OCCIPITOCERVICAL UPPER THORACIC SYSTEM
Removed: 2025-05-26

## (undated) DEVICE — SYR CONTRL LUERLOK 10CC

## (undated) DEVICE — CLTH CLENS READYCLEANSE PERI CARE PK/5

## (undated) DEVICE — SPNG GZ WOVN 4X4IN 12PLY 10/BX STRL

## (undated) DEVICE — 1000ML,PRESSURE INFUSER W/STOPCOCK: Brand: MEDLINE

## (undated) DEVICE — GLV SURG BIOGEL LTX PF 6 1/2

## (undated) DEVICE — Device: Brand: MEDEX

## (undated) DEVICE — Device: Brand: PROTRACK PIGTAIL WIRE

## (undated) DEVICE — SUT PROLN 5/0 C1 DA 24IN 8725H

## (undated) DEVICE — TOOL MR8-15TA23L MR8 15CM TAPER L 2.3MM: Brand: MIDAS REX MR8

## (undated) DEVICE — CATH FLSH OMNI SFT 5F 90CM

## (undated) DEVICE — PULSED FIELD ABLATION CATHETER: Brand: FARAWAVE™

## (undated) DEVICE — SYS CLS VASC/VENI VASCADE/MVP 10TO12F XL

## (undated) DEVICE — DEV CLS VASC MYNXCONTROL 6FTO7F

## (undated) DEVICE — GLV SURG BIOGEL LTX PF 7 1/2

## (undated) DEVICE — MODEL BT2000 P/N 700287-012KIT CONTENTS: MANIFOLD WITH SALINE AND CONTRAST PORTS, SALINE TUBING WITH SPIKE AND HAND SYRINGE, TRANSDUCER: Brand: BT2000 AUTOMATED MANIFOLD KIT

## (undated) DEVICE — ANTIBACTERIAL VIOLET BRAIDED (POLYGLACTIN 910), SYNTHETIC ABSORBABLE SUTURE: Brand: COATED VICRYL

## (undated) DEVICE — 1 X VERSACROSS CONNECT TRANSSEPTAL DILATOR;  1 X VERSACROSS RF WIRE (INCLUDING 1 X CONNECTOR CABLE (SINGLE USE)): Brand: VERSACROSS CONNECT ACCESS SOLUTION FOR FARADRIVE

## (undated) DEVICE — SYS COL WAST NAMIC IV SGL/LN FML/FIT W/VNT/SPK/HD 72IN

## (undated) DEVICE — VAGINAL PREP TRAY: Brand: MEDLINE INDUSTRIES, INC.

## (undated) DEVICE — Device: Brand: NRG TRANSSEPTAL NEEDLE

## (undated) DEVICE — THE STERILE THE STERIS STERILE CAMERA HANDLE COVERS ARE DESIGNED FOR HARMONYAIR 4K CAMERA MODULE, AND PROVIDE STERILE CONTROL THAT ALLOW FOR INCREASING AND DECREASING ILLUMINATION THROUGH SEVEN INTENSITY LEVELS.

## (undated) DEVICE — ST MIC/INTRO ACC SHRP/NDL TUNG/TP NITNL 5F 45CM 7CM

## (undated) DEVICE — SLV CBL IVL 5X96IN

## (undated) DEVICE — RADIFOCUS GLIDEWIRE ADVANTAGE GUIDEWIRE: Brand: GLIDEWIRE ADVANTAGE

## (undated) DEVICE — SYR LUERLOK 50ML

## (undated) DEVICE — COLR CERV VISTA TX 1SZ ADJ

## (undated) DEVICE — MODEL AT P65, P/N 701554-001KIT CONTENTS: HAND CONTROLLER, 3-WAY HIGH-PRESSURE STOPCOCK WITH ROTATING END AND PREMIUM HIGH-PRESSURE TUBING: Brand: ANGIOTOUCH® KIT

## (undated) DEVICE — NDL HYPO PRECISIONGLIDE REG 22G 1 1/2

## (undated) DEVICE — ANTIBACTERIAL UNDYED BRAIDED (POLYGLACTIN 910), SYNTHETIC ABSORBABLE SUTURE: Brand: COATED VICRYL

## (undated) DEVICE — ELECTRD BLD EZ CLN MOD XLNG 2.75IN

## (undated) DEVICE — A2000 MULTI-USE SYRINGE KIT, P/N 701277-003KIT CONTENTS: 100ML CONTRAST RESERVOIR AND TUBING WITH CONTRAST SPIKE AND CLAMP: Brand: A2000 MULTI-USE SYRINGE KIT

## (undated) DEVICE — CATHETER CONNECTION CABLE: Brand: FARASTAR™

## (undated) DEVICE — LIMB HOLDER, WRIST/ANKLE: Brand: DEROYAL

## (undated) DEVICE — SUT VIC 0 MO4 CR8 18IN VCP701D

## (undated) DEVICE — TOTAL TRAY, 16FR 10ML SIL FOLEY, URN: Brand: MEDLINE

## (undated) DEVICE — PERCLOSE PROGLIDE™ SUTURE-MEDIATED CLOSURE SYSTEM: Brand: PERCLOSE PROGLIDE™

## (undated) DEVICE — SUREFIT, DUAL DISPERSIVE ELECTRODE, CONTACT QUALITY MONITOR: Brand: SUREFIT

## (undated) DEVICE — Device: Brand: SOUNDSTAR

## (undated) DEVICE — PK SPINE POST 30

## (undated) DEVICE — CATH IV ANGIO FEP 12G 3IN LTBLU 10PK

## (undated) DEVICE — Device: Brand: WEBSTER CS

## (undated) DEVICE — SI AVANTI+ 10F STD W/GW: Brand: AVANTI

## (undated) DEVICE — SI AVANTI+ 8F STD W/GW  NO OBT: Brand: AVANTI

## (undated) DEVICE — CATH F6 ST+ MP A1 100CM: Brand: SUPER TORQUE

## (undated) DEVICE — SNAP KOVER: Brand: UNBRANDED

## (undated) DEVICE — PAD ENDOVASCULAR: Brand: MEDLINE INDUSTRIES, INC.

## (undated) DEVICE — THE STERILE LIGHT HANDLE COVER IS USED WITH STERIS SURGICAL LIGHTING AND VISUALIZATION SYSTEMS.

## (undated) DEVICE — PRESSURE MONITORING SET: Brand: TRUWAVE

## (undated) DEVICE — CABL BIPOL W/ALLGTR CLIP/SM 12FT

## (undated) DEVICE — CVR PROB GEN PURP W ISOSILK 6X48

## (undated) DEVICE — SPHR MARKR STEALTH STATION

## (undated) DEVICE — SHEATH SENTRANT 16F 28CM

## (undated) DEVICE — Device

## (undated) DEVICE — SUT MNCRYL 4/0 PS2 27IN UD MCP426H

## (undated) DEVICE — ACCESS SHEATH WITH DILATOR: Brand: WATCHMAN FXD CURVE™ ACCESS SYSTEM

## (undated) DEVICE — DRSNG TELFA PAD NONADH STR 1S 3X8IN

## (undated) DEVICE — 1LYRTR 16FR10ML100%SIL UMS SNP: Brand: MEDLINE INDUSTRIES, INC.

## (undated) DEVICE — SOL NS 500ML

## (undated) DEVICE — APPL CHLORAPREP HI/LITE 26ML ORNG

## (undated) DEVICE — STAINLESS STEEL SKULL PIN: Brand: SKULL PINS

## (undated) DEVICE — SHEET,DRAPE,53X77,STERILE: Brand: MEDLINE

## (undated) DEVICE — PAD,NON-ADHERENT,3X8,STERILE,LF,1/PK: Brand: MEDLINE

## (undated) DEVICE — SHEATH SENTRANT 12F 28CM

## (undated) DEVICE — DRP SPECIAL PROC 4PC W/FC POUCH

## (undated) DEVICE — SOLIDIFIER LIQ LIQUILOC/PLUS W/TREAT 2000CC

## (undated) DEVICE — GAUZE,SPONGE,4"X4",16PLY,XRAY,STRL,LF: Brand: MEDLINE

## (undated) DEVICE — DRAPE,ANGIO,BRACH,STERILE,38X44: Brand: MEDLINE

## (undated) DEVICE — PAD MAJOR VASCULAR: Brand: MEDLINE INDUSTRIES, INC.

## (undated) DEVICE — CANN VESL ACRN TP 4MM

## (undated) DEVICE — GLV SURG BIOGEL LTX PF 8

## (undated) DEVICE — UTILITY MARKER W/MED LABELS: Brand: MEDLINE

## (undated) DEVICE — CATH F6INF PIG 145 110CM 6SH: Brand: INFINITI

## (undated) DEVICE — TBG PRESS/MONITR FIX M/F LL A/ 48IN STRL

## (undated) DEVICE — DRAPE,FEM ANGIO,2 WIND,STERILE: Brand: MEDLINE

## (undated) DEVICE — BALN EVERCROSS OTW .035 6F 10X40MM 135CM

## (undated) DEVICE — STPCK 3/WY HP M/RA W/OFF/HNDL 1050PSI STRL

## (undated) DEVICE — BALN STENTGR RELIANT 8F 100CM

## (undated) DEVICE — Device: Brand: REFERENCE PATCH CARTO 3

## (undated) DEVICE — SOL IRR NACL 0.9PCT BT 1000ML

## (undated) DEVICE — TORFLEX TRANSSEPTAL SHEATH; TRANSSEPTAL DILATOR; J-TIP GUIDEWIRE: Brand: TORFLEX TRANSSEPTAL GUIDING SHEATH